# Patient Record
Sex: FEMALE | Race: BLACK OR AFRICAN AMERICAN | NOT HISPANIC OR LATINO | Employment: FULL TIME | URBAN - METROPOLITAN AREA
[De-identification: names, ages, dates, MRNs, and addresses within clinical notes are randomized per-mention and may not be internally consistent; named-entity substitution may affect disease eponyms.]

---

## 2017-02-14 ENCOUNTER — NON-PROVIDER VISIT (OUTPATIENT)
Dept: URGENT CARE | Facility: CLINIC | Age: 37
End: 2017-02-14

## 2017-02-14 DIAGNOSIS — Z11.1 PPD SCREENING TEST: ICD-10-CM

## 2017-02-14 PROCEDURE — 86580 TB INTRADERMAL TEST: CPT | Performed by: NURSE PRACTITIONER

## 2017-02-16 ENCOUNTER — NON-PROVIDER VISIT (OUTPATIENT)
Dept: URGENT CARE | Facility: CLINIC | Age: 37
End: 2017-02-16

## 2017-02-16 LAB — TB WHEAL 3D P 5 TU DIAM: NORMAL MM

## 2017-02-16 NOTE — PROGRESS NOTES
Jennie Morales is a 36 y.o. female here for a non-provider visit for PPD reading -- Step 1 of 1.      Resulted in Epic under original non-provider visit? Yes   TB evaluation questionnaire scanned into chart and original given to pt?Yes      Routed to PCP? No

## 2017-03-20 ENCOUNTER — HOSPITAL ENCOUNTER (OUTPATIENT)
Dept: LAB | Facility: MEDICAL CENTER | Age: 37
End: 2017-03-20
Attending: INTERNAL MEDICINE
Payer: COMMERCIAL

## 2017-03-20 ENCOUNTER — OFFICE VISIT (OUTPATIENT)
Dept: MEDICAL GROUP | Facility: MEDICAL CENTER | Age: 37
End: 2017-03-20
Payer: COMMERCIAL

## 2017-03-20 VITALS
WEIGHT: 161.82 LBS | TEMPERATURE: 97.3 F | BODY MASS INDEX: 23.97 KG/M2 | SYSTOLIC BLOOD PRESSURE: 112 MMHG | HEIGHT: 69 IN | HEART RATE: 81 BPM | DIASTOLIC BLOOD PRESSURE: 62 MMHG | OXYGEN SATURATION: 100 %

## 2017-03-20 DIAGNOSIS — Z00.00 HEALTH CARE MAINTENANCE: ICD-10-CM

## 2017-03-20 DIAGNOSIS — R32 URINARY INCONTINENCE, UNSPECIFIED TYPE: ICD-10-CM

## 2017-03-20 DIAGNOSIS — N92.0 MENORRHAGIA WITH REGULAR CYCLE: ICD-10-CM

## 2017-03-20 DIAGNOSIS — Z76.89 ENCOUNTER TO ESTABLISH CARE: ICD-10-CM

## 2017-03-20 LAB
25(OH)D3 SERPL-MCNC: 38 NG/ML (ref 30–100)
ALBUMIN SERPL BCP-MCNC: 3.2 G/DL (ref 3.2–4.9)
ALBUMIN/GLOB SERPL: 0.7 G/DL
ALP SERPL-CCNC: 42 U/L (ref 30–99)
ALT SERPL-CCNC: 11 U/L (ref 2–50)
ANION GAP SERPL CALC-SCNC: 7 MMOL/L (ref 0–11.9)
ANISOCYTOSIS BLD QL SMEAR: ABNORMAL
APPEARANCE UR: CLEAR
AST SERPL-CCNC: 26 U/L (ref 12–45)
BACTERIA #/AREA URNS HPF: ABNORMAL /HPF
BASOPHILS # BLD AUTO: 0.3 % (ref 0–1.8)
BASOPHILS # BLD: 0.01 K/UL (ref 0–0.12)
BILIRUB SERPL-MCNC: 0.6 MG/DL (ref 0.1–1.5)
BILIRUB UR QL STRIP.AUTO: NEGATIVE
BUN SERPL-MCNC: 10 MG/DL (ref 8–22)
CALCIUM SERPL-MCNC: 8.8 MG/DL (ref 8.4–10.2)
CHLORIDE SERPL-SCNC: 107 MMOL/L (ref 96–112)
CHOLEST SERPL-MCNC: 232 MG/DL (ref 100–199)
CO2 SERPL-SCNC: 23 MMOL/L (ref 20–33)
COLOR UR: YELLOW
COMMENT 1642: NORMAL
CREAT SERPL-MCNC: 0.82 MG/DL (ref 0.5–1.4)
CULTURE IF INDICATED INDCX: NO UA CULTURE
EOSINOPHIL # BLD AUTO: 0.02 K/UL (ref 0–0.51)
EOSINOPHIL NFR BLD: 0.6 % (ref 0–6.9)
EPI CELLS #/AREA URNS HPF: ABNORMAL /HPF
ERYTHROCYTE [DISTWIDTH] IN BLOOD BY AUTOMATED COUNT: 52 FL (ref 35.9–50)
GFR SERPL CREATININE-BSD FRML MDRD: >60 ML/MIN/1.73 M 2
GLOBULIN SER CALC-MCNC: 4.3 G/DL (ref 1.9–3.5)
GLUCOSE SERPL-MCNC: 93 MG/DL (ref 65–99)
GLUCOSE UR STRIP.AUTO-MCNC: NEGATIVE MG/DL
HCT VFR BLD AUTO: 24.3 % (ref 37–47)
HDLC SERPL-MCNC: 78 MG/DL
HGB BLD-MCNC: 6.7 G/DL (ref 12–16)
HYPOCHROMIA BLD QL SMEAR: ABNORMAL
IMM GRANULOCYTES # BLD AUTO: 0.01 K/UL (ref 0–0.11)
IMM GRANULOCYTES NFR BLD AUTO: 0.3 % (ref 0–0.9)
KETONES UR STRIP.AUTO-MCNC: NEGATIVE MG/DL
LDLC SERPL CALC-MCNC: 141 MG/DL
LEUKOCYTE ESTERASE UR QL STRIP.AUTO: NEGATIVE
LYMPHOCYTES # BLD AUTO: 1.4 K/UL (ref 1–4.8)
LYMPHOCYTES NFR BLD: 40.6 % (ref 22–41)
MACROCYTES BLD QL SMEAR: ABNORMAL
MCH RBC QN AUTO: 18.2 PG (ref 27–33)
MCHC RBC AUTO-ENTMCNC: 27.6 G/DL (ref 33.6–35)
MCV RBC AUTO: 66 FL (ref 81.4–97.8)
MICRO URNS: ABNORMAL
MICROCYTES BLD QL SMEAR: ABNORMAL
MONOCYTES # BLD AUTO: 0.18 K/UL (ref 0–0.85)
MONOCYTES NFR BLD AUTO: 5.2 % (ref 0–13.4)
MUCOUS THREADS #/AREA URNS HPF: ABNORMAL /HPF
NEUTROPHILS # BLD AUTO: 1.83 K/UL (ref 2–7.15)
NEUTROPHILS NFR BLD: 53 % (ref 44–72)
NITRITE UR QL STRIP.AUTO: NEGATIVE
NRBC # BLD AUTO: 0 K/UL
NRBC BLD AUTO-RTO: 0 /100 WBC
PH UR STRIP.AUTO: 5.5 [PH]
PLATELET # BLD AUTO: 373 K/UL (ref 164–446)
PLATELET BLD QL SMEAR: NORMAL
PMV BLD AUTO: 8.6 FL (ref 9–12.9)
POLYCHROMASIA BLD QL SMEAR: NORMAL
POTASSIUM SERPL-SCNC: 3.7 MMOL/L (ref 3.6–5.5)
PROT SERPL-MCNC: 7.5 G/DL (ref 6–8.2)
PROT UR QL STRIP: NEGATIVE MG/DL
RBC # BLD AUTO: 3.68 M/UL (ref 4.2–5.4)
RBC # URNS HPF: ABNORMAL /HPF
RBC BLD AUTO: PRESENT
RBC UR QL AUTO: ABNORMAL
SODIUM SERPL-SCNC: 137 MMOL/L (ref 135–145)
SP GR UR STRIP.AUTO: 1.02
TRIGL SERPL-MCNC: 66 MG/DL (ref 0–149)
TSH SERPL DL<=0.005 MIU/L-ACNC: 1.05 UIU/ML (ref 0.35–5.5)
WBC # BLD AUTO: 3.5 K/UL (ref 4.8–10.8)

## 2017-03-20 PROCEDURE — 81001 URINALYSIS AUTO W/SCOPE: CPT

## 2017-03-20 PROCEDURE — 36415 COLL VENOUS BLD VENIPUNCTURE: CPT

## 2017-03-20 PROCEDURE — 99204 OFFICE O/P NEW MOD 45 MIN: CPT | Performed by: INTERNAL MEDICINE

## 2017-03-20 PROCEDURE — 84443 ASSAY THYROID STIM HORMONE: CPT

## 2017-03-20 PROCEDURE — 82306 VITAMIN D 25 HYDROXY: CPT

## 2017-03-20 PROCEDURE — 80053 COMPREHEN METABOLIC PANEL: CPT

## 2017-03-20 PROCEDURE — 85025 COMPLETE CBC W/AUTO DIFF WBC: CPT

## 2017-03-20 PROCEDURE — 80061 LIPID PANEL: CPT

## 2017-03-20 ASSESSMENT — PATIENT HEALTH QUESTIONNAIRE - PHQ9: CLINICAL INTERPRETATION OF PHQ2 SCORE: 0

## 2017-03-20 NOTE — PROGRESS NOTES
CHIEF COMPLAINT  Chief Complaint   Patient presents with   • Establish Care   Urinary incontinence    HPI  Patient is a 36 y.o. female patient who presents today for the following     Urinary incontinence  Onset: 1 year ago  C/o intermittent urinary leakage, present occasionally overnight.   It is not worse with stress or urge incontinence.   Course: up/down  She had fibroid surgery 4 yrs ago.   Denies: fever, chills, change in urine color/odor, flank/abdominal pain.   She does not know about Kegel exercise.     Irregular periods  C/o intermittent periods lasting up to 10 days.   Frequency: every month.   Denies hot flushes, sweating, vaginal dryness, mood swings.   St post fibroids surgery.     Reviewed PMH, PSH, FH, SH, ALL, HCM/IMM  Reviewed MEDS    Patient Active Problem List    Diagnosis Date Noted   • Urinary incontinence 03/20/2017     CURRENT MEDICATIONS  No current outpatient prescriptions on file.     No current facility-administered medications for this visit.     ALLERGIES  Allergies: Review of patient's allergies indicates not on file.  PAST MEDICAL HISTORY  Past Medical History   Diagnosis Date   • Urinary incontinence      SURGICAL HISTORY  She  has no past surgical history on file.  SOCIAL HISTORY  Social History   Substance Use Topics   • Smoking status: Never Smoker    • Smokeless tobacco: Never Used   • Alcohol Use: 0.0 oz/week     0 Standard drinks or equivalent per week      Comment: occ     Social History     Social History Narrative   • No narrative on file     FAMILY HISTORY  Family History   Problem Relation Age of Onset   • Cancer Sister      cervical cance   • Cancer Maternal Grandmother    • Diabetes Father    • Heart Disease Neg Hx    • Hypertension Neg Hx    • Hyperlipidemia Mother    • Hyperlipidemia Father    • Hyperlipidemia Sister    • Psychiatry Mother    • Psychiatry Sister    • Thyroid Neg Hx      No family status information on file.       ROS   Constitutional: Negative for  "fever, chills.  HENT: Negative for congestion, sore throat.  Eyes: Negative for blurred vision.   Respiratory: Negative for cough, shortness of breath.  Cardiovascular: Negative for chest pain, palpitations.   Gastrointestinal: Negative for heartburn, nausea, abdominal pain.   Genitourinary: Negative for dysuria.  Musculoskeletal: Negative for significant myalgias, back pain and joint pain.   Skin: Negative for rash and itching.   Neuro: Negative for dizziness, weakness and headaches.   Endo/Heme/Allergies: Does not bruise/bleed easily.   Psychiatric/Behavioral: Negative for depression, anxiety    PHYSICAL EXAM   Blood pressure 112/62, pulse 81, temperature 36.3 °C (97.3 °F), height 1.753 m (5' 9\"), weight 73.4 kg (161 lb 13.1 oz), SpO2 100 %. Body mass index is 23.89 kg/(m^2).  General:  NAD, well appearing  HEENT:   NC/AT, PERRLA, EOMI, TMs are clear. Oropharyngeal mucosa is pink,  without lesions;  no cervical / supraclavicular  lymphadenopathy, no thyromegaly.    Cardiovascular: RRR.   No m/r/g. No carotid bruits .      Lungs:   CTAB, no w/r/r, no respiratory distress.  Abdomen: Soft, NT/ND + BS; no suprapubic tenderness; no masses or hepatosplenomegaly.  Extremities:  2+ DP and radial pulses bilaterally.  No c/c/e.   Skin:  Warm, dry.  No erythema. No rash.   Neurologic: Alert & oriented x 3.  No focal deficits.  Psychiatric:  Affect normal, mood normal, judgment normal.    LABS     Pending.    IMAGING     None    ASSESMENT AND PLAN        1. Urinary incontinence, unspecified type  Pending labs.  - REFERRAL TO PHYSICAL THERAPY Reason for Therapy: Eval/Treat/Report  - REFERRAL TO UROLOGY    2. Irregular periods  Pending labs.   Will be followed.     3. Health care maintenance  - CBC WITH DIFFERENTIAL; Future  - COMP METABOLIC PANEL; Future  - LIPID PROFILE; Future  - TSH; Future  - VITAMIN D,25 HYDROXY; Future    Declined flu vaccine.     4. Encounter to establish care  Reviewed PMH, PSH, FH, SH, ALL, MEDS, " HCM/IMM.   Advised healthy habits, diet, exercise.  Release form for old records: signed    Counseling:   - Smoking:  Nonsmoker     Followup: Return in about 2 months (around 5/20/2017).    All questions are answered.    Please note that this dictation was created using voice recognition software, and that there might be errors of rai and possibly content.

## 2017-03-20 NOTE — MR AVS SNAPSHOT
"        Jennie Morales   3/20/2017 7:20 AM   Office Visit   MRN: [id number]    Department:  [place] [address]   Dept Phone:  [phone]    Description:  Female : 1980   Provider:  Savannah Rothman M.D.           Reason for Visit     Establish Care           Allergies as of 3/20/2017     Not on File      You were diagnosed with     Urinary incontinence, unspecified type   [1901338]       Menorrhagia with regular cycle   [112909]       Health care maintenance   [977335]       Encounter to establish care   [194331]         Vital Signs     Blood Pressure Pulse Temperature Height Weight Body Mass Index    112/62 mmHg 81 36.3 °C (97.3 °F) 1.753 m (5' 9\") 73.4 kg (161 lb 13.1 oz) 23.89 kg/m2    Oxygen Saturation Smoking Status                100% Never Smoker           Basic Information     Date Of Birth Sex Race Ethnicity Preferred Language    1980 Female Black or  Unknown English      Your appointments     2017  7:00 AM   ANNUAL EXAM PREVENTATIVE with Savannah Rothman M.D.   [facility])    [address]  [address]  [place] [id number] [phone]              Problem List              ICD-10-CM Priority Class Noted - Resolved    Urinary incontinence R32   3/20/2017 - Present    Health care maintenance Z00.00   3/20/2017 - Present      Health Maintenance        Date Due Completion Dates    IMM DTaP/Tdap/Td Vaccine (1 - Tdap) 1998 ---    PAP SMEAR 2000 ---    IMM INFLUENZA (1) 2016 ---            Current Immunizations     Tuberculin Skin Test 2017 12:08 PM, 3/18/2015 12:40 PM      Below and/or attached are the medications your provider expects you to take. Review all of your home medications and newly ordered medications with your provider and/or pharmacist. Follow medication instructions as directed by your provider and/or pharmacist. Please keep your medication list with you and share with your " provider. Update the information when medications are discontinued, doses are changed, or new medications (including over-the-counter products) are added; and carry medication information at all times in the event of emergency situations     Allergies:  No Known Allergies          Medications  Valid as of: March 20, 2017 -  7:44 AM    Generic Name Brand Name Tablet Size Instructions for use    .                 Medicines prescribed today were sent to:     [facility] - [place], [place] - [address]    [address] [place] [address]    Phone: [phone] Fax: [phone]    Open 24 Hours?: No      Medication refill instructions:       If your prescription bottle indicates you have medication refills left, it is not necessary to call your provider’s office. Please contact your pharmacy and they will refill your medication.    If your prescription bottle indicates you do not have any refills left, you may request refills at any time through one of the following ways: The online [employer] system (except Urgent Care), by calling your provider’s office, or by asking your pharmacy to contact your provider’s office with a refill request. Medication refills are processed only during regular business hours and may not be available until the next business day. Your provider may request additional information or to have a follow-up visit with you prior to refilling your medication.   *Please Note: Medication refills are assigned a new Rx number when refilled electronically. Your pharmacy may indicate that no refills were authorized even though a new prescription for the same medication is available at the pharmacy. Please request the medicine by name with the pharmacy before contacting your provider for a refill.        Your To Do List     Future Labs/Procedures Complete By Expires    CBC WITH DIFFERENTIAL  As directed 3/21/2018    COMP METABOLIC PANEL  As directed 3/21/2018    LIPID PROFILE  As directed  3/21/2018    TSH  As directed 3/21/2018    VITAMIN D,25 HYDROXY  As directed 3/21/2018      Referral     A referral request has been sent to our patient care coordination department. Please allow 3-5 business days for us to process this request and contact you either by phone or mail. If you do not hear from us by the 5th business day, please call us at [phone].           Social Rewards Access Code: [email or web address]  Expires: 4/19/2017  7:10 AM    Social Rewards  A secure, online tool to manage your health information     [employer]’s Social Rewards® is a secure, online tool that connects you to your personalized health information from the privacy of your home -- day or night - making it very easy for you to manage your healthcare. Once the activation process is completed, you can even access your medical information using the Social Rewards nadja, which is available for free in the Apple Nadja store or Google Play store.     Social Rewards provides the following levels of access (as shown below):   My Chart Features   [facility] Doctor [facility]  Specialists [facility]  Urgent  Care [facility]  Primary Care  Doctor   Email your healthcare team securely and privately 24/7 X X X    Manage appointments: schedule your next appointment; view details of past/upcoming appointments X      Request prescription refills. X      View recent personal medical records, including lab and immunizations X X X X   View health record, including health history, allergies, medications X X X X   Read reports about your outpatient visits, procedures, consult and ER notes X X X X   See your discharge summary, which is a recap of your hospital and/or ER visit that includes your diagnosis, lab results, and care plan. X X       How to register for Social Rewards:  1. Go to  [email or web address].  2. Click on the Sign Up Now box, which takes you to the New Member Sign Up page. You will need to provide the following information:  a. Enter your Social Rewards Access Code  exactly as it appears at the top of this page. (You will not need to use this code after you’ve completed the sign-up process. If you do not sign up before the expiration date, you must request a new code.)   b. Enter your date of birth.   c. Enter your home email address.   d. Click Submit, and follow the next screen’s instructions.  3. Create a [employer] ID. This will be your [employer] login ID and cannot be changed, so think of one that is secure and easy to remember.  4. Create a [employer] password. You can change your password at any time.  5. Enter your Password Reset Question and Answer. This can be used at a later time if you forget your password.   6. Enter your e-mail address. This allows you to receive e-mail notifications when new information is available in [employer].  7. Click Sign Up. You can now view your health information.    For assistance activating your [employer] account, call [phone]

## 2017-03-21 ENCOUNTER — TELEPHONE (OUTPATIENT)
Dept: MEDICAL GROUP | Facility: MEDICAL CENTER | Age: 37
End: 2017-03-21

## 2017-03-21 ENCOUNTER — APPOINTMENT (OUTPATIENT)
Dept: RADIOLOGY | Facility: MEDICAL CENTER | Age: 37
End: 2017-03-21
Attending: EMERGENCY MEDICINE
Payer: COMMERCIAL

## 2017-03-21 ENCOUNTER — HOSPITAL ENCOUNTER (EMERGENCY)
Facility: MEDICAL CENTER | Age: 37
End: 2017-03-21
Attending: EMERGENCY MEDICINE
Payer: COMMERCIAL

## 2017-03-21 VITALS
OXYGEN SATURATION: 98 % | SYSTOLIC BLOOD PRESSURE: 106 MMHG | WEIGHT: 163.8 LBS | HEART RATE: 76 BPM | BODY MASS INDEX: 24.26 KG/M2 | RESPIRATION RATE: 16 BRPM | DIASTOLIC BLOOD PRESSURE: 66 MMHG | TEMPERATURE: 98.3 F | HEIGHT: 69 IN

## 2017-03-21 DIAGNOSIS — D50.9 MICROCYTIC ANEMIA: ICD-10-CM

## 2017-03-21 DIAGNOSIS — N93.9 VAGINAL SPOTTING: ICD-10-CM

## 2017-03-21 LAB
ABO GROUP BLD: NORMAL
ABO GROUP BLD: NORMAL
ANION GAP SERPL CALC-SCNC: 7 MMOL/L (ref 0–11.9)
APTT PPP: 26 SEC (ref 24.7–36)
BARCODED ABORH UBTYP: 9500
BARCODED PRD CODE UBPRD: NORMAL
BARCODED UNIT NUM UBUNT: NORMAL
BASOPHILS # BLD AUTO: 0.4 % (ref 0–1.8)
BASOPHILS # BLD: 0.02 K/UL (ref 0–0.12)
BLD GP AB SCN SERPL QL: NORMAL
BUN SERPL-MCNC: 11 MG/DL (ref 8–22)
CALCIUM SERPL-MCNC: 8.5 MG/DL (ref 8.4–10.2)
CHLORIDE SERPL-SCNC: 107 MMOL/L (ref 96–112)
CO2 SERPL-SCNC: 22 MMOL/L (ref 20–33)
COMPONENT R 8504R: NORMAL
CREAT SERPL-MCNC: 0.75 MG/DL (ref 0.5–1.4)
EOSINOPHIL # BLD AUTO: 0.01 K/UL (ref 0–0.51)
EOSINOPHIL NFR BLD: 0.2 % (ref 0–6.9)
ERYTHROCYTE [DISTWIDTH] IN BLOOD BY AUTOMATED COUNT: 50.6 FL (ref 35.9–50)
FERRITIN SERPL-MCNC: 4.5 NG/ML (ref 10–291)
GFR SERPL CREATININE-BSD FRML MDRD: >60 ML/MIN/1.73 M 2
GLUCOSE SERPL-MCNC: 98 MG/DL (ref 65–99)
HCG SERPL QL: NEGATIVE
HCT VFR BLD AUTO: 22.8 % (ref 37–47)
HGB BLD-MCNC: 6.1 G/DL (ref 12–16)
IMM GRANULOCYTES # BLD AUTO: 0.01 K/UL (ref 0–0.11)
IMM GRANULOCYTES NFR BLD AUTO: 0.2 % (ref 0–0.9)
INR PPP: 0.89 (ref 0.87–1.13)
IRON SATN MFR SERPL: ABNORMAL % (ref 15–55)
IRON SERPL-MCNC: <10 UG/DL (ref 40–170)
LYMPHOCYTES # BLD AUTO: 2.04 K/UL (ref 1–4.8)
LYMPHOCYTES NFR BLD: 42.8 % (ref 22–41)
MCH RBC QN AUTO: 17.8 PG (ref 27–33)
MCHC RBC AUTO-ENTMCNC: 26.8 G/DL (ref 33.6–35)
MCV RBC AUTO: 66.5 FL (ref 81.4–97.8)
MONOCYTES # BLD AUTO: 0.33 K/UL (ref 0–0.85)
MONOCYTES NFR BLD AUTO: 6.9 % (ref 0–13.4)
NEUTROPHILS # BLD AUTO: 2.36 K/UL (ref 2–7.15)
NEUTROPHILS NFR BLD: 49.5 % (ref 44–72)
NRBC # BLD AUTO: 0 K/UL
NRBC BLD AUTO-RTO: 0 /100 WBC
PLATELET # BLD AUTO: 356 K/UL (ref 164–446)
PMV BLD AUTO: 9 FL (ref 9–12.9)
POTASSIUM SERPL-SCNC: 3.7 MMOL/L (ref 3.6–5.5)
PRODUCT TYPE UPROD: NORMAL
PROTHROMBIN TIME: 11.9 SEC (ref 12–14.6)
RBC # BLD AUTO: 3.43 M/UL (ref 4.2–5.4)
RH BLD: NORMAL
SODIUM SERPL-SCNC: 136 MMOL/L (ref 135–145)
TIBC SERPL-MCNC: 568 UG/DL (ref 250–450)
UNIT STATUS USTAT: NORMAL
WBC # BLD AUTO: 4.8 K/UL (ref 4.8–10.8)

## 2017-03-21 PROCEDURE — 80048 BASIC METABOLIC PNL TOTAL CA: CPT

## 2017-03-21 PROCEDURE — 86850 RBC ANTIBODY SCREEN: CPT

## 2017-03-21 PROCEDURE — 84703 CHORIONIC GONADOTROPIN ASSAY: CPT

## 2017-03-21 PROCEDURE — 83550 IRON BINDING TEST: CPT

## 2017-03-21 PROCEDURE — 36430 TRANSFUSION BLD/BLD COMPNT: CPT

## 2017-03-21 PROCEDURE — 85025 COMPLETE CBC W/AUTO DIFF WBC: CPT

## 2017-03-21 PROCEDURE — 85730 THROMBOPLASTIN TIME PARTIAL: CPT

## 2017-03-21 PROCEDURE — 36415 COLL VENOUS BLD VENIPUNCTURE: CPT

## 2017-03-21 PROCEDURE — P9016 RBC LEUKOCYTES REDUCED: HCPCS

## 2017-03-21 PROCEDURE — 83540 ASSAY OF IRON: CPT

## 2017-03-21 PROCEDURE — 76830 TRANSVAGINAL US NON-OB: CPT

## 2017-03-21 PROCEDURE — 86900 BLOOD TYPING SEROLOGIC ABO: CPT

## 2017-03-21 PROCEDURE — 99285 EMERGENCY DEPT VISIT HI MDM: CPT

## 2017-03-21 PROCEDURE — 86901 BLOOD TYPING SEROLOGIC RH(D): CPT

## 2017-03-21 PROCEDURE — 86923 COMPATIBILITY TEST ELECTRIC: CPT

## 2017-03-21 PROCEDURE — 85610 PROTHROMBIN TIME: CPT

## 2017-03-21 PROCEDURE — 82728 ASSAY OF FERRITIN: CPT

## 2017-03-21 RX ORDER — LANOLIN ALCOHOL/MO/W.PET/CERES
325 CREAM (GRAM) TOPICAL EVERY 12 HOURS
Qty: 30 TAB | Refills: 0 | Status: SHIPPED | OUTPATIENT
Start: 2017-03-21 | End: 2017-03-27 | Stop reason: SDUPTHER

## 2017-03-21 RX ORDER — MEDROXYPROGESTERONE ACETATE 10 MG/1
10 TABLET ORAL DAILY
Qty: 14 TAB | Refills: 0 | Status: SHIPPED | OUTPATIENT
Start: 2017-03-21 | End: 2017-03-21

## 2017-03-21 ASSESSMENT — PAIN SCALES - GENERAL: PAINLEVEL_OUTOF10: 0

## 2017-03-21 NOTE — ED AVS SNAPSHOT
Home Care Instructions                                                                                                                Jennie Morales   MRN: [id number]    Department:  [facility], Emergency Dept   Date of Visit:  3/21/2017            [facility], Emergency Dept    [address]    [place] [address]    Phone:  [phone]      You were seen by     Neil Khan M.D.      Your Diagnosis Was     Microcytic anemia     D50.9       Follow-up Information     1. Follow up with Savannah Rothman M.D. In 2 days.    Specialty:  Family Medicine    Contact information    [address]  [address]  [facility] [address] [phone]          2. Follow up with [facility], Emergency Dept.    Specialty:  Emergency Medicine    Why:  As needed, If symptoms worsen    Contact information    [address]  [place] [address] [phone]        3. Follow up with Bev Johnson M.D.. Call in 1 day.    Specialty:  OB/Gyn    Contact information    [address]  [address]  [facility] [address] [phone]        Medication Information     Review all of your home medications and newly ordered medications with your primary doctor and/or pharmacist as soon as possible. Follow medication instructions as directed by your doctor and/or pharmacist.     Please keep your complete medication list with you and share with your physician. Update the information when medications are discontinued, doses are changed, or new medications (including over-the-counter products) are added; and carry medication information at all times in the event of emergency situations.               Medication List      START taking these medications        Instructions    Morning Afternoon Evening Bedtime    ferrous sulfate 325 (65 FE) MG EC tablet        Take 1 Tab by mouth every 12 hours.   Dose:  325 mg                             Where to Get Your Medications      These medications were sent to [facility] - [place], [place] - [address]  [address] [facility] [place] [address]     Phone:  [phone]    - ferrous sulfate 325 (65 FE) MG EC tablet            Procedures and tests performed during your visit     ABO CONFIRMATION    APTT    BASIC METABOLIC PANEL    CBC WITH DIFFERENTIAL    COD (ADULT)    ESTIMATED GFR    FEIBC    FERRITIN    HCG QUAL SERUM    IV Saline Lock    PROTHROMBIN TIME (INR)    RELEASE RED BLOOD CELLS    US-GYN-PELVIS TRANSVAGINAL        Discharge Instructions       Blood Transfusion   A blood transfusion is a procedure in which you receive donated blood through an IV tube. You may need a blood transfusion because of illness, surgery, or injury. The blood may come from a donor, or it may be your own blood that you donated previously.  The blood given in a transfusion is made up of different types of cells. You may receive:  · Red blood cells. These carry oxygen and replace lost blood.  · Platelets. These control bleeding.  · Plasma. This helps blood to clot.  If you have hemophilia or another clotting disorder, you may also receive other types of blood products.  LET YOUR HEALTH CARE PROVIDER KNOW ABOUT:  · Any allergies you have.  · All medicines you are taking, including vitamins, herbs, eye drops, creams, and over-the-counter medicines.  · Previous problems you or members of your family have had with the use of anesthetics.  · Any blood disorders you have.  · Previous surgeries you have had.  · Any medical conditions you may have.  · Any previous reactions you have had during a blood transfusion.    RISKS AND COMPLICATIONS  Generally, this is a safe procedure. However, problems may occur, including:  · Having an allergic reaction to something in the donated blood.  · Fever. This may be a reaction to the white blood cells in the transfused blood.  · Iron overload. This can happen from having many transfusions.  · Transfusion-related  acute lung injury (TRALI). This is a rare reaction that causes lung damage. The cause is not known. TRALI can occur within hours of a transfusion or several days later.  · Sudden (acute) or delayed hemolytic reactions. This happens if your blood does not match the cells in your transfusion. Your body's defense system (immune system) may try to attack the new cells. This complication is rare.  · Infection. This is rare.  BEFORE THE PROCEDURE  · You may have a blood test to determine your blood type. This is necessary to know what kind of blood your body will accept.  · If you are going to have a planned surgery, you may donate your own blood. This may be done in case you need to have a transfusion.  · If you have had an allergic reaction to a transfusion in the past, you may be given medicine to help prevent a reaction. Take this medicine only as directed by your health care provider.  · You will have your temperature, blood pressure, and pulse monitored before the transfusion.  PROCEDURE   · An IV will be started in your hand or arm.  · The bag of donated blood will be attached to your IV tube and given into your vein.  · Your temperature, blood pressure, and pulse will be monitored regularly during the transfusion. This monitoring is done to detect early signs of a transfusion reaction.  · If you have any signs or symptoms of a reaction, your transfusion will be stopped and you may be given medicine.  · When the transfusion is over, your IV will be removed.  · Pressure may be applied to the IV site for a few minutes.  · A bandage (dressing) will be applied.  The procedure may vary among health care providers and hospitals.  AFTER THE PROCEDURE  · Your blood pressure, temperature, and pulse will be monitored regularly.     This information is not intended to replace advice given to you by your health care provider. Make sure you discuss any questions you have with your health care provider.     Document Released:  12/15/2001 Document Revised: 01/08/2016 Document Reviewed: 10/28/2015  [employer]aver Interactive Patient Education ©2016 [employer]      Iron Deficiency Anemia, Adult  Anemia is a condition in which there are less red blood cells or hemoglobin in the blood than normal. Hemoglobin is the part of red blood cells that carries oxygen. Iron deficiency anemia is anemia caused by too little iron. It is the most common type of anemia. It may leave you tired and short of breath.  CAUSES   · Lack of iron in the diet.  · Poor absorption of iron, as seen with intestinal disorders.  · Intestinal bleeding.  · Heavy periods.  SIGNS AND SYMPTOMS   Mild anemia may not be noticeable. Symptoms may include:  · Fatigue.  · Headache.  · Pale skin.  · Weakness.  · Tiredness.  · Shortness of breath.  · Dizziness.  · Cold hands and feet.  · Fast or irregular heartbeat.  DIAGNOSIS   Diagnosis requires a thorough evaluation and physical exam by your health care provider. Blood tests are generally used to confirm iron deficiency anemia. Additional tests may be done to find the underlying cause of your anemia. These may include:  · Testing for blood in the stool (fecal occult blood test).  · A procedure to see inside the colon and rectum (colonoscopy).  · A procedure to see inside the esophagus and stomach (endoscopy).  TREATMENT   Iron deficiency anemia is treated by correcting the cause of the deficiency. Treatment may involve:  · Adding iron-rich foods to your diet.  · Taking iron supplements. Pregnant or breastfeeding women need to take extra iron because their normal diet usually does not provide the required amount.  · Taking vitamins. Vitamin C improves the absorption of iron. Your health care provider may recommend that you take your iron tablets with a glass of orange juice or vitamin C supplement.  · Medicines to make heavy menstrual flow lighter.  · Surgery.  HOME CARE INSTRUCTIONS   · Take iron as directed by your health care  provider.  ¨ If you cannot tolerate taking iron supplements by mouth, talk to your health care provider about taking them through a vein (intravenously) or an injection into a muscle.  ¨ For the best iron absorption, iron supplements should be taken on an empty stomach. If you cannot tolerate them on an empty stomach, you may need to take them with food.  ¨ Do not drink milk or take antacids at the same time as your iron supplements. Milk and antacids may interfere with the absorption of iron.  ¨ Iron supplements can cause constipation. Make sure to include fiber in your diet to prevent constipation. A stool softener may also be recommended.  · Take vitamins as directed by your health care provider.  · Eat a diet rich in iron. Foods high in iron include liver, lean beef, whole-grain bread, eggs, dried fruit, and dark green leafy vegetables.  SEEK IMMEDIATE MEDICAL CARE IF:   · You faint. If this happens, do not drive. Call your local emergency services (911 in [place] if no other help is available.  · You have chest pain.  · You feel nauseous or vomit.  · You have severe or increased shortness of breath with activity.  · You feel weak.  · You have a rapid heartbeat.  · You have unexplained sweating.  · You become light-headed when getting up from a chair or bed.  MAKE SURE YOU:   · Understand these instructions.  · Will watch your condition.  · Will get help right away if you are not doing well or get worse.     This information is not intended to replace advice given to you by your health care provider. Make sure you discuss any questions you have with your health care provider.     Document Released: 12/15/2001 Document Revised: 01/08/2016 Document Reviewed: 08/25/2014  [employer]) Interactive Patient Education ©2016 [employer] ([employer]      Anemia, Frequently Asked Questions  WHAT ARE THE SYMPTOMS OF ANEMIA?  · Headache.   · Difficulty thinking.   · Fatigue.   · Shortness of breath.   · Weakness.   · Rapid heartbeat.   AT  WHAT POINT ARE PEOPLE CONSIDERED ANEMIC?   This varies with gender and age.   · Both hemoglobin (Hgb) and hematocrit values are used to define anemia. These lab values are obtained from a complete blood count (CBC) test. This is performed at a caregiver's office.   · The normal range of hemoglobin values for adult men is 14.0 g/dL to 17.4 g/dL. For nonpregnant women, values are 12.3 g/dL to 15.3 g/dL.   · The World Health Organization defines anemia as less than 12 g/dL for nonpregnant women and less than 13 g/dL for men.   · For adult males, the average normal hematocrit is 46%, and the range is 40% to 52%.   · For adult females, the average normal hematocrit is 41%, and the range is 35% to 47%.   · Values that fall below the lower limits can be a sign of anemia and should have further checking (evaluation).   GROUPS OF PEOPLE WHO ARE AT RISK FOR DEVELOPING ANEMIA INCLUDE:   · Infants who are  or taking a formula that is not fortified with iron.   · Children going through a rapid growth spurt. The iron available can not keep up with the needs for a red cell mass which must grow with the child.   · Women in childbearing years. They need iron because of blood loss during menstruation.   · Pregnant women. The growing fetus creates a high demand for iron.   · People with ongoing gastrointestinal blood loss are at risk of developing iron deficiency.   · Individuals with leukemia or cancer who must receive chemotherapy or radiation to treat their disease. The drugs or radiation used to treat these diseases often decreases the bone marrow's ability to make cells of all classes. This includes red blood cells, white blood cells, and platelets.   · Individuals with chronic inflammatory conditions such as rheumatoid arthritis or chronic infections.   · The elderly.   ARE SOME TYPES OF ANEMIA INHERITED?   · Yes, some types of anemia are due to inherited or genetic defects.   · Sickle cell anemia. This occurs most  often in people of , , and Mediterranean descent.   · Thalassemia (or Muhammad's anemia). This type is found in people of Mediterranean and Southeast  descent. These types of anemia are common.   · Fanconi. This is rare.   CAN CERTAIN MEDICATIONS CAUSE A PERSON TO BECOME ANEMIC?   Yes. For example, drugs to fight cancer (chemotherapeutic agents) often cause anemia. These drugs can slow the bone marrow's ability to make red blood cells. If there are not enough red blood cells, the body does not get enough oxygen.  WHAT HEMATOCRIT LEVEL IS REQUIRED TO DONATE BLOOD?   The lower limit of an acceptable hematocrit for blood donors is 38%. If you have a low hematocrit value, you should schedule an appointment with your caregiver.  ARE BLOOD TRANSFUSIONS COMMONLY USED TO CORRECT ANEMIA, AND ARE THEY DANGEROUS?   They are used to treat anemia as a last resort. Your caregiver will find the cause of the anemia and correct it if possible. Most blood transfusions are given because of excessive bleeding at the time of surgery, with trauma, or because of bone marrow suppression in patients with cancer or leukemia on chemotherapy. Blood transfusions are safer than ever before. We also know that blood transfusions affect the immune system and may increase certain risks. There is also a concern for human error. In 1/16,000 transfusions, a patient receives a transfusion of blood that is not matched with his or her blood type.   WHAT IS IRON DEFICIENCY ANEMIA AND CAN I CORRECT IT BY CHANGING MY DIET?   Iron is an essential part of hemoglobin. Without enough hemoglobin, anemia develops and the body does not get the right amount of oxygen. Iron deficiency anemia develops after the body has had a low level of iron for a long time. This is either caused by blood loss, not taking in or absorbing enough iron, or increased demands for iron (like pregnancy or rapid growth).   Foods from animal origin such as beef,  chicken, and pork, are good sources of iron. Be sure to have one of these foods at each meal. Vitamin C helps your body absorb iron. Foods rich in Vitamin C include citrus, bell pepper, strawberries, spinach and cantaloupe. In some cases, iron supplements may be needed in order to correct the iron deficiency. In the case of poor absorption, extra iron may have to be given directly into the vein through a needle (intravenously).  I HAVE BEEN DIAGNOSED WITH IRON DEFICIENCY ANEMIA AND MY CAREGIVER PRESCRIBED IRON SUPPLEMENTS. HOW LONG WILL IT TAKE FOR MY BLOOD TO BECOME NORMAL?   It depends on the degree of anemia at the beginning of treatment. Most people with mild to moderate iron deficiency, anemia will correct the anemia over a period of 2 to 3 months. But after the anemia is corrected, the iron stored by the body is still low. Caregivers often suggest an additional 6 months of oral iron therapy once the anemia has been reversed. This will help prevent the iron deficiency anemia from quickly happening again. Non-anemic adult males should take iron supplements only under the direction of a doctor, too much iron can cause liver damage.   MY HEMOGLOBIN IS 9 G/DL AND I AM SCHEDULED FOR SURGERY. SHOULD I POSTPONE THE SURGERY?   If you have Hgb of 9, you should discuss this with your caregiver right away. Many patients with similar hemoglobin levels have had surgery without problems. If minimal blood loss is expected for a minor procedure, no treatment may be necessary.   If a greater blood loss is expected for more extensive procedures, you should ask your caregiver about being treated with erythropoietin and iron. This is to accelerate the recovery of your hemoglobin to a normal level before surgery. An anemic patient who undergoes high-blood-loss surgery has a greater risk of surgical complications and need for a blood transfusion, which also carries some risk.   I HAVE BEEN TOLD THAT HEAVY MENSTRUAL PERIODS CAUSE  ANEMIA. IS THERE ANYTHING I CAN DO TO PREVENT THE ANEMIA?   Anemia that results from heavy periods is usually due to iron deficiency. You can try to meet the increased demands for iron caused by the heavy monthly blood loss by increasing the intake of iron-rich foods. Iron supplements may be required. Discuss your concerns with your caregiver.  WHAT CAUSES ANEMIA DURING PREGNANCY?   Pregnancy places major demands on the body. The mother must meet the needs of both her body and her growing baby. The body needs enough iron and folate to make the right amount of red blood cells. To prevent anemia while pregnant, the mother should stay in close contact with her caregiver.   Be sure to eat a diet that has foods rich in iron and folate like liver and dark green leafy vegetables. Folate plays an important role in the normal development of a baby's spinal cord. Folate can help prevent serious disorders like spina bifida. If your diet does not provide adequate nutrients, you may want to talk with your caregiver about nutritional supplements.   WHAT IS THE RELATIONSHIP BETWEEN FIBROID TUMORS AND ANEMIA IN WOMEN?   The relationship is usually caused by the increased menstrual blood loss caused by fibroids. Good iron intake may be required to prevent iron deficiency anemia from developing.   Document Released: 07/26/2005 Document Revised: 03/11/2013 Document Reviewed: 01/10/2012  ExitCare® Patient Information ©2013 payever.          Patient Information     Patient Information    Following emergency treatment: all patient requiring follow-up care must return either to a private physician or a clinic if your condition worsens before you are able to obtain further medical attention, please return to the emergency room.     Billing Information    At [facility], we work to make the billing process streamlined for our patients.  Our Representatives are here to answer any questions you may have regarding your hospital bill.   If you have insurance coverage and have supplied your insurance information to us, we will submit a claim to your insurer on your behalf.  Should you have any questions regarding your bill, we can be reached online or by phone as follows:  Online: You are able pay your bills online or live chat with our representatives about any billing questions you may have. We are here to help Monday - Friday from 8:00am to 7:30pm and 9:00am - 12:00pm on Saturdays.  Please visit [email or web address]  for more information.   Phone:  [phone] or [phone]    Please note that your emergency physician, surgeon, pathologist, radiologist, anesthesiologist, and other specialists are not employed by [facility] and will therefore bill separately for their services.  Please contact them directly for any questions concerning their bills at the numbers below:     Emergency Physician Services:  [phone]  [facility]:  [phone]  [facility]:  [phone]  [facility]:  [phone]    1. Your final bill may vary from the amount quoted upon discharge if all procedures are not complete at that time, or if your doctor has additional procedures of which we are not aware. You will receive an additional bill if you return to the Emergency Department at [facility] for suture removal regardless of the facility of which the sutures were placed.     2. Please arrange for settlement of this account at the emergency registration.    3. All self-pay accounts are due in full at the time of treatment.  If you are unable to meet this obligation then payment is expected within 4-5 days.     4. If you have had radiology studies (CT, X-ray, Ultrasound, MRI), you have received a preliminary result during your emergency department visit. Please contact the radiology department [phone] to receive a copy of your final result. Please discuss the Final result with  your primary physician or with the follow up physician provided.     Crisis Hotline:  [place] Crisis Hotline:  4-251-BCSHBEQ or 1-533.259.2289  [place] Crisis Hotline:    1-376.856.2777 or 891-235-7544         ED Discharge Follow Up Questions    1. In order to provide you with very good care, we would like to follow up with a phone call in the next few days.  May we have your permission to contact you?     YES /  NO    2. What is the best phone number to call you? (       )_____-__________    3. What is the best time to call you?      Morning  /  Afternoon  /  Evening                   Patient Signature:  ____________________________________________________________    Date:  ____________________________________________________________      Your appointments     Jun 06, 2017  7:00 AM   ANNUAL EXAM PREVENTATIVE with Savannah Rothman M.D.   [facility])    [address]  [address]  [facility] [address]   [phone]

## 2017-03-21 NOTE — TELEPHONE ENCOUNTER
I called patient this morning due to very low Hgb, did nor leave voicemail.   My MA, HALEY Burk called pt twice and left voicemail to call back.  In PM, she was contacted by both of us, and she is heading towards the ER.   Dr Ricardo

## 2017-03-21 NOTE — ED AVS SNAPSHOT
Jelastic Access Code: [email or web address]  Expires: 4/19/2017  7:10 AM    Jelastic  A secure, online tool to manage your health information     [employer]’s Jelastic® is a secure, online tool that connects you to your personalized health information from the privacy of your home -- day or night - making it very easy for you to manage your healthcare. Once the activation process is completed, you can even access your medical information using the Jelastic nadja, which is available for free in the Apple Nadja store or Google Play store.     Jelastic provides the following levels of access (as shown below):   My Chart Features   [facility] Primary Care Doctor [facility]  Specialists [facility]  Urgent  Care Non-[facility]  Primary Care  Doctor   Email your healthcare team securely and privately 24/7 X X X X   Manage appointments: schedule your next appointment; view details of past/upcoming appointments X      Request prescription refills. X      View recent personal medical records, including lab and immunizations X X X X   View health record, including health history, allergies, medications X X X X   Read reports about your outpatient visits, procedures, consult and ER notes X X X X   See your discharge summary, which is a recap of your hospital and/or ER visit that includes your diagnosis, lab results, and care plan. X X       How to register for Jelastic:  1. Go to  [email or web address].  2. Click on the Sign Up Now box, which takes you to the New Member Sign Up page. You will need to provide the following information:  a. Enter your Jelastic Access Code exactly as it appears at the top of this page. (You will not need to use this code after you’ve completed the sign-up process. If you do not sign up before the expiration date, you must request a new code.)   b. Enter your date of birth.   c. Enter your home email address.   d. Click Submit, and follow the next screen’s instructions.  3. Create a Jelastic ID. This will be your Jelastic  login ID and cannot be changed, so think of one that is secure and easy to remember.  4. Create a Joinnus password. You can change your password at any time.  5. Enter your Password Reset Question and Answer. This can be used at a later time if you forget your password.   6. Enter your e-mail address. This allows you to receive e-mail notifications when new information is available in Joinnus.  7. Click Sign Up. You can now view your health information.    For assistance activating your Joinnus account, call [phone]

## 2017-03-21 NOTE — TELEPHONE ENCOUNTER
Please, notify patient that her hemoglobin is so low that she needs to go to the ER just morning.

## 2017-03-21 NOTE — ED AVS SNAPSHOT
3/21/2017          Jennie Morales  [address] [facility] [place] [address]    Dear Jennie:    [facility] wants to ensure your discharge home is safe and you or your loved ones have had all your questions answered regarding your care after you leave the hospital.    You may receive a telephone call within two days of your discharge.  This call is to make certain you understand your discharge instructions as well as ensure we provided you with the best care possible during your stay with us.     The call will only last approximately 3-5 minutes and will be done by a nurse.    Once again, we want to ensure your discharge home is safe and that you have a clear understanding of any next steps in your care.  If you have any questions or concerns, please do not hesitate to contact us, we are here for you.  Thank you for choosing [facility] for your healthcare needs.    Sincerely,    Mukund Cruz    [facility]

## 2017-03-22 DIAGNOSIS — D64.9 ANEMIA, UNSPECIFIED TYPE: ICD-10-CM

## 2017-03-22 NOTE — DISCHARGE INSTRUCTIONS
Blood Transfusion   A blood transfusion is a procedure in which you receive donated blood through an IV tube. You may need a blood transfusion because of illness, surgery, or injury. The blood may come from a donor, or it may be your own blood that you donated previously.  The blood given in a transfusion is made up of different types of cells. You may receive:  · Red blood cells. These carry oxygen and replace lost blood.  · Platelets. These control bleeding.  · Plasma. This helps blood to clot.  If you have hemophilia or another clotting disorder, you may also receive other types of blood products.  LET YOUR HEALTH CARE PROVIDER KNOW ABOUT:  · Any allergies you have.  · All medicines you are taking, including vitamins, herbs, eye drops, creams, and over-the-counter medicines.  · Previous problems you or members of your family have had with the use of anesthetics.  · Any blood disorders you have.  · Previous surgeries you have had.  · Any medical conditions you may have.  · Any previous reactions you have had during a blood transfusion.    RISKS AND COMPLICATIONS  Generally, this is a safe procedure. However, problems may occur, including:  · Having an allergic reaction to something in the donated blood.  · Fever. This may be a reaction to the white blood cells in the transfused blood.  · Iron overload. This can happen from having many transfusions.  · Transfusion-related acute lung injury (TRALI). This is a rare reaction that causes lung damage. The cause is not known. TRALI can occur within hours of a transfusion or several days later.  · Sudden (acute) or delayed hemolytic reactions. This happens if your blood does not match the cells in your transfusion. Your body's defense system (immune system) may try to attack the new cells. This complication is rare.  · Infection. This is rare.  BEFORE THE PROCEDURE  · You may have a blood test to determine your blood type. This is necessary to know what kind of blood your  body will accept.  · If you are going to have a planned surgery, you may donate your own blood. This may be done in case you need to have a transfusion.  · If you have had an allergic reaction to a transfusion in the past, you may be given medicine to help prevent a reaction. Take this medicine only as directed by your health care provider.  · You will have your temperature, blood pressure, and pulse monitored before the transfusion.  PROCEDURE   · An IV will be started in your hand or arm.  · The bag of donated blood will be attached to your IV tube and given into your vein.  · Your temperature, blood pressure, and pulse will be monitored regularly during the transfusion. This monitoring is done to detect early signs of a transfusion reaction.  · If you have any signs or symptoms of a reaction, your transfusion will be stopped and you may be given medicine.  · When the transfusion is over, your IV will be removed.  · Pressure may be applied to the IV site for a few minutes.  · A bandage (dressing) will be applied.  The procedure may vary among health care providers and hospitals.  AFTER THE PROCEDURE  · Your blood pressure, temperature, and pulse will be monitored regularly.     This information is not intended to replace advice given to you by your health care provider. Make sure you discuss any questions you have with your health care provider.     Document Released: 12/15/2001 Document Revised: 01/08/2016 Document Reviewed: 10/28/2015  [employer] Interactive Patient Education ©2016 [employer]      Iron Deficiency Anemia, Adult  Anemia is a condition in which there are less red blood cells or hemoglobin in the blood than normal. Hemoglobin is the part of red blood cells that carries oxygen. Iron deficiency anemia is anemia caused by too little iron. It is the most common type of anemia. It may leave you tired and short of breath.  CAUSES   · Lack of iron in the diet.  · Poor absorption of iron, as seen with  intestinal disorders.  · Intestinal bleeding.  · Heavy periods.  SIGNS AND SYMPTOMS   Mild anemia may not be noticeable. Symptoms may include:  · Fatigue.  · Headache.  · Pale skin.  · Weakness.  · Tiredness.  · Shortness of breath.  · Dizziness.  · Cold hands and feet.  · Fast or irregular heartbeat.  DIAGNOSIS   Diagnosis requires a thorough evaluation and physical exam by your health care provider. Blood tests are generally used to confirm iron deficiency anemia. Additional tests may be done to find the underlying cause of your anemia. These may include:  · Testing for blood in the stool (fecal occult blood test).  · A procedure to see inside the colon and rectum (colonoscopy).  · A procedure to see inside the esophagus and stomach (endoscopy).  TREATMENT   Iron deficiency anemia is treated by correcting the cause of the deficiency. Treatment may involve:  · Adding iron-rich foods to your diet.  · Taking iron supplements. Pregnant or breastfeeding women need to take extra iron because their normal diet usually does not provide the required amount.  · Taking vitamins. Vitamin C improves the absorption of iron. Your health care provider may recommend that you take your iron tablets with a glass of orange juice or vitamin C supplement.  · Medicines to make heavy menstrual flow lighter.  · Surgery.  HOME CARE INSTRUCTIONS   · Take iron as directed by your health care provider.  ¨ If you cannot tolerate taking iron supplements by mouth, talk to your health care provider about taking them through a vein (intravenously) or an injection into a muscle.  ¨ For the best iron absorption, iron supplements should be taken on an empty stomach. If you cannot tolerate them on an empty stomach, you may need to take them with food.  ¨ Do not drink milk or take antacids at the same time as your iron supplements. Milk and antacids may interfere with the absorption of iron.  ¨ Iron supplements can cause constipation. Make sure to  include fiber in your diet to prevent constipation. A stool softener may also be recommended.  · Take vitamins as directed by your health care provider.  · Eat a diet rich in iron. Foods high in iron include liver, lean beef, whole-grain bread, eggs, dried fruit, and dark green leafy vegetables.  SEEK IMMEDIATE MEDICAL CARE IF:   · You faint. If this happens, do not drive. Call your local emergency services (911 in [place] if no other help is available.  · You have chest pain.  · You feel nauseous or vomit.  · You have severe or increased shortness of breath with activity.  · You feel weak.  · You have a rapid heartbeat.  · You have unexplained sweating.  · You become light-headed when getting up from a chair or bed.  MAKE SURE YOU:   · Understand these instructions.  · Will watch your condition.  · Will get help right away if you are not doing well or get worse.     This information is not intended to replace advice given to you by your health care provider. Make sure you discuss any questions you have with your health care provider.     Document Released: 12/15/2001 Document Revised: 01/08/2016 Document Reviewed: 08/25/2014  [employer] Interactive Patient Education ©2016 [employer].      Anemia, Frequently Asked Questions  WHAT ARE THE SYMPTOMS OF ANEMIA?  · Headache.   · Difficulty thinking.   · Fatigue.   · Shortness of breath.   · Weakness.   · Rapid heartbeat.   AT WHAT POINT ARE PEOPLE CONSIDERED ANEMIC?   This varies with gender and age.   · Both hemoglobin (Hgb) and hematocrit values are used to define anemia. These lab values are obtained from a complete blood count (CBC) test. This is performed at a caregiver's office.   · The normal range of hemoglobin values for adult men is 14.0 g/dL to 17.4 g/dL. For nonpregnant women, values are 12.3 g/dL to 15.3 g/dL.   · The World Health Organization defines anemia as less than 12 g/dL for nonpregnant women and less than 13 g/dL for men.   · For adult males, the  average normal hematocrit is 46%, and the range is 40% to 52%.   · For adult females, the average normal hematocrit is 41%, and the range is 35% to 47%.   · Values that fall below the lower limits can be a sign of anemia and should have further checking (evaluation).   GROUPS OF PEOPLE WHO ARE AT RISK FOR DEVELOPING ANEMIA INCLUDE:   · Infants who are  or taking a formula that is not fortified with iron.   · Children going through a rapid growth spurt. The iron available can not keep up with the needs for a red cell mass which must grow with the child.   · Women in childbearing years. They need iron because of blood loss during menstruation.   · Pregnant women. The growing fetus creates a high demand for iron.   · People with ongoing gastrointestinal blood loss are at risk of developing iron deficiency.   · Individuals with leukemia or cancer who must receive chemotherapy or radiation to treat their disease. The drugs or radiation used to treat these diseases often decreases the bone marrow's ability to make cells of all classes. This includes red blood cells, white blood cells, and platelets.   · Individuals with chronic inflammatory conditions such as rheumatoid arthritis or chronic infections.   · The elderly.   ARE SOME TYPES OF ANEMIA INHERITED?   · Yes, some types of anemia are due to inherited or genetic defects.   · Sickle cell anemia. This occurs most often in people of , , and Mediterranean descent.   · Thalassemia (or Muhammad's anemia). This type is found in people of Mediterranean and Southeast  descent. These types of anemia are common.   · Fanconi. This is rare.   CAN CERTAIN MEDICATIONS CAUSE A PERSON TO BECOME ANEMIC?   Yes. For example, drugs to fight cancer (chemotherapeutic agents) often cause anemia. These drugs can slow the bone marrow's ability to make red blood cells. If there are not enough red blood cells, the body does not get enough oxygen.  WHAT  HEMATOCRIT LEVEL IS REQUIRED TO DONATE BLOOD?   The lower limit of an acceptable hematocrit for blood donors is 38%. If you have a low hematocrit value, you should schedule an appointment with your caregiver.  ARE BLOOD TRANSFUSIONS COMMONLY USED TO CORRECT ANEMIA, AND ARE THEY DANGEROUS?   They are used to treat anemia as a last resort. Your caregiver will find the cause of the anemia and correct it if possible. Most blood transfusions are given because of excessive bleeding at the time of surgery, with trauma, or because of bone marrow suppression in patients with cancer or leukemia on chemotherapy. Blood transfusions are safer than ever before. We also know that blood transfusions affect the immune system and may increase certain risks. There is also a concern for human error. In 1/16,000 transfusions, a patient receives a transfusion of blood that is not matched with his or her blood type.   WHAT IS IRON DEFICIENCY ANEMIA AND CAN I CORRECT IT BY CHANGING MY DIET?   Iron is an essential part of hemoglobin. Without enough hemoglobin, anemia develops and the body does not get the right amount of oxygen. Iron deficiency anemia develops after the body has had a low level of iron for a long time. This is either caused by blood loss, not taking in or absorbing enough iron, or increased demands for iron (like pregnancy or rapid growth).   Foods from animal origin such as beef, chicken, and pork, are good sources of iron. Be sure to have one of these foods at each meal. Vitamin C helps your body absorb iron. Foods rich in Vitamin C include citrus, bell pepper, strawberries, spinach and cantaloupe. In some cases, iron supplements may be needed in order to correct the iron deficiency. In the case of poor absorption, extra iron may have to be given directly into the vein through a needle (intravenously).  I HAVE BEEN DIAGNOSED WITH IRON DEFICIENCY ANEMIA AND MY CAREGIVER PRESCRIBED IRON SUPPLEMENTS. HOW LONG WILL IT TAKE  FOR MY BLOOD TO BECOME NORMAL?   It depends on the degree of anemia at the beginning of treatment. Most people with mild to moderate iron deficiency, anemia will correct the anemia over a period of 2 to 3 months. But after the anemia is corrected, the iron stored by the body is still low. Caregivers often suggest an additional 6 months of oral iron therapy once the anemia has been reversed. This will help prevent the iron deficiency anemia from quickly happening again. Non-anemic adult males should take iron supplements only under the direction of a doctor, too much iron can cause liver damage.   MY HEMOGLOBIN IS 9 G/DL AND I AM SCHEDULED FOR SURGERY. SHOULD I POSTPONE THE SURGERY?   If you have Hgb of 9, you should discuss this with your caregiver right away. Many patients with similar hemoglobin levels have had surgery without problems. If minimal blood loss is expected for a minor procedure, no treatment may be necessary.   If a greater blood loss is expected for more extensive procedures, you should ask your caregiver about being treated with erythropoietin and iron. This is to accelerate the recovery of your hemoglobin to a normal level before surgery. An anemic patient who undergoes high-blood-loss surgery has a greater risk of surgical complications and need for a blood transfusion, which also carries some risk.   I HAVE BEEN TOLD THAT HEAVY MENSTRUAL PERIODS CAUSE ANEMIA. IS THERE ANYTHING I CAN DO TO PREVENT THE ANEMIA?   Anemia that results from heavy periods is usually due to iron deficiency. You can try to meet the increased demands for iron caused by the heavy monthly blood loss by increasing the intake of iron-rich foods. Iron supplements may be required. Discuss your concerns with your caregiver.  WHAT CAUSES ANEMIA DURING PREGNANCY?   Pregnancy places major demands on the body. The mother must meet the needs of both her body and her growing baby. The body needs enough iron and folate to make the  right amount of red blood cells. To prevent anemia while pregnant, the mother should stay in close contact with her caregiver.   Be sure to eat a diet that has foods rich in iron and folate like liver and dark green leafy vegetables. Folate plays an important role in the normal development of a baby's spinal cord. Folate can help prevent serious disorders like spina bifida. If your diet does not provide adequate nutrients, you may want to talk with your caregiver about nutritional supplements.   WHAT IS THE RELATIONSHIP BETWEEN FIBROID TUMORS AND ANEMIA IN WOMEN?   The relationship is usually caused by the increased menstrual blood loss caused by fibroids. Good iron intake may be required to prevent iron deficiency anemia from developing.   Document Released: 07/26/2005 Document Revised: 03/11/2013 Document Reviewed: 01/10/2012  [employer]® Patient Information ©2013 [employer].

## 2017-03-22 NOTE — ED PROVIDER NOTES
"CHIEF COMPLAINT  Chief Complaint   Patient presents with   • Abnormal Labs       HPI  Jenniemandeep Morales is a 36 y.o. female who presents  At the request of her primary care physician due to anemia with a hemoglobin of 6.7. She reports that she saw her primary care physician for the first time in 2 years due to feelings of generalized weakness and dizziness. Denies any chest pain or shortness of breath or syncope. No abdominal pain, nausea, vomiting, diarrhea. Denies any bloody or black stool. Reports that she has had abnormal vaginal bleeding in her entire life with very prolonged though regular menses. She also has vaginal spotting now. Last menstrual period was approximately 2 weeks ago. Denies prior history of blood transfusion. Reports that she does not take iron supplements regularly. Denies any recent fevers or illness.    REVIEW OF SYSTEMS  See HPI for further details. All other systems are negative.     PAST MEDICAL HISTORY   has a past medical history of Urinary incontinence; Anemia; and Hyperlipidemia.    SOCIAL HISTORY  Social History     Social History Main Topics   • Smoking status: Never Smoker    • Smokeless tobacco: Never Used   • Alcohol Use: 0.0 oz/week     0 Standard drinks or equivalent per week      Comment: occ   • Drug Use: No   • Sexual Activity: Not on file       SURGICAL HISTORY   has past surgical history that includes gyn surgery.    CURRENT MEDICATIONS  Home Medications     Reviewed by Hermilo Nelson R.N. (Registered Nurse) on 03/21/17 at 1826  Med List Status: Complete    Medication Last Dose Status          Patient Ender Taking any Medications                        ALLERGIES  No Known Allergies    PHYSICAL EXAM  VITAL SIGNS: /71 mmHg  Pulse 92  Temp(Src) 37.4 °C (99.3 °F)  Resp 16  Ht 1.753 m (5' 9.02\")  Wt 74.3 kg (163 lb 12.8 oz)  BMI 24.18 kg/m2  SpO2 100%  LMP 03/07/2017  Pulse ox interpretation: I interpret this pulse ox as normal.  Constitutional: Alert in " no apparent distress.  HENT: No signs of trauma, Bilateral external ears normal, Nose normal.   Eyes: Pupils are equal and reactive, Conjunctiva normal, Non-icteric.   Cardiovascular: Regular rate and rhythm, no murmurs.   Thorax & Lungs: Normal breath sounds, No respiratory distress, No wheezing, No chest tenderness.   Abdomen: Bowel sounds normal, Soft, No tenderness, No masses, No pulsatile masses. No peritoneal signs.  Skin: Warm, Dry, No erythema, No rash.   Back: No bony tenderness, No CVA tenderness.   Extremities: Intact distal pulses, No edema, No tenderness, No cyanosis  Neurologic: Alert , Normal motor function and gait, Normal sensory function, No focal deficits noted.   Psychiatric: Affect normal, Judgment normal, Mood normal.       DIAGNOSTIC STUDIES / PROCEDURES      LABS  Labs Reviewed   PROTHROMBIN TIME - Abnormal; Notable for the following:     PT 11.9 (*)     All other components within normal limits    Narrative:     Indicate which anticoagulants the patient is on:->UNKNOWN   CBC WITH DIFFERENTIAL - Abnormal; Notable for the following:     RBC 3.43 (*)     Hemoglobin 6.1 (*)     Hematocrit 22.8 (*)     MCV 66.5 (*)     MCH 17.8 (*)     MCHC 26.8 (*)     RDW 50.6 (*)     Lymphocytes 42.80 (*)     All other components within normal limits    Narrative:     Indicate which anticoagulants the patient is on:->UNKNOWN   FERRITIN - Abnormal; Notable for the following:     Ferritin 4.5 (*)     All other components within normal limits    Narrative:     Indicate which anticoagulants the patient is on:->UNKNOWN   IRON/TOTAL IRON BIND - Abnormal; Notable for the following:     Iron <10 (*)     Total Iron Binding 568 (*)     All other components within normal limits    Narrative:     Indicate which anticoagulants the patient is on:->UNKNOWN   HCG QUAL SERUM   APTT    Narrative:     Indicate which anticoagulants the patient is on:->UNKNOWN   COD (ADULT)   BASIC METABOLIC PANEL    Narrative:     Indicate which  anticoagulants the patient is on:->UNKNOWN   ESTIMATED GFR    Narrative:     Indicate which anticoagulants the patient is on:->UNKNOWN   ABO CONFIRMATION   RELEASE RED BLOOD CELLS   TRANSFUSE RED BLOOD CELLS-NURSING COMMUNICATION       RADIOLOGY  US-GYN-PELVIS TRANSVAGINAL   Final Result      1.  Multiple uterine fibroids.   2.  Distortion of the endometrial stripe in the lower uterine segment fibroid dorsal 3 cm submucosal fibroid.   3.  RIGHT ovarian cyst versus dominant follicle.   4.  No evidence for ovarian torsion.   5.  Small amount of free fluid, nonspecific.          COURSE & MEDICAL DECISION MAKING  Pertinent Labs & Imaging studies reviewed. (See chart for details)  36 y.o.  Female presenting with symptomatic anemia with dizziness and generalized weakness. Vitals are unremarkable without hypotension or tachycardia. The only source of bleeding at this time as the patient's reported history of prolonged menstrual periods that has been an ongoing long-standing issue for the patient. The patient does have a history of fibroid uterus which may be contributing. She also may benefit from an endometrial biopsy from a gynecologist.    Prolonged discussion was had with the patient regarding risks and benefits of blood transfusion. Repeat hemoglobin showing further decline 6.1. Recommended transfusion given the extremely low hemoglobin and the patient's symptomatology. Patient was also found to have low iron and with microcytic anemia. She was strongly encouraged to take iron supplements. Given that the patient does have a source of bleeding and the patient had not had prior problems with significant anemia, this is less likely a blood disorder such as thalassemia. No reason to suggest acute hemolysis at this time.  No recent fevers or illness.    Contacted gynecology given the likely source of the patient's blood loss being from excessive vaginal bleeding.  Spoke with Dr. Johnson, who reports that the patient should  "follow up promptly for further evaluation as an outpatient. The patient is hemodynamically stable and in no distress and reports feeling improved after blood transfusion. Only faint vaginal spotting and is no active hemorrhage. The patient appears to be a good candidate for further outpatient management of her anemia. Also instructed to follow-up with her primary care physician for further anemia management.    The patient will return for worsening symptoms or failure of improvement and is stable at the time of discharge. The patient verbalizes understanding in their own words.    Savannah Rothman M.D.  [address]  [address]  [place] [address] [phone]    In 2 days      [facility], Emergency Dept  [address]  [place] [address] [phone]    As needed, If symptoms worsen    Bev Johnson M.D.  [address]   [address]  [place] [address] [phone]    Call in 1 day        /66 mmHg  Pulse 76  Temp(Src) 36.8 °C (98.3 °F)  Resp 16  Ht 1.753 m (5' 9.02\")  Wt 74.3 kg (163 lb 12.8 oz)  BMI 24.18 kg/m2  SpO2 98%  LMP 03/07/2017    FINAL IMPRESSION  1. Microcytic anemia    2. Vaginal spotting            Electronically signed by: Neil Khan, 3/21/2017 6:29 PM      "

## 2017-03-22 NOTE — ED NOTES
1845 Iv placed , labs drawn and taken to lab. poc update given to pt, results pending at this time  1930 cod drawn , update given to pt  1940 labs resulted , md aware . poc update given to pt. U/s pending

## 2017-03-22 NOTE — ED NOTES
2045 pt back from u/s  2100 blood consent signed. Blood transfusion started . Update given to pt and family

## 2017-03-27 ENCOUNTER — TELEPHONE (OUTPATIENT)
Dept: MEDICAL GROUP | Facility: MEDICAL CENTER | Age: 37
End: 2017-03-27

## 2017-03-27 ENCOUNTER — OFFICE VISIT (OUTPATIENT)
Dept: MEDICAL GROUP | Facility: MEDICAL CENTER | Age: 37
End: 2017-03-27
Attending: INTERNAL MEDICINE
Payer: COMMERCIAL

## 2017-03-27 ENCOUNTER — HOSPITAL ENCOUNTER (OUTPATIENT)
Dept: LAB | Facility: MEDICAL CENTER | Age: 37
End: 2017-03-27
Attending: INTERNAL MEDICINE
Payer: COMMERCIAL

## 2017-03-27 VITALS
HEIGHT: 69 IN | TEMPERATURE: 98.3 F | HEART RATE: 71 BPM | WEIGHT: 168.21 LBS | BODY MASS INDEX: 24.91 KG/M2 | OXYGEN SATURATION: 99 % | DIASTOLIC BLOOD PRESSURE: 64 MMHG | SYSTOLIC BLOOD PRESSURE: 116 MMHG

## 2017-03-27 DIAGNOSIS — D25.9 UTERINE LEIOMYOMA, UNSPECIFIED LOCATION: ICD-10-CM

## 2017-03-27 DIAGNOSIS — N92.1 MENOMETRORRHAGIA: ICD-10-CM

## 2017-03-27 DIAGNOSIS — D64.9 ANEMIA, UNSPECIFIED TYPE: ICD-10-CM

## 2017-03-27 DIAGNOSIS — D50.8 OTHER IRON DEFICIENCY ANEMIAS: ICD-10-CM

## 2017-03-27 DIAGNOSIS — Z00.00 HEALTH CARE MAINTENANCE: ICD-10-CM

## 2017-03-27 LAB
BASOPHILS # BLD AUTO: 0.4 % (ref 0–1.8)
BASOPHILS # BLD: 0.02 K/UL (ref 0–0.12)
COMMENT 1642: NORMAL
EOSINOPHIL # BLD AUTO: 0.04 K/UL (ref 0–0.51)
EOSINOPHIL NFR BLD: 0.8 % (ref 0–6.9)
ERYTHROCYTE [DISTWIDTH] IN BLOOD BY AUTOMATED COUNT: 60 FL (ref 35.9–50)
HCT VFR BLD AUTO: 25.8 % (ref 37–47)
HGB BLD-MCNC: 7.3 G/DL (ref 12–16)
HYPOCHROMIA BLD QL SMEAR: ABNORMAL
IMM GRANULOCYTES # BLD AUTO: 0.01 K/UL (ref 0–0.11)
IMM GRANULOCYTES NFR BLD AUTO: 0.2 % (ref 0–0.9)
IRON SATN MFR SERPL: 3 % (ref 15–55)
IRON SERPL-MCNC: 15 UG/DL (ref 40–170)
LYMPHOCYTES # BLD AUTO: 1.88 K/UL (ref 1–4.8)
LYMPHOCYTES NFR BLD: 35.8 % (ref 22–41)
MACROCYTES BLD QL SMEAR: ABNORMAL
MCH RBC QN AUTO: 19.7 PG (ref 27–33)
MCHC RBC AUTO-ENTMCNC: 28.3 G/DL (ref 33.6–35)
MCV RBC AUTO: 69.5 FL (ref 81.4–97.8)
MICROCYTES BLD QL SMEAR: ABNORMAL
MONOCYTES # BLD AUTO: 0.35 K/UL (ref 0–0.85)
MONOCYTES NFR BLD AUTO: 6.7 % (ref 0–13.4)
NEUTROPHILS # BLD AUTO: 2.95 K/UL (ref 2–7.15)
NEUTROPHILS NFR BLD: 56.1 % (ref 44–72)
NRBC # BLD AUTO: 0 K/UL
NRBC BLD AUTO-RTO: 0 /100 WBC
PLATELET # BLD AUTO: 201 K/UL (ref 164–446)
PLATELET BLD QL SMEAR: NORMAL
PMV BLD AUTO: 8.8 FL (ref 9–12.9)
POLYCHROMASIA BLD QL SMEAR: NORMAL
RBC # BLD AUTO: 3.71 M/UL (ref 4.2–5.4)
RBC BLD AUTO: PRESENT
TIBC SERPL-MCNC: 528 UG/DL (ref 250–450)
WBC # BLD AUTO: 5.3 K/UL (ref 4.8–10.8)

## 2017-03-27 PROCEDURE — 99214 OFFICE O/P EST MOD 30 MIN: CPT | Performed by: INTERNAL MEDICINE

## 2017-03-27 PROCEDURE — 85025 COMPLETE CBC W/AUTO DIFF WBC: CPT

## 2017-03-27 PROCEDURE — 83550 IRON BINDING TEST: CPT

## 2017-03-27 PROCEDURE — 36415 COLL VENOUS BLD VENIPUNCTURE: CPT

## 2017-03-27 PROCEDURE — 83540 ASSAY OF IRON: CPT

## 2017-03-27 RX ORDER — LANOLIN ALCOHOL/MO/W.PET/CERES
325 CREAM (GRAM) TOPICAL EVERY 12 HOURS
Qty: 90 TAB | Refills: 0 | Status: SHIPPED | OUTPATIENT
Start: 2017-03-27 | End: 2017-05-16 | Stop reason: SDUPTHER

## 2017-03-27 NOTE — MR AVS SNAPSHOT
"        Jennie Morales   3/27/2017 7:20 AM   Office Visit   MRN: [id number]    Department:  [place] [address]   Dept Phone:  [phone]    Description:  Female : 1980   Provider:  Savannah Rothman M.D.           Reason for Visit     Follow-Up on labs       Allergies as of 3/27/2017     No Known Allergies      Vital Signs     Blood Pressure Pulse Temperature Height Weight Body Mass Index    116/64 mmHg 71 36.8 °C (98.3 °F) 1.753 m (5' 9.02\") 76.3 kg (168 lb 3.4 oz) 24.83 kg/m2    Oxygen Saturation Last Menstrual Period Smoking Status             99% 2017 Never Smoker          Basic Information     Date Of Birth Sex Race Ethnicity Preferred Language    1980 Female Black or  Non- English      Your appointments     2017  7:00 AM   ANNUAL EXAM PREVENTATIVE with Savannah Rothman M.D.   [facility]    [address]  [address]  [place] [id number] [phone]            2017  7:00 AM   Established Patient with Savannah Rothman M.D.   [facility]    [address]  [address]  [place] [address] [phone]           You will be receiving a confirmation call a few days before your appointment from our automated call confirmation system.              Problem List              ICD-10-CM Priority Class Noted - Resolved    Urinary incontinence R32   3/20/2017 - Present    Health care maintenance Z00.00   3/20/2017 - Present      Health Maintenance        Date Due Completion Dates    IMM DTaP/Tdap/Td Vaccine (1 - Tdap) 1998 ---    PAP SMEAR 2000 ---    IMM INFLUENZA (1) 2016 ---            Current Immunizations     Tuberculin Skin Test 2017 12:08 PM, 3/18/2015 12:40 PM      Below and/or attached are the medications your provider expects you to take. Review all of your home medications and newly ordered medications with your " provider and/or pharmacist. Follow medication instructions as directed by your provider and/or pharmacist. Please keep your medication list with you and share with your provider. Update the information when medications are discontinued, doses are changed, or new medications (including over-the-counter products) are added; and carry medication information at all times in the event of emergency situations     Allergies:  No Known Allergies          Medications  Valid as of: March 27, 2017 -  7:39 AM    Generic Name Brand Name Tablet Size Instructions for use    Ferrous Sulfate (Tablet Delayed Response) ferrous sulfate 325 (65 FE) MG Take 1 Tab by mouth every 12 hours.        .                 Medicines prescribed today were sent to:     [facility] - [place], [place] - [address]    [address] [place] [address]    Phone: [phone] Fax: [phone]    Open 24 Hours?: No      Medication refill instructions:       If your prescription bottle indicates you have medication refills left, it is not necessary to call your provider’s office. Please contact your pharmacy and they will refill your medication.    If your prescription bottle indicates you do not have any refills left, you may request refills at any time through one of the following ways: The online SeekPanda system (except Urgent Care), by calling your provider’s office, or by asking your pharmacy to contact your provider’s office with a refill request. Medication refills are processed only during regular business hours and may not be available until the next business day. Your provider may request additional information or to have a follow-up visit with you prior to refilling your medication.   *Please Note: Medication refills are assigned a new Rx number when refilled electronically. Your pharmacy may indicate that no refills were authorized even though a new prescription for the same medication is available at the pharmacy. Please  request the medicine by name with the pharmacy before contacting your provider for a refill.           Amanda Huff DBA SecuRecovery Access Code: [email or web address]  Expires: 4/19/2017  7:10 AM    Amanda Huff DBA SecuRecovery  A secure, online tool to manage your health information     [employer]’s Amanda Huff DBA SecuRecovery® is a secure, online tool that connects you to your personalized health information from the privacy of your home -- day or night - making it very easy for you to manage your healthcare. Once the activation process is completed, you can even access your medical information using the Amanda Huff DBA SecuRecovery nadja, which is available for free in the Apple Nadja store or Google Play store.     Amanda Huff DBA SecuRecovery provides the following levels of access (as shown below):   My Chart Features   [facility] Doctor [facility]  Specialists [facility]  Urgent  Care Non-[facility]  Primary Care  Doctor   Email your healthcare team securely and privately 24/7 X X X    Manage appointments: schedule your next appointment; view details of past/upcoming appointments X      Request prescription refills. X      View recent personal medical records, including lab and immunizations X X X X   View health record, including health history, allergies, medications X X X X   Read reports about your outpatient visits, procedures, consult and ER notes X X X X   See your discharge summary, which is a recap of your hospital and/or ER visit that includes your diagnosis, lab results, and care plan. X X       How to register for Amanda Huff DBA SecuRecovery:  1. Go to  [email or web address].  2. Click on the Sign Up Now box, which takes you to the New Member Sign Up page. You will need to provide the following information:  a. Enter your Amanda Huff DBA SecuRecovery Access Code exactly as it appears at the top of this page. (You will not need to use this code after you’ve completed the sign-up process. If you do not sign up before the expiration date, you must request a new code.)   b. Enter your date of birth.   c. Enter your home email address.   d. Click  Submit, and follow the next screen’s instructions.  3. Create a TwinStratat ID. This will be your [employer] login ID and cannot be changed, so think of one that is secure and easy to remember.  4. Create a TwinStratat password. You can change your password at any time.  5. Enter your Password Reset Question and Answer. This can be used at a later time if you forget your password.   6. Enter your e-mail address. This allows you to receive e-mail notifications when new information is available in [employer].  7. Click Sign Up. You can now view your health information.    For assistance activating your [employer] account, call [phone]

## 2017-03-27 NOTE — PROGRESS NOTES
CHIEF COMPLAINT  Chief Complaint   Patient presents with   • Follow-Up     on labs    Blood loss anemia    HPI  Patient is a 36 y.o. female patient who presents today for the following     Iron deficiency anemia due to menometrorrhagia/Uterinefibroids  The patient has history of fibroids and menometrorrhagia, was seen in the office one week ago complaining of fatigue.   She was found to have hemoglobin of 6.7, advised to go to the ER.   At our visit was on 3/21/17, the note was reviewed  · Hemoglobin was 6.1  · She was given one transfusion  · Pelvic ultrasound showed fibroids  · Gynecology was consulted, advised to schedule office visit as soon as possible  · She was given iron tablets, that she did not start    After the transfusion, fatigue significantly improved.     Reviewed PMH, PSH, FH, SH, ALL, HCM/IMM, no changes  Reviewed MEDS, no changes    Patient Active Problem List    Diagnosis Date Noted   • Uterine leiomyoma 03/27/2017   • Urinary incontinence 03/20/2017   • Health care maintenance 03/20/2017     CURRENT MEDICATIONS  Current Outpatient Prescriptions   Medication Sig Dispense Refill   • ferrous sulfate 325 (65 FE) MG EC tablet Take 1 Tab by mouth every 12 hours. 30 Tab 0     No current facility-administered medications for this visit.     ALLERGIES  Allergies: Review of patient's allergies indicates no known allergies.  PAST MEDICAL HISTORY  Past Medical History   Diagnosis Date   • Urinary incontinence    • Anemia    • Hyperlipidemia      SURGICAL HISTORY  She  has past surgical history that includes gyn surgery.  SOCIAL HISTORY  Social History   Substance Use Topics   • Smoking status: Never Smoker    • Smokeless tobacco: Never Used   • Alcohol Use: 0.0 oz/week     0 Standard drinks or equivalent per week      Comment: occ     Social History     Social History Narrative     FAMILY HISTORY  Family History   Problem Relation Age of Onset   • Cancer Sister      cervical cance   • Cancer Maternal  "Grandmother    • Diabetes Father    • Heart Disease Neg Hx    • Hypertension Neg Hx    • Hyperlipidemia Mother    • Hyperlipidemia Father    • Hyperlipidemia Sister    • Psychiatry Mother    • Psychiatry Sister    • Thyroid Neg Hx      No family status information on file.       ROS   Constitutional: Negative for fever, chills.  HENT: Negative for congestion, sore throat.  Eyes: Negative for blurred vision.   Respiratory: Negative for cough, shortness of breath.  Cardiovascular: Negative for chest pain, palpitations.   Gastrointestinal: Negative for heartburn, nausea, abdominal pain.   Genitourinary: Negative for dysuria.  Musculoskeletal: Negative for significant myalgias, back pain and joint pain.    Skin: Negative for rash and itching.   Neuro: Negative for dizziness, weakness and headaches.   Endo/Heme/Allergies: Does not bruise/bleed easily.   Psychiatric/Behavioral: Negative for depression, anxiety    PHYSICAL EXAM   Blood pressure 116/64, pulse 71, temperature 36.8 °C (98.3 °F), height 1.753 m (5' 9.02\"), weight 76.3 kg (168 lb 3.4 oz), last menstrual period 03/07/2017, SpO2 99 %. Body mass index is 24.83 kg/(m^2).  General:  NAD, well appearing  HEENT:   NC/AT, PERRLA, EOMI, TMs are clear. Oropharyngeal mucosa is pink,  without lesions;  no cervical / supraclavicular  lymphadenopathy, no thyromegaly.    Cardiovascular: RRR.   No m/r/g. No carotid bruits .      Lungs:   CTAB, no w/r/r, no respiratory distress.  Abdomen: Soft, NT/ND + BS; no suprapubic tenderness; no masses or hepatosplenomegaly.  Extremities:  2+ DP and radial pulses bilaterally.  No c/c/e.   Skin:  Warm, dry.  No erythema. No rash.   Neurologic: Alert & oriented x 3.  No focal deficits.  Psychiatric:  Affect normal, mood normal, judgment normal.    LABS     Labs are reviewed and discussed with a patient    Lab Results   Component Value Date/Time    CHOLESTEROL,* 03/20/2017 08:26 AM    * 03/20/2017 08:26 AM    HDL 78 03/20/2017 " 08:26 AM    TRIGLYCERIDES 66 03/20/2017 08:26 AM       Lab Results   Component Value Date/Time    SODIUM 136 03/21/2017 06:45 PM    POTASSIUM 3.7 03/21/2017 06:45 PM    CHLORIDE 107 03/21/2017 06:45 PM    CO2 22 03/21/2017 06:45 PM    GLUCOSE 98 03/21/2017 06:45 PM    BUN 11 03/21/2017 06:45 PM    CREATININE 0.75 03/21/2017 06:45 PM     Lab Results   Component Value Date/Time    ALKALINE PHOSPHATASE 42 03/20/2017 08:26 AM    AST(SGOT) 26 03/20/2017 08:26 AM    ALT(SGPT) 11 03/20/2017 08:26 AM    TOTAL BILIRUBIN 0.6 03/20/2017 08:26 AM          Ref. Range 3/27/2017 08:05   Iron Latest Ref Range:  ug/dL 15 (L)   Total Iron Binding Latest Ref Range: 250-450 ug/dL 528 (H)   % Saturation Latest Ref Range: 15-55 % 3 (L)      5/29/2015  3/20/2017  3/21/2017  3/27/2017   WBC 5.2 3.5 (L) 4.8 5.3   RBC 4.39 3.68 (L) 3.43 (L) 3.71 (L)   Hemoglobin 10.7 (L) 6.7 (L) 6.1 (L) 7.3 (L)   Hematocrit 35.7 (L) 24.3 (L) 22.8 (L) 25.8 (L)   MCV 81.3 (L) 66.0 (L) 66.5 (L) 69.5 (L)   MCH 24.4 (L) 18.2 (L) 17.8 (L) 19.7 (L)   MCHC 30.0 (L) 27.6 (L) 26.8 (L) 28.3 (L)   RDW  52.0 (H) 50.6 (H) 60.0 (H)   Platelet Count 206 373 356 201   MPV 9.9 8.6 (L) 9.0 8.8 (L)   Neutrophils-Polys 55.00 53.00 49.50 56.10   Neutrophils (Absolute) 2.88 1.83 (L) 2.36 2.95   Lymphocytes 37.70 40.60 42.80 (H) 35.80     IMAGING     None    ASSESMENT AND PLAN        1. Other iron deficiency anemias  Improved fatigue.   Advised to pursue with GYN evaluation in 2 weeks.  She is given iron tablets, to take twice daily.     - IRON/TOTAL IRON BIND; Future  - CBC WITH DIFFERENTIAL; Future    2. Uterine leiomyoma, unspecified location  As above    3. Menometrorrhagia  As above    4. Health care maintenance  Flu: declined  PAP: Pending GYN visit in 2 weeks    Counseling:   - Smoking:  Nonsmoker    Followup: Return in about 3 months (around 6/27/2017) for Short.    All questions are answered.    Please note that this dictation was created using voice recognition  software, and that there might be errors of rai and possibly content.

## 2017-05-10 ENCOUNTER — OFFICE VISIT (OUTPATIENT)
Dept: MEDICAL GROUP | Facility: MEDICAL CENTER | Age: 37
End: 2017-05-10
Payer: COMMERCIAL

## 2017-05-10 VITALS
BODY MASS INDEX: 24.88 KG/M2 | OXYGEN SATURATION: 98 % | TEMPERATURE: 97.2 F | DIASTOLIC BLOOD PRESSURE: 68 MMHG | HEART RATE: 93 BPM | SYSTOLIC BLOOD PRESSURE: 120 MMHG | HEIGHT: 69 IN | WEIGHT: 168 LBS

## 2017-05-10 DIAGNOSIS — N94.6 DYSMENORRHEA: ICD-10-CM

## 2017-05-10 DIAGNOSIS — Z00.00 HEALTH CARE MAINTENANCE: ICD-10-CM

## 2017-05-10 DIAGNOSIS — N92.1 MENOMETRORRHAGIA: ICD-10-CM

## 2017-05-10 DIAGNOSIS — D50.0 BLOOD LOSS ANEMIA: ICD-10-CM

## 2017-05-10 DIAGNOSIS — D25.9 UTERINE LEIOMYOMA, UNSPECIFIED LOCATION: ICD-10-CM

## 2017-05-10 PROCEDURE — 99214 OFFICE O/P EST MOD 30 MIN: CPT | Performed by: INTERNAL MEDICINE

## 2017-05-10 RX ORDER — MEFENAMIC ACID 250 MG/1
CAPSULE ORAL
Qty: 30 CAP | Refills: 1 | Status: SHIPPED | OUTPATIENT
Start: 2017-05-10 | End: 2022-03-16

## 2017-05-10 RX ORDER — NAPROXEN 500 MG/1
TABLET ORAL
Qty: 60 TAB | Refills: 1 | Status: SHIPPED | OUTPATIENT
Start: 2017-05-10 | End: 2018-11-11 | Stop reason: SDUPTHER

## 2017-05-10 NOTE — MR AVS SNAPSHOT
"        Jennie Morales   5/10/2017 7:20 AM   Office Visit   MRN: [id number]    Department:  [place] [address]   Dept Phone:  [phone]    Description:  Female : 1980   Provider:  Savannah Rothman M.D.           Reason for Visit     Abdominal Pain bad cramps      Allergies as of 5/10/2017     No Known Allergies      You were diagnosed with     Menometrorrhagia   [683776]       Dysmenorrhea   [625.3.ICD-9-CM]       Uterine leiomyoma, unspecified location   [9239752]       Blood loss anemia   [777871]       Health care maintenance   [689682]         Vital Signs     Blood Pressure Pulse Temperature Height Weight Body Mass Index    120/68 mmHg 93 36.2 °C (97.2 °F) 1.753 m (5' 9.02\") 76.204 kg (168 lb) 24.80 kg/m2    Oxygen Saturation Smoking Status                98% Never Smoker           Basic Information     Date Of Birth Sex Race Ethnicity Preferred Language    1980 Female Black or  Non- English      Your appointments     2017  3:00 PM   New Patient with Bev Johnson M.D.   [facility] ([address], [address]  [facility] [address] [phone]            Aug 15, 2017  7:20 AM   Established Patient with Savannah Rothman M.D.   [facility]    [address]  [address]  [facility] [address] [phone]           You will be receiving a confirmation call a few days before your appointment from our automated call confirmation system.              Problem List              ICD-10-CM Priority Class Noted - Resolved    Urinary incontinence R32   3/20/2017 - Present    Health care maintenance Z00.00   3/20/2017 - Present    Uterine leiomyoma D25.9   3/27/2017 - Present      Health Maintenance        Date Due Completion Dates    IMM DTaP/Tdap/Td Vaccine (1 - Tdap) 1998 ---    PAP SMEAR 2000 ---            Current Immunizations     Tuberculin Skin Test " 2/14/2017 12:08 PM, 3/18/2015 12:40 PM      Below and/or attached are the medications your provider expects you to take. Review all of your home medications and newly ordered medications with your provider and/or pharmacist. Follow medication instructions as directed by your provider and/or pharmacist. Please keep your medication list with you and share with your provider. Update the information when medications are discontinued, doses are changed, or new medications (including over-the-counter products) are added; and carry medication information at all times in the event of emergency situations     Allergies:  No Known Allergies          Medications  Valid as of: May 10, 2017 -  7:46 AM    Generic Name Brand Name Tablet Size Instructions for use    Ferrous Sulfate (Tablet Delayed Response) ferrous sulfate 325 (65 FE) MG Take 1 Tab by mouth every 12 hours.        Mefenamic Acid (Cap) Mefenamic Acid 250 MG Initial: 500 mg, then 250 mg every 6 hours as needed; usually not to exceed 1 week        Naproxen (Tab) NAPROSYN 500 MG Start 1/2 tab 2-4 times aily, max dose 1000 mg per day        .                 Medicines prescribed today were sent to:     [facility] [address] [place] [address], [place] - [address]    [address] [place] [address]    Phone: [phone] Fax: [phone]    Open 24 Hours?: No      Medication refill instructions:       If your prescription bottle indicates you have medication refills left, it is not necessary to call your provider’s office. Please contact your pharmacy and they will refill your medication.    If your prescription bottle indicates you do not have any refills left, you may request refills at any time through one of the following ways: The online Bloodhound system (except Urgent Care), by calling your provider’s office, or by asking your pharmacy to contact your provider’s office with a refill request. Medication refills are processed only during regular business hours and  may not be available until the next business day. Your provider may request additional information or to have a follow-up visit with you prior to refilling your medication.   *Please Note: Medication refills are assigned a new Rx number when refilled electronically. Your pharmacy may indicate that no refills were authorized even though a new prescription for the same medication is available at the pharmacy. Please request the medicine by name with the pharmacy before contacting your provider for a refill.        Your To Do List     Future Labs/Procedures Complete By Expires    CBC WITH DIFFERENTIAL  As directed 5/11/2018      Referral     A referral request has been sent to our patient care coordination department. Please allow 3-5 business days for us to process this request and contact you either by phone or mail. If you do not hear from us by the 5th business day, please call us at [phone].           Hydrobolt Access Code: [email or web address]  Expires: 5/30/2017 10:40 AM    Hydrobolt  A secure, online tool to manage your health information     [employer]’s Hydrobolt® is a secure, online tool that connects you to your personalized health information from the privacy of your home -- day or night - making it very easy for you to manage your healthcare. Once the activation process is completed, you can even access your medical information using the Hydrobolt nadja, which is available for free in the Apple Nadja store or Google Play store.     Hydrobolt provides the following levels of access (as shown below):   My Chart Features   [facility] Primary Care Doctor [facility]  Specialists [facility]  Urgent  Care Non-[facility]  Primary Care  Doctor   Email your healthcare team securely and privately 24/7 X X X    Manage appointments: schedule your next appointment; view details of past/upcoming appointments X      Request prescription refills. X      View recent personal medical records, including lab and immunizations X X X X   View health  record, including health history, allergies, medications X X X X   Read reports about your outpatient visits, procedures, consult and ER notes X X X X   See your discharge summary, which is a recap of your hospital and/or ER visit that includes your diagnosis, lab results, and care plan. X X       How to register for Lanyon:  1. Go to  [email or web address].  2. Click on the Sign Up Now box, which takes you to the New Member Sign Up page. You will need to provide the following information:  a. Enter your Lanyon Access Code exactly as it appears at the top of this page. (You will not need to use this code after you’ve completed the sign-up process. If you do not sign up before the expiration date, you must request a new code.)   b. Enter your date of birth.   c. Enter your home email address.   d. Click Submit, and follow the next screen’s instructions.  3. Create a Lanyon ID. This will be your Lanyon login ID and cannot be changed, so think of one that is secure and easy to remember.  4. Create a Entradat password. You can change your password at any time.  5. Enter your Password Reset Question and Answer. This can be used at a later time if you forget your password.   6. Enter your e-mail address. This allows you to receive e-mail notifications when new information is available in Lanyon.  7. Click Sign Up. You can now view your health information.    For assistance activating your Lanyon account, call [phone]

## 2017-05-10 NOTE — PROGRESS NOTES
CHIEF COMPLAINT  Chief Complaint   Patient presents with   • Abdominal Pain     bad cramps     HPI  Patient is a 36 y.o. female patient who presents today for the following     Menometrorrhagia/dysmenorrhea, Uterine fibroids, blood loss anemia  The patient has history of fibroids and menometrorrhagia: heavy, irregular periods.   It was complicated with blood loss anemia in 3/2017,     - lowest Hgb of 6.1, requiring PRBC x 1;    - The last Hgb was 7.3 on 3/27/17    - She continued prescription iron supplement  · Also, she was advised to schedule a gynecology office visit that was not done        - She schedule GYN appointment within last 7 days for 6/13/70    LMP: 5/4/17, accompanied with more severe menstrual cramps.   The pain is severe, crampy:   - menstrual pain grade score is 3   - flow is high/severe, no blood clots    - TSH was normal in 3/2017.    Meds used: Ibuprofen 200 mg one dose and Midol, 1 tablet   - Did not help at all    Vaginal d/c was normal.   No h/o or risk for STDs.     Reviewed PMH, PSH, FH, SH, ALL, HCM/IMM, no changes  Reviewed MEDS, no changes    Patient Active Problem List    Diagnosis Date Noted   • Uterine leiomyoma 03/27/2017   • Urinary incontinence 03/20/2017   • Health care maintenance 03/20/2017     CURRENT MEDICATIONS  Current Outpatient Prescriptions   Medication Sig Dispense Refill   • ferrous sulfate 325 (65 FE) MG EC tablet Take 1 Tab by mouth every 12 hours. 90 Tab 0     No current facility-administered medications for this visit.     ALLERGIES  Allergies: Review of patient's allergies indicates no known allergies.  PAST MEDICAL HISTORY  Past Medical History   Diagnosis Date   • Urinary incontinence    • Anemia    • Hyperlipidemia      SURGICAL HISTORY  She  has past surgical history that includes gyn surgery.  SOCIAL HISTORY  Social History   Substance Use Topics   • Smoking status: Never Smoker    • Smokeless tobacco: Never Used   • Alcohol Use: 0.0 oz/week     0 Standard  "drinks or equivalent per week      Comment: occ     Social History     Social History Narrative     FAMILY HISTORY  Family History   Problem Relation Age of Onset   • Cancer Sister      cervical cance   • Cancer Maternal Grandmother    • Diabetes Father    • Heart Disease Neg Hx    • Hypertension Neg Hx    • Hyperlipidemia Mother    • Hyperlipidemia Father    • Hyperlipidemia Sister    • Psychiatry Mother    • Psychiatry Sister    • Thyroid Neg Hx      No family status information on file.     ROS   Constitutional: Negative for fever, chills.  HENT: Negative for congestion, sore throat.  Eyes: Negative for blurred vision.   Respiratory: Negative for cough, shortness of breath.  Cardiovascular: Negative for chest pain, palpitations.   Gastrointestinal: Negative for heartburn, nausea, abdominal pain.   Genitourinary: Negative for dysuria.  And per HPI.  Musculoskeletal: Negative for significant myalgias, back pain and joint pain.   Skin: Negative for rash and itching.   Neuro: Negative for dizziness, weakness and headaches.   Endo/Heme/Allergies: Does not bruise easily.   Psychiatric/Behavioral: Negative for depression    PHYSICAL EXAM   Blood pressure 120/68, pulse 93, temperature 36.2 °C (97.2 °F), height 1.753 m (5' 9.02\"), weight 76.204 kg (168 lb), SpO2 98 %. Body mass index is 24.8 kg/(m^2).  General:  NAD, well appearing  HEENT:   NC/AT, PERRLA, EOMI, TMs are clear. Oropharyngeal mucosa is pink,  without lesions;  no cervical / supraclavicular  lymphadenopathy, no thyromegaly.    Cardiovascular: RRR.   No m/r/g. No carotid bruits .      Lungs:   CTAB, no w/r/r, no respiratory distress.  Abdomen: Soft, NT/ND + BS; no suprapubic tenderness; no masses or hepatosplenomegaly.  Extremities:  2+ DP and radial pulses bilaterally.  No c/c/e.   Skin:  Warm, dry.  No erythema. No rash.   Neurologic: Alert & oriented x 3.  No focal deficits.  Psychiatric:  Affect normal, mood normal, judgment normal.    LABS     Labs are " reviewed and discussed with a patient    Lab Results   Component Value Date/Time    CHOLESTEROL,* 03/20/2017 08:26 AM    * 03/20/2017 08:26 AM    HDL 78 03/20/2017 08:26 AM    TRIGLYCERIDES 66 03/20/2017 08:26 AM       Lab Results   Component Value Date/Time    SODIUM 136 03/21/2017 06:45 PM    POTASSIUM 3.7 03/21/2017 06:45 PM    CHLORIDE 107 03/21/2017 06:45 PM    CO2 22 03/21/2017 06:45 PM    GLUCOSE 98 03/21/2017 06:45 PM    BUN 11 03/21/2017 06:45 PM    CREATININE 0.75 03/21/2017 06:45 PM     Lab Results   Component Value Date/Time    ALKALINE PHOSPHATASE 42 03/20/2017 08:26 AM    AST(SGOT) 26 03/20/2017 08:26 AM    ALT(SGPT) 11 03/20/2017 08:26 AM    TOTAL BILIRUBIN 0.6 03/20/2017 08:26 AM      Ref. Range 5/29/2015 11:39 3/20/2017 08:26   TSH 0.350-5.500 uIU/mL 1.010 1.050     Lab Results   Component Value Date/Time    WBC 5.3 03/27/2017 08:05 AM    RBC 3.71* 03/27/2017 08:05 AM    HEMOGLOBIN 7.3* 03/27/2017 08:05 AM    HEMATOCRIT 25.8* 03/27/2017 08:05 AM    MCV 69.5* 03/27/2017 08:05 AM    MCH 19.7* 03/27/2017 08:05 AM    MCHC 28.3* 03/27/2017 08:05 AM    MPV 8.8* 03/27/2017 08:05 AM    NEUTROPHILS-POLYS 56.10 03/27/2017 08:05 AM    LYMPHOCYTES 35.80 03/27/2017 08:05 AM    MONOCYTES 6.70 03/27/2017 08:05 AM    EOSINOPHILS 0.80 03/27/2017 08:05 AM    BASOPHILS 0.40 03/27/2017 08:05 AM    HYPOCHROMIA 1+ 03/27/2017 08:05 AM    ANISOCYTOSIS 1+ 03/20/2017 08:26 AM      IMAGING     None    ASSESMENT AND PLAN        1. Menometrorrhagia  - CBC WITH DIFFERENTIAL; Future  - naproxen (NAPROSYN) 500 MG Tab; Start 1/2 tab 2-4 times aily, max dose 1000 mg per day  Dispense: 60 Tab; Refill: 1  - Mefenamic Acid 250 MG Cap; Initial: 500 mg, then 250 mg every 6 hours as needed; usually not to exceed 1 week  Dispense: 30 Cap; Refill: 1    2. Dysmenorrhea  - REFERRAL TO GYNECOLOGY  - naproxen (NAPROSYN) 500 MG Tab; Start 1/2 tab 2-4 times aily, max dose 1000 mg per day  Dispense: 60 Tab; Refill: 1  - Mefenamic  Acid 250 MG Cap; Initial: 500 mg, then 250 mg every 6 hours as needed; usually not to exceed 1 week  Dispense: 30 Cap; Refill: 1    Steps for rx:   1. Naproxen  2. Mefenamic acid  3. Pain meds, if previous meds (as above)  Fail  4. Advised to schedule GYN OV ASAP, or go to the ER    3. Uterine leiomyoma, unspecified location  - REFERRAL TO GYNECOLOGY    4. Blood loss anemia  Need CBC follow-up.    Advised to continue iron supplement    - CBC WITH DIFFERENTIAL; Future    5. Health care maintenance  Pending PAP smear    Counseling:   - Smoking:  Nonsmoker    Followup: in 2-3 months    All questions are answered.    Please note that this dictation was created using voice recognition software, and that there might be errors of rai and possibly content.

## 2017-05-12 ENCOUNTER — TELEPHONE (OUTPATIENT)
Dept: MEDICAL GROUP | Facility: MEDICAL CENTER | Age: 37
End: 2017-05-12

## 2017-05-12 DIAGNOSIS — N92.1 METRORRHAGIA: ICD-10-CM

## 2017-05-12 NOTE — PROGRESS NOTES
Given other medicine.  Advised to go to the ER if pain not resolved with this medication or worsening pain, thank you, Dr. Ricardo

## 2017-05-12 NOTE — TELEPHONE ENCOUNTER
Patient called to inform us that the pain medication she was prescribed is not working, please advise. Thank you

## 2017-05-16 RX ORDER — LANOLIN ALCOHOL/MO/W.PET/CERES
CREAM (GRAM) TOPICAL
Qty: 90 TAB | Refills: 0 | Status: SHIPPED | OUTPATIENT
Start: 2017-05-16 | End: 2017-08-18 | Stop reason: SDUPTHER

## 2017-05-16 NOTE — TELEPHONE ENCOUNTER
Was the patient seen in the last year in this department? Yes     Does patient have an active prescription for medications requested? Yes     Received Request Via: Pharmacy    PT last visit 05/10/2017 Dr Nuñez   PT next visit 06/13/2017 Dr Nuñez

## 2017-07-14 ENCOUNTER — GYNECOLOGY VISIT (OUTPATIENT)
Dept: OBGYN | Facility: CLINIC | Age: 37
End: 2017-07-14
Payer: COMMERCIAL

## 2017-07-14 ENCOUNTER — HOSPITAL ENCOUNTER (OUTPATIENT)
Facility: MEDICAL CENTER | Age: 37
End: 2017-07-14
Attending: OBSTETRICS & GYNECOLOGY
Payer: COMMERCIAL

## 2017-07-14 VITALS
BODY MASS INDEX: 22.81 KG/M2 | HEIGHT: 69 IN | SYSTOLIC BLOOD PRESSURE: 118 MMHG | WEIGHT: 154 LBS | DIASTOLIC BLOOD PRESSURE: 78 MMHG

## 2017-07-14 DIAGNOSIS — Z01.419 WELL WOMAN EXAM: ICD-10-CM

## 2017-07-14 DIAGNOSIS — Z11.51 SPECIAL SCREENING EXAMINATION FOR HUMAN PAPILLOMAVIRUS (HPV): ICD-10-CM

## 2017-07-14 DIAGNOSIS — Z12.4 SCREENING FOR CERVICAL CANCER: ICD-10-CM

## 2017-07-14 DIAGNOSIS — N92.6 IRREGULAR MENSTRUAL BLEEDING: ICD-10-CM

## 2017-07-14 DIAGNOSIS — D25.0 SUBMUCOUS LEIOMYOMA OF UTERUS: ICD-10-CM

## 2017-07-14 DIAGNOSIS — N92.1 MENOMETRORRHAGIA: ICD-10-CM

## 2017-07-14 PROCEDURE — 99385 PREV VISIT NEW AGE 18-39: CPT | Performed by: OBSTETRICS & GYNECOLOGY

## 2017-07-14 PROCEDURE — 99214 OFFICE O/P EST MOD 30 MIN: CPT | Mod: 25 | Performed by: OBSTETRICS & GYNECOLOGY

## 2017-07-14 PROCEDURE — 87624 HPV HI-RISK TYP POOLED RSLT: CPT

## 2017-07-14 PROCEDURE — 88175 CYTOPATH C/V AUTO FLUID REDO: CPT

## 2017-07-14 NOTE — MR AVS SNAPSHOT
"        Jennie Morales   2017 11:15 AM   Gynecology Visit   MRN: [id number]    Department:  [facility]   Dept Phone:  [phone]    Description:  Female : 1980   Provider:  Ponce Guerin M.D.           Reason for Visit     New Patient           Allergies as of 2017     No Known Allergies      You were diagnosed with     Well woman exam   [869230]       Screening for cervical cancer   [164730]       Special screening examination for human papillomavirus (HPV)   [V73.81.ICD-9-CM]       Irregular menstrual bleeding   [475913]         Vital Signs     Blood Pressure Height Weight Body Mass Index Last Menstrual Period Breastfeeding?    118/78 mmHg 1.753 m (5' 9.02\") 69.854 kg (154 lb) 22.73 kg/m2 2017 No    Smoking Status                   Never Smoker            Basic Information     Date Of Birth Sex Race Ethnicity Preferred Language    1980 Female Black or  Non- English      Your appointments     Aug 10, 2017  8:15 AM   Adult Draw/Collection with MAIN [facility]   MAIN LAB [facility] (--)    [address]  [place] [address]   [phone]            Aug 18, 2017  7:00 AM   Established Patient with Savannah Rothman M.D.   [facility])    [address]  [address]  [place] [id number] [phone]           You will be receiving a confirmation call a few days before your appointment from our automated call confirmation system.            Aug 25, 2017  1:45 PM   GYN Visit with Ponce Guerin M.D.   [facility] ([address])    [address] [place] [address], [address]  [place] [address] [phone]              Problem List              ICD-10-CM Priority Class Noted - Resolved    Urinary incontinence R32   3/20/2017 - Present    Health care maintenance Z00.00   3/20/2017 - Present    Uterine leiomyoma D25.9   3/27/2017 - Present      Health Maintenance        Date Due Completion Dates   "    IMM DTaP/Tdap/Td Vaccine (1 - Tdap) 11/7/1998 ---    PAP SMEAR 11/7/2000 ---    IMM INFLUENZA (1) 9/1/2017 ---            Current Immunizations     Tuberculin Skin Test 2/14/2017 12:08 PM, 3/18/2015 12:40 PM      Below and/or attached are the medications your provider expects you to take. Review all of your home medications and newly ordered medications with your provider and/or pharmacist. Follow medication instructions as directed by your provider and/or pharmacist. Please keep your medication list with you and share with your provider. Update the information when medications are discontinued, doses are changed, or new medications (including over-the-counter products) are added; and carry medication information at all times in the event of emergency situations     Allergies:  No Known Allergies          Medications  Valid as of: July 14, 2017 - 12:01 PM    Generic Name Brand Name Tablet Size Instructions for use    Acetaminophen-Codeine (Tab) TYLENOL #3 300-30 MG Take 1-2 Tabs by mouth every four hours as needed.        Ferrous Sulfate (Tablet Delayed Response) ferrous sulfate 325 (65 FE) MG TAKE ONE TABLET BY MOUTH EVERY 12 HOURS        Mefenamic Acid (Cap) Mefenamic Acid 250 MG Initial: 500 mg, then 250 mg every 6 hours as needed; usually not to exceed 1 week        Naproxen (Tab) NAPROSYN 500 MG Start 1/2 tab 2-4 times aily, max dose 1000 mg per day        .                 Medicines prescribed today were sent to:     [facility] [address] [place] [address], [place] [address]    [address] [place] [address]    Phone: [phone] Fax: [phone]    Open 24 Hours?: No      Medication refill instructions:       If your prescription bottle indicates you have medication refills left, it is not necessary to call your provider’s office. Please contact your pharmacy and they will refill your medication.    If your prescription bottle indicates you do not have any refills left, you may request refills at  any time through one of the following ways: The online Shahiya system (except Urgent Care), by calling your provider’s office, or by asking your pharmacy to contact your provider’s office with a refill request. Medication refills are processed only during regular business hours and may not be available until the next business day. Your provider may request additional information or to have a follow-up visit with you prior to refilling your medication.   *Please Note: Medication refills are assigned a new Rx number when refilled electronically. Your pharmacy may indicate that no refills were authorized even though a new prescription for the same medication is available at the pharmacy. Please request the medicine by name with the pharmacy before contacting your provider for a refill.        Your To Do List     Future Labs/Procedures Complete By Expires    ESTRADIOL  As directed 7/14/2018    THINPREP PAP WITH HPV  As directed 7/14/2018         Shahiya Access Code: [email or web address]  Expires: 7/27/2017  4:08 AM    Shahiya  A secure, online tool to manage your health information     [employer]’s Shahiya® is a secure, online tool that connects you to your personalized health information from the privacy of your home -- day or night - making it very easy for you to manage your healthcare. Once the activation process is completed, you can even access your medical information using the Shahiya nadja, which is available for free in the Apple Nadja store or Google Play store.     Shahiya provides the following levels of access (as shown below):   My Chart Features   [facility] Primary [facility] Doctor [facility]  Specialists [facility]  Urgent  Care Non-[facility]  Primary Care  Doctor   Email your healthcare team securely and privately 24/7 X X X    Manage appointments: schedule your next appointment; view details of past/upcoming appointments X      Request prescription refills. X      View recent personal medical records, including lab and  immunizations X X X X   View health record, including health history, allergies, medications X X X X   Read reports about your outpatient visits, procedures, consult and ER notes X X X X   See your discharge summary, which is a recap of your hospital and/or ER visit that includes your diagnosis, lab results, and care plan. X X       How to register for Literably:  1. Go to  [email or web address].  2. Click on the Sign Up Now box, which takes you to the New Member Sign Up page. You will need to provide the following information:  a. Enter your Literably Access Code exactly as it appears at the top of this page. (You will not need to use this code after you’ve completed the sign-up process. If you do not sign up before the expiration date, you must request a new code.)   b. Enter your date of birth.   c. Enter your home email address.   d. Click Submit, and follow the next screen’s instructions.  3. Create a Literably ID. This will be your Literably login ID and cannot be changed, so think of one that is secure and easy to remember.  4. Create a Literably password. You can change your password at any time.  5. Enter your Password Reset Question and Answer. This can be used at a later time if you forget your password.   6. Enter your e-mail address. This allows you to receive e-mail notifications when new information is available in Literably.  7. Click Sign Up. You can now view your health information.    For assistance activating your Literably account, call [phone]

## 2017-07-14 NOTE — PROGRESS NOTES
"Jennie Morales 36 y.o.  female presents for Annual Well Woman Exam.    ROS: Patient is feeling well. No dyspnea or chest pain on exertion. No Abdominal pain, change in bowel habits, black or bloody stools. No urinary sx. GYN ROS:no breast pain or new or enlarging lumps on self exam, she complains of developing worsening cramps and heavy bleeding , over last 2 yrs . . Denies breast tenderness, mass, discharge, changes in size or contour, or abnormal cyclic discomfort. No neurological complaints.  Past Medical History   Diagnosis Date   • Urinary incontinence    • Anemia    • Hyperlipidemia       , 12 yrs ago   Allergy:   Review of patient's allergies indicates no known allergies.    LMP:       Patient's last menstrual period was 2017. . , bleeding for 10-12 days .   Past Surgical History   Procedure Laterality Date   • Gyn surgery       fibroids                 hysteroscopic resection ,  .     Menstrual History: menses irregular: spots in between menses  Contraceptive Method:none      Objective : The patient appears well, alert and oriented x 3, in no acute distress.  Blood pressure 118/78, height 1.753 m (5' 9.02\"), weight 69.854 kg (154 lb), last menstrual period 2017, not currently breastfeeding.  HEENT Exam: EOMI, CHARISSE, no adenopathy or thyromegaly.  Lungs: Clear to Auscultation and Percussion.  Cor: S1 and S2 normal, no murmurs, or rubs   Abdomen: Soft without tenderness, guarding mass or organomegaly.  Extremities: No edema, pulses equal  Neurological: Normal No focal signs  Breast Exam:Inspection negative. No nipple discharge or bleeding. No masses or nodularity palpable  Pelvic: External genitalia,urethral meatus, urethra, bladder and vagina normal. Cervix, uterus and adnexa intact, abnormal-uterus , 13-14 week size ,palpable fibroids  irregular uterus  mobile , + 1-2 tender , no adnexal masses .  Anus and perineum normal. Bimanual and rectovaginal without masses or " tenderness.    Lab:No results found for this or any previous visit (from the past 336 hour(s)).  U/S     3/21/2017 7:26 PM    HISTORY/REASON FOR EXAM:  Vaginal Bleeding  Heavy menses, history of fibroids    TECHNIQUE/EXAM DESCRIPTION:  Transvaginal pelvic ultrasound.    COMPARISON:   None    FINDINGS:  Both transabdominal and transvaginal scanning was performed to optimally visualize the pelvis.    The uterus measures 7.63 cm x 13.01 cm x 10.1 cm.  The uterine myometrium is inhomogeneous.  Multiple heterogeneous solid lesions present in the myometrium, measuring up to 4.8 cm.  The endometrial echo complex measures 1.23 cm. Distortion of the endometrial stripe in the lower uterine segment by dorsal fibroid measuring 3 cm.  Nabothian cysts present in the cervix.  Low resistive waveforms are confirmed within the ovaries.  The right ovary measures 2.43 cm x 1.73 cm x 3.31 cm. Hypoechoic structure measuring 2 x 1.7 x 1.7 cm.    The left ovary measures 2.50 cm x 1.23 cm x 2.78 cm.    Small amount of free fluid in the cul-de-sac.         Impression        1.  Multiple uterine fibroids.  2.  Distortion of the endometrial stripe in the lower uterine segment fibroid dorsal 3 cm submucosal fibroid.  3.  RIGHT ovarian cyst versus dominant follicle.  4.  No evidence for ovarian torsion.  5.  Small amount of free fluid, nonspecific.         Reading Provider Reading Date     Jefry Zuñiga M.D. Mar 21, 2017         Signing Provider Signing Date Signing Time     Jefry Zuñiga M.D. Mar 21, 2017  8:38 PM       Lab Information      Lab     RADIOLOGY                  Last Resulted Time     Tue Mar 21, 2017  8:41 PM       Detailed Information      Priority and Order Details        Collection Information      Resulting Agency: [facility]          Order-Level Documents - 03/21/2017:          Scan on 3/21/2017 8:34 PM by Sariah Whitecan on 3/21/2017 8:34 PM by Sariah Guzman       Order Detail Report      US-GYN-[facility] TRANSVAGINAL  (Order #[id number]) on 3/21/17       Assessment:  well woman  Menometrorrhagia  ( likely secondary to fibroids)   Recurrent Uterine fibroids : submucosal and intramural : multiple   Plan:pap smear  return annually or prn  Literature on fibroids   Extensive discussion regarding Myomectomy : trans-abdominal , and Subsequent pregnancy , if desired , vs TL  PAtient made aware of extensive surgery ad recovery needed to preserve uterus and dangers of subsequent pregnancy   Patient to decide . Aware that MRI, to map normal tissue needed before surgery   Need Progesterone , estradiol, ( day 24 today )   Contraception:none

## 2017-07-15 LAB
CYTOLOGY REG CYTOL: NORMAL
HPV HR 12 DNA CVX QL NAA+PROBE: NEGATIVE
HPV16 DNA SPEC QL NAA+PROBE: NEGATIVE
HPV18 DNA SPEC QL NAA+PROBE: NEGATIVE
SPECIMEN SOURCE: NORMAL

## 2017-08-10 ENCOUNTER — HOSPITAL ENCOUNTER (OUTPATIENT)
Dept: LAB | Facility: MEDICAL CENTER | Age: 37
End: 2017-08-10
Attending: INTERNAL MEDICINE
Payer: COMMERCIAL

## 2017-08-10 DIAGNOSIS — D50.0 BLOOD LOSS ANEMIA: ICD-10-CM

## 2017-08-10 DIAGNOSIS — N92.1 MENOMETRORRHAGIA: ICD-10-CM

## 2017-08-10 LAB
ANISOCYTOSIS BLD QL SMEAR: ABNORMAL
BASOPHILS # BLD AUTO: 0.9 % (ref 0–1.8)
BASOPHILS # BLD: 0.05 K/UL (ref 0–0.12)
EOSINOPHIL # BLD AUTO: 0 K/UL (ref 0–0.51)
EOSINOPHIL NFR BLD: 0 % (ref 0–6.9)
ERYTHROCYTE [DISTWIDTH] IN BLOOD BY AUTOMATED COUNT: 54.6 FL (ref 35.9–50)
HCT VFR BLD AUTO: 31.8 % (ref 37–47)
HGB BLD-MCNC: 9 G/DL (ref 12–16)
HYPOCHROMIA BLD QL SMEAR: ABNORMAL
LYMPHOCYTES # BLD AUTO: 1.97 K/UL (ref 1–4.8)
LYMPHOCYTES NFR BLD: 38.7 % (ref 22–41)
MACROCYTES BLD QL SMEAR: ABNORMAL
MANUAL DIFF BLD: NORMAL
MCH RBC QN AUTO: 20.5 PG (ref 27–33)
MCHC RBC AUTO-ENTMCNC: 28.3 G/DL (ref 33.6–35)
MCV RBC AUTO: 72.6 FL (ref 81.4–97.8)
MICROCYTES BLD QL SMEAR: ABNORMAL
MONOCYTES # BLD AUTO: 0.18 K/UL (ref 0–0.85)
MONOCYTES NFR BLD AUTO: 3.6 % (ref 0–13.4)
MORPHOLOGY BLD-IMP: NORMAL
NEUTROPHILS # BLD AUTO: 2.9 K/UL (ref 2–7.15)
NEUTROPHILS NFR BLD: 56.8 % (ref 44–72)
NRBC # BLD AUTO: 0 K/UL
NRBC BLD AUTO-RTO: 0 /100 WBC
OVALOCYTES BLD QL SMEAR: NORMAL
PLATELET # BLD AUTO: 407 K/UL (ref 164–446)
PLATELET BLD QL SMEAR: NORMAL
PMV BLD AUTO: 10 FL (ref 9–12.9)
POIKILOCYTOSIS BLD QL SMEAR: NORMAL
RBC # BLD AUTO: 4.38 M/UL (ref 4.2–5.4)
RBC BLD AUTO: PRESENT
SMUDGE CELLS BLD QL SMEAR: NORMAL
WBC # BLD AUTO: 5.1 K/UL (ref 4.8–10.8)

## 2017-08-10 PROCEDURE — 85007 BL SMEAR W/DIFF WBC COUNT: CPT

## 2017-08-10 PROCEDURE — 85027 COMPLETE CBC AUTOMATED: CPT

## 2017-08-10 PROCEDURE — 36415 COLL VENOUS BLD VENIPUNCTURE: CPT

## 2017-08-11 ENCOUNTER — TELEPHONE (OUTPATIENT)
Dept: MEDICAL GROUP | Facility: MEDICAL CENTER | Age: 37
End: 2017-08-11

## 2017-08-11 NOTE — TELEPHONE ENCOUNTER
----- Message from Savannah Rothman M.D. sent at 8/11/2017  6:35 AM PDT -----  Please, notify the patient that labs are reviewed, and will be discussed at OV. Continue iron supplement, thanks, Dr Ricardo

## 2017-08-11 NOTE — TELEPHONE ENCOUNTER
Phone Number Called: [phone] (home)     Message: Patient informed of results.     Left Message for patient to call back: yes

## 2017-08-18 ENCOUNTER — HOSPITAL ENCOUNTER (OUTPATIENT)
Dept: LAB | Facility: MEDICAL CENTER | Age: 37
End: 2017-08-18
Attending: INTERNAL MEDICINE
Payer: COMMERCIAL

## 2017-08-18 ENCOUNTER — OFFICE VISIT (OUTPATIENT)
Dept: MEDICAL GROUP | Facility: MEDICAL CENTER | Age: 37
End: 2017-08-18
Payer: COMMERCIAL

## 2017-08-18 VITALS
DIASTOLIC BLOOD PRESSURE: 76 MMHG | HEART RATE: 70 BPM | HEIGHT: 69 IN | OXYGEN SATURATION: 98 % | SYSTOLIC BLOOD PRESSURE: 122 MMHG | WEIGHT: 158 LBS | TEMPERATURE: 98.1 F | BODY MASS INDEX: 23.4 KG/M2

## 2017-08-18 DIAGNOSIS — D50.0 IRON DEFICIENCY ANEMIA DUE TO CHRONIC BLOOD LOSS: ICD-10-CM

## 2017-08-18 DIAGNOSIS — Z01.89 PATIENT REQUESTED DIAGNOSTIC TESTING: ICD-10-CM

## 2017-08-18 DIAGNOSIS — N92.1 MENOMETRORRHAGIA: ICD-10-CM

## 2017-08-18 DIAGNOSIS — Z00.00 HEALTH CARE MAINTENANCE: ICD-10-CM

## 2017-08-18 DIAGNOSIS — D25.9 UTERINE LEIOMYOMA, UNSPECIFIED LOCATION: ICD-10-CM

## 2017-08-18 LAB
ANISOCYTOSIS BLD QL SMEAR: ABNORMAL
BASOPHILS # BLD AUTO: 0.5 % (ref 0–1.8)
BASOPHILS # BLD: 0.03 K/UL (ref 0–0.12)
COMMENT 1642: NORMAL
EOSINOPHIL # BLD AUTO: 0.03 K/UL (ref 0–0.51)
EOSINOPHIL NFR BLD: 0.5 % (ref 0–6.9)
ERYTHROCYTE [DISTWIDTH] IN BLOOD BY AUTOMATED COUNT: 55 FL (ref 35.9–50)
HCT VFR BLD AUTO: 33 % (ref 37–47)
HGB BLD-MCNC: 9.3 G/DL (ref 12–16)
IMM GRANULOCYTES # BLD AUTO: 0.01 K/UL (ref 0–0.11)
IMM GRANULOCYTES NFR BLD AUTO: 0.2 % (ref 0–0.9)
LYMPHOCYTES # BLD AUTO: 2.01 K/UL (ref 1–4.8)
LYMPHOCYTES NFR BLD: 33.2 % (ref 22–41)
MACROCYTES BLD QL SMEAR: ABNORMAL
MCH RBC QN AUTO: 20.9 PG (ref 27–33)
MCHC RBC AUTO-ENTMCNC: 28.2 G/DL (ref 33.6–35)
MCV RBC AUTO: 74 FL (ref 81.4–97.8)
MEV IGG SER IA-ACNC: NEGATIVE
MONOCYTES # BLD AUTO: 0.31 K/UL (ref 0–0.85)
MONOCYTES NFR BLD AUTO: 5.1 % (ref 0–13.4)
MORPHOLOGY BLD-IMP: NORMAL
MUV IGG SER IA-ACNC: POSITIVE
NEUTROPHILS # BLD AUTO: 3.66 K/UL (ref 2–7.15)
NEUTROPHILS NFR BLD: 60.5 % (ref 44–72)
NRBC # BLD AUTO: 0 K/UL
NRBC BLD AUTO-RTO: 0 /100 WBC
OVALOCYTES BLD QL SMEAR: NORMAL
PLATELET # BLD AUTO: 371 K/UL (ref 164–446)
PLATELET BLD QL SMEAR: NORMAL
PMV BLD AUTO: 10.4 FL (ref 9–12.9)
POIKILOCYTOSIS BLD QL SMEAR: NORMAL
RBC # BLD AUTO: 4.46 M/UL (ref 4.2–5.4)
RBC BLD AUTO: PRESENT
RUBV AB SER QL: 101.6 IU/ML
VZV IGG SER IA-ACNC: POSITIVE
WBC # BLD AUTO: 6.1 K/UL (ref 4.8–10.8)

## 2017-08-18 PROCEDURE — 86787 VARICELLA-ZOSTER ANTIBODY: CPT

## 2017-08-18 PROCEDURE — 86735 MUMPS ANTIBODY: CPT

## 2017-08-18 PROCEDURE — 85025 COMPLETE CBC W/AUTO DIFF WBC: CPT

## 2017-08-18 PROCEDURE — 86765 RUBEOLA ANTIBODY: CPT

## 2017-08-18 PROCEDURE — 86762 RUBELLA ANTIBODY: CPT

## 2017-08-18 PROCEDURE — 86480 TB TEST CELL IMMUN MEASURE: CPT

## 2017-08-18 PROCEDURE — 99214 OFFICE O/P EST MOD 30 MIN: CPT | Performed by: INTERNAL MEDICINE

## 2017-08-18 PROCEDURE — 36415 COLL VENOUS BLD VENIPUNCTURE: CPT

## 2017-08-18 RX ORDER — LANOLIN ALCOHOL/MO/W.PET/CERES
325 CREAM (GRAM) TOPICAL EVERY 12 HOURS
Qty: 90 TAB | Refills: 1 | Status: SHIPPED | OUTPATIENT
Start: 2017-08-18 | End: 2018-12-01 | Stop reason: SDUPTHER

## 2017-08-18 NOTE — PROGRESS NOTES
CHIEF COMPLAINT  Chief Complaint   Patient presents with   • Follow-Up     on labs    Anemia    HPI  Patient is a 36 y.o. female patient who presents today for the following     Uterine fibroids, Menometrorrhagia, blood loss anemia  Interval course:   - periods: remained heavy, but slightly improved  - GYN visit, 7/14/17: obtained PAP; will need hysterectomy at some point  - treatment: fe-sulfate, 325 mg BID   - improved CBC, no dizziness    Background:   The patient has history of fibroids and menometrorrhagia: heavy, irregular periods.    It was complicated with blood loss anemia in 3/2017,                            - lowest Hgb of 6.1, requiring PRBC x 1;                          - The last Hgb was 9.0, previous 7.3     LMP: 7/24/17.  The pain is severe, crampy:              - menstrual pain grade score is 3, lasting 7 d              - flow is high/severe, no blood clots                - TSH was normal in 3/2017.    Meds used: Ibuprofen, naproxen, tylenol #3 occasionally              - Did not help   Vaginal d/c was normal.    No h/o or risk for STDs.     Reviewed PMH, PSH, FH, SH, ALL, HCM/IMM, no changes  Reviewed MEDS, no changes    Patient Active Problem List    Diagnosis Date Noted   • Menometrorrhagia 07/14/2017   • Uterine leiomyoma 03/27/2017   • Urinary incontinence 03/20/2017   • Health care maintenance 03/20/2017     CURRENT MEDICATIONS  Current Outpatient Prescriptions   Medication Sig Dispense Refill   • ferrous sulfate 325 (65 Fe) MG EC tablet Take 1 Tab by mouth every 12 hours. 90 Tab 1   • acetaminophen-codeine #3 (TYLENOL #3) 300-30 MG Tab Take 1-2 Tabs by mouth every four hours as needed. 15 Tab 0   • naproxen (NAPROSYN) 500 MG Tab Start 1/2 tab 2-4 times aily, max dose 1000 mg per day 60 Tab 1   • Mefenamic Acid 250 MG Cap Initial: 500 mg, then 250 mg every 6 hours as needed; usually not to exceed 1 week 30 Cap 1     No current facility-administered medications for this visit.  "    ALLERGIES  Allergies: Review of patient's allergies indicates no known allergies.    PAST MEDICAL HISTORY  Past Medical History   Diagnosis Date   • Urinary incontinence    • Anemia    • Hyperlipidemia    • Fibroids      SURGICAL HISTORY  She  has past surgical history that includes gyn surgery.    SOCIAL HISTORY  Social History   Substance Use Topics   • Smoking status: Never Smoker    • Smokeless tobacco: Never Used   • Alcohol Use: 0.0 oz/week     0 Standard drinks or equivalent per week      Comment: occ     Social History     Social History Narrative     FAMILY HISTORY  Family History   Problem Relation Age of Onset   • Cancer Sister      cervical cance   • Cancer Maternal Grandmother    • Diabetes Father    • Hyperlipidemia Father    • Heart Disease Neg Hx    • Hypertension Neg Hx    • Thyroid Neg Hx    • Hyperlipidemia Mother    • Psychiatry Mother    • Hyperlipidemia Sister    • Psychiatry Sister      Family Status   Relation Status Death Age   • Father Alive    • Mother Alive      ROS   Constitutional: Negative for fever, chills.  HENT: Negative for congestion, sore throat.  Eyes: Negative for blurred vision.   Respiratory: Negative for cough, shortness of breath.  Cardiovascular: Negative for chest pain, palpitations.   Gastrointestinal: Negative for heartburn, nausea, abdominal pain.   Genitourinary: Negative for dysuria.  Musculoskeletal: Negative for significant myalgias, back pain and joint pain.   Skin: Negative for rash and itching.   Neuro: Negative for dizziness, weakness and headaches.   Endo/Heme/Allergies: Does not bruise/bleed easily. And per HPI.  Psychiatric/Behavioral: Negative for depression, anxiety    PHYSICAL EXAM   Blood pressure 122/76, pulse 70, temperature 36.7 °C (98.1 °F), height 1.753 m (5' 9.02\"), weight 71.668 kg (158 lb), SpO2 98 %. Body mass index is 23.32 kg/(m^2).  General:  NAD, well appearing  HEENT:   NC/AT, PERRLA, EOMI, TMs are clear. Oropharyngeal mucosa is pink,  " without lesions;  no cervical / supraclavicular  lymphadenopathy, no thyromegaly.    Cardiovascular: RRR.   No m/r/g. No carotid bruits .      Lungs:   CTAB, no w/r/r, no respiratory distress.  Abdomen: Soft, NT/ND + BS; no suprapubic tenderness; no masses or hepatosplenomegaly.  Extremities:  2+ DP and radial pulses bilaterally.  No c/c/e.   Skin:  Warm, dry.  No erythema. No rash.   Neurologic: Alert & oriented x 3. No focal deficits.  Psychiatric:  Affect normal, mood normal, judgment normal.    LABS     Labs are reviewed and discussed with a patient    Lab Results   Component Value Date/Time    CHOLESTEROL,* 03/20/2017 08:26 AM    * 03/20/2017 08:26 AM    HDL 78 03/20/2017 08:26 AM    TRIGLYCERIDES 66 03/20/2017 08:26 AM       Lab Results   Component Value Date/Time    SODIUM 136 03/21/2017 06:45 PM    POTASSIUM 3.7 03/21/2017 06:45 PM    CHLORIDE 107 03/21/2017 06:45 PM    CO2 22 03/21/2017 06:45 PM    GLUCOSE 98 03/21/2017 06:45 PM    BUN 11 03/21/2017 06:45 PM    CREATININE 0.75 03/21/2017 06:45 PM     Lab Results   Component Value Date/Time    ALKALINE PHOSPHATASE 42 03/20/2017 08:26 AM    AST(SGOT) 26 03/20/2017 08:26 AM    ALT(SGPT) 11 03/20/2017 08:26 AM    TOTAL BILIRUBIN 0.6 03/20/2017 08:26 AM       Ref. Range 3/21/2017 18:45 3/27/2017 08:05 8/10/2017 08:40   RBC Latest Ref Range: 4.20-5.40 M/uL 3.43 (L) 3.71 (L) 4.38   Hemoglobin Latest Ref Range: 12.0-16.0 g/dL 6.1 (L) 7.3 (L) 9.0 (L)   Hematocrit Latest Ref Range: 37.0-47.0 % 22.8 (L) 25.8 (L) 31.8 (L)   MCV Latest Ref Range: 81.4-97.8 fL 66.5 (L) 69.5 (L) 72.6 (L)   MCH Latest Ref Range: 27.0-33.0 pg 17.8 (L) 19.7 (L) 20.5 (L)   MCHC Latest Ref Range: 33.6-35.0 g/dL 26.8 (L) 28.3 (L) 28.3 (L)   RDW Latest Ref Range: 35.9-50.0 fL 50.6 (H) 60.0 (H) 54.6 (H)       IMAGING     None    ASSESMENT AND PLAN        1. Uterine leiomyoma, unspecified location  2. Menometrorrhagia  - CBC WITH DIFFERENTIAL; Future  3. Iron deficiency anemia due to  chronic blood loss  Improved CBC, continue current treatment (refilled),  and GYN follow-up  - CBC WITH DIFFERENTIAL; Future    4. Health care maintenance  UTD    5. Patient requested diagnostic testing  For school:  - TB TEST CELL IMM MEASURE AG (QUANTIFERON); Future  - MEASLES/MUMPS/RUBELLA IMMUNITY  - VARICELLA ZOSTER IGG AB; Future    Counseling:   - Smoking:  Nonsmoker    Followup: Return in about 3 months (around 11/18/2017) for Short.    All questions are answered.    Please note that this dictation was created using voice recognition software, and that there might be errors of rai and possibly content.

## 2017-08-21 LAB
M TB TUBERC IFN-G BLD QL: POSITIVE
M TB TUBERC IFN-G/MITOGEN IGNF BLD: 0.4
M TB TUBERC IGNF/MITOGEN IGNF CONTROL: 4.72 [IU]/ML
MITOGEN IGNF BCKGRD COR BLD-ACNC: 0.02 [IU]/ML

## 2017-08-23 ENCOUNTER — TELEPHONE (OUTPATIENT)
Dept: MEDICAL GROUP | Facility: MEDICAL CENTER | Age: 37
End: 2017-08-23

## 2017-08-23 DIAGNOSIS — R76.12 POSITIVE QUANTIFERON-TB GOLD TEST: ICD-10-CM

## 2017-08-23 NOTE — TELEPHONE ENCOUNTER
Please, notify patient that TB test came back positive:  - I ordered chest x-ray and referral to ID.   Thank you, Dr. Ricardo

## 2017-08-24 ENCOUNTER — HOSPITAL ENCOUNTER (OUTPATIENT)
Dept: RADIOLOGY | Facility: MEDICAL CENTER | Age: 37
End: 2017-08-24
Attending: INTERNAL MEDICINE
Payer: COMMERCIAL

## 2017-08-24 DIAGNOSIS — R76.12 POSITIVE QUANTIFERON-TB GOLD TEST: ICD-10-CM

## 2017-08-24 PROCEDURE — 71020 DX-CHEST-2 VIEWS: CPT

## 2017-09-12 ENCOUNTER — TELEPHONE (OUTPATIENT)
Dept: MEDICAL GROUP | Facility: MEDICAL CENTER | Age: 37
End: 2017-09-12

## 2017-09-12 NOTE — TELEPHONE ENCOUNTER
Phone Number Called: [phone] (home)     Message: Patient called requesting last ov visit and informed me that she feel she does not need to go to infectious disease because [place] tb. I informed patient that  advises that she still go. Patient agreed and will go. Patient would like other lab results. I informed her I am waiting for them to be resulted.     Left Message for patient to call back: yes

## 2017-10-12 ENCOUNTER — OFFICE VISIT (OUTPATIENT)
Dept: INFECTIOUS DISEASES | Facility: MEDICAL CENTER | Age: 37
End: 2017-10-12
Payer: COMMERCIAL

## 2017-10-12 VITALS
OXYGEN SATURATION: 100 % | SYSTOLIC BLOOD PRESSURE: 110 MMHG | WEIGHT: 167.6 LBS | TEMPERATURE: 97.8 F | HEIGHT: 69 IN | BODY MASS INDEX: 24.82 KG/M2 | HEART RATE: 102 BPM | DIASTOLIC BLOOD PRESSURE: 66 MMHG

## 2017-10-12 DIAGNOSIS — Z22.7 TB LUNG, LATENT: ICD-10-CM

## 2017-10-12 PROCEDURE — 99212 OFFICE O/P EST SF 10 MIN: CPT | Performed by: INTERNAL MEDICINE

## 2017-10-12 RX ORDER — PYRIDOXINE HCL (VITAMIN B6) 50 MG
50 TABLET ORAL DAILY
Qty: 30 TAB | Refills: 11 | Status: SHIPPED | OUTPATIENT
Start: 2017-10-12 | End: 2022-03-16

## 2017-10-12 RX ORDER — ISONIAZID 300 MG/1
300 TABLET ORAL DAILY
Qty: 90 TAB | Refills: 1 | Status: SHIPPED | OUTPATIENT
Start: 2017-10-12 | End: 2018-04-10

## 2017-10-12 NOTE — PROGRESS NOTES
DATE OF SERVICE:  10/12/2017     REFERRING PHYSICIAN:  Savannah Rothman M.D.     REASON FOR CONSULTATION: Positive QuantiFERON     HISTORY OF PRESENT ILLNESS:  Patient is a 36 y.o. female who has a past medical history of fibroids was found to have a positive QuantiFERON during a routine examination for school. Her chest x-ray was negative. She was sent to us for treatment of her latent TB. Patient  denies any homelessness, incarceration. But was born in [facility]. [place] been [place] 12.       REVIEW OF SYSTEMS:    Constitutional: Negative for fever and malaise/fatigue. denies night sweats  HENT: Negative for hearing loss and visual changes   Eyes: Negative for blurred vision, double vision and photophobia.   Respiratory: Negative for cough. Denies any sputum or hemoptysis  Cardiovascular: Negative for chest pain and leg swelling.   Gastrointestinal: Negative for nausea, vomiting and diarrhea.   Musculoskeletal: Negative for myalgias and back pain.   Skin: Negative for rash.   Neurological: Negative for sensory change, focal weakness and headaches.     ALLERGIES:  No Known Allergies     PAST MEDICAL HISTORY:   Past Medical History:   Diagnosis Date   • Anemia    • Fibroids    • Hyperlipidemia    • Urinary incontinence        PAST SURGICAL HISTORY:    Past Surgical History:   Procedure Laterality Date   • GYN SURGERY      fibroids       FAMILY HISTORY:    Family History   Problem Relation Age of Onset   • Cancer Sister      cervical cance   • Cancer Maternal Grandmother    • Diabetes Father    • Hyperlipidemia Father    • Heart Disease Neg Hx    • Hypertension Neg Hx    • Thyroid Neg Hx    • Hyperlipidemia Mother    • Psychiatry Mother    • Hyperlipidemia Sister    • Psychiatry Sister         SOCIAL HISTORY:    Social History     Social History   • Marital status:      Spouse name: N/A   • Number of children: N/A   • Years of education: N/A     Occupational History   • Not on file.     Social History  "Main Topics   • Smoking status: Never Smoker   • Smokeless tobacco: Never Used   • Alcohol use 0.0 oz/week      Comment: occ   • Drug use: No   • Sexual activity: Yes     Partners: Male      Comment: none      Other Topics Concern   • Not on file     Social History Narrative   • No narrative on file        MEDICATIONS:   Current Outpatient Prescriptions   Medication Sig Dispense Refill   • ferrous sulfate 325 (65 Fe) MG EC tablet Take 1 Tab by mouth every 12 hours. 90 Tab 1   • acetaminophen-codeine #3 (TYLENOL #3) 300-30 MG Tab Take 1-2 Tabs by mouth every four hours as needed. 15 Tab 0   • naproxen (NAPROSYN) 500 MG Tab Start 1/2 tab 2-4 times aily, max dose 1000 mg per day 60 Tab 1   • Mefenamic Acid 250 MG Cap Initial: 500 mg, then 250 mg every 6 hours as needed; usually not to exceed 1 week 30 Cap 1     No current facility-administered medications for this visit.         LABORATORY DATA:  quantiferon pos     PHYSICAL EXAMINATION:PE:      /66   Pulse (!) 102   Temp 36.6 °C (97.8 °F)   Ht 1.753 m (5' 9.02\")   Wt 76 kg (167 lb 9.6 oz)   SpO2 100%   BMI 24.74 kg/m²        Constitutional: patient is oriented to person, place, and time. He appears well-developed and well-nourished. No distress  Eyes: Conjunctivae normal and EOM are normal. Pupils are equal, round, and reactive to light.   Mouth/Throat: Lips without lesions, good dentition, oropharynx is clear and moist.  Neck: Trachea midline. Normal range of motion. Neck supple. No masses  Cardiovascular: Normal rate, regular rhythm, normal heart sounds and intact distal pulses. No murmur, gallop, or friction rub. No edema.  Pulmonary/Chest: No respiratory distress. Unlabored respiratory effort, lungs clear to auscultation. No wheezes or rales.   Abdominal: Soft, non tender. BS + x 4. No masses or hepatosplenomegaly.   Musculoskeletal: Normal range of motion. No tenderness, swelling, erythema, deformity noted.  Neurological: He is alert and oriented to " person, place, and time. No cranial nerve deficit. Coordination normal.   Skin: Skin is warm and dry. Good turgor. No rashes visable.  Psychiatric: He has a normal mood and affect. His behavior is normal.        ASSESSMENT:  1. TB lung, latent          RECOMMENDATIONS:      Patient will be started on INH and vit b6. To be taken for 9 months.   Advised about side effects. Check lfts once a month. Last lfts were normal in march. Standing order written  She was advised about drug interactions with Tylenol and other hepatotoxic medications and alcohol. Patient does not take any medications regularly  We will see her back in 6 months

## 2018-03-12 ENCOUNTER — NON-PROVIDER VISIT (OUTPATIENT)
Dept: URGENT CARE | Facility: CLINIC | Age: 38
End: 2018-03-12

## 2018-03-12 DIAGNOSIS — Z11.1 PPD SCREENING TEST: ICD-10-CM

## 2018-03-12 PROCEDURE — 86580 TB INTRADERMAL TEST: CPT | Performed by: PHYSICIAN ASSISTANT

## 2018-03-15 ENCOUNTER — NON-PROVIDER VISIT (OUTPATIENT)
Dept: URGENT CARE | Facility: CLINIC | Age: 38
End: 2018-03-15

## 2018-03-15 LAB — TB WHEAL 3D P 5 TU DIAM: NORMAL MM

## 2018-08-03 ENCOUNTER — APPOINTMENT (OUTPATIENT)
Dept: RADIOLOGY | Facility: IMAGING CENTER | Age: 38
End: 2018-08-03
Attending: FAMILY MEDICINE
Payer: COMMERCIAL

## 2018-08-03 ENCOUNTER — OFFICE VISIT (OUTPATIENT)
Dept: URGENT CARE | Facility: CLINIC | Age: 38
End: 2018-08-03
Payer: COMMERCIAL

## 2018-08-03 VITALS
RESPIRATION RATE: 16 BRPM | HEIGHT: 69 IN | OXYGEN SATURATION: 100 % | WEIGHT: 176.8 LBS | SYSTOLIC BLOOD PRESSURE: 126 MMHG | BODY MASS INDEX: 26.19 KG/M2 | HEART RATE: 84 BPM | DIASTOLIC BLOOD PRESSURE: 76 MMHG | TEMPERATURE: 98.6 F

## 2018-08-03 DIAGNOSIS — S99.921A INJURY OF RIGHT FOOT, INITIAL ENCOUNTER: ICD-10-CM

## 2018-08-03 DIAGNOSIS — S92.354A NONDISPLACED FRACTURE OF FIFTH METATARSAL BONE, RIGHT FOOT, INITIAL ENCOUNTER FOR CLOSED FRACTURE: ICD-10-CM

## 2018-08-03 PROCEDURE — 73630 X-RAY EXAM OF FOOT: CPT | Mod: TC,FY,RT | Performed by: FAMILY MEDICINE

## 2018-08-03 PROCEDURE — 99204 OFFICE O/P NEW MOD 45 MIN: CPT | Performed by: FAMILY MEDICINE

## 2018-08-03 ASSESSMENT — ENCOUNTER SYMPTOMS
BRUISES/BLEEDS EASILY: 0
SENSORY CHANGE: 0
HEADACHES: 0
DIZZINESS: 0
CHILLS: 0
FEVER: 0
FALLS: 1

## 2018-08-03 ASSESSMENT — PAIN SCALES - GENERAL: PAINLEVEL: 8=MODERATE-SEVERE PAIN

## 2018-08-03 NOTE — LETTER
August 9, 2018         Patient: Jennie Morales   YOB: 1980   Date of Visit: 8/3/2018           To Whom it May Concern:    Jennie Morales was seen in my clinic on 8/3/2018. She was diagnosed with a closed fifth metatarsal fracture of the right foot. 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 She should avoid opioid pain medication usage while driving.     If you have any questions or concerns, please don't hesitate to call.        Sincerely,           Vanna Banda D.O.  Electronically Signed

## 2018-08-03 NOTE — LETTER
August 3, 2018         Patient: Jennie Morales   YOB: 1980   Date of Visit: 8/3/2018           To Whom it May Concern:    Jennie Morales was seen in my clinic on 8/3/2018. Patient is under care for a right foot fracture and should avoid bearing weight on this extremity for more than 15% of her work hours this includes standing, walking and climbing.     If you have any questions or concerns, please don't hesitate to call.        Sincerely,           Vanna Banda D.O.  Electronically Signed

## 2018-08-03 NOTE — LETTER
August 3, 2018         Patient: Jennie Morales   YOB: 1980   Date of Visit: 8/3/2018           To Whom it May Concern:    Jennie Morales was seen in my clinic on 8/3/2018. Patient is under care for a right foot fracture and should avoid bearing weight on this extremity for prolonged periods of time.    If you have any questions or concerns, please don't hesitate to call.        Sincerely,           Vanna Banda D.O.  Electronically Signed

## 2018-08-03 NOTE — PROGRESS NOTES
Subjective:      Jennie Morales is a 37 y.o. female who presents with Fall (slipped last night, Foot R side )    Patient presents to urgent care with acute onset of a new problem.  Last night she fell from her back care patio twisted her right foot has had swelling and pain on the lateral aspect ever since.  No numbness tingling or weakness.  She was able to sleep with taking Tylenol 3 last night.  Denies previous injury to this foot.  No head injury from fall.      HPI  Review of Systems   Constitutional: Negative for chills and fever.   Musculoskeletal: Positive for falls and joint pain.   Skin: Negative for itching and rash.   Neurological: Negative for dizziness, sensory change and headaches.   Endo/Heme/Allergies: Does not bruise/bleed easily.   All other systems reviewed and are negative.    PMH:  has a past medical history of Anemia; Fibroids; Hyperlipidemia; and Urinary incontinence.  MEDS:   Current Outpatient Prescriptions:   •  pyridoxine (VITAMIN B-6) 50 MG Tab, Take 1 Tab by mouth every day., Disp: 30 Tab, Rfl: 11  •  ferrous sulfate 325 (65 Fe) MG EC tablet, Take 1 Tab by mouth every 12 hours., Disp: 90 Tab, Rfl: 1  •  acetaminophen-codeine #3 (TYLENOL #3) 300-30 MG Tab, Take 1-2 Tabs by mouth every four hours as needed., Disp: 15 Tab, Rfl: 0  •  naproxen (NAPROSYN) 500 MG Tab, Start 1/2 tab 2-4 times aily, max dose 1000 mg per day, Disp: 60 Tab, Rfl: 1  •  Mefenamic Acid 250 MG Cap, Initial: 500 mg, then 250 mg every 6 hours as needed; usually not to exceed 1 week, Disp: 30 Cap, Rfl: 1  ALLERGIES: No Known Allergies  SURGHX:   Past Surgical History:   Procedure Laterality Date   • GYN SURGERY      fibroids   SOCHX:  reports that she has never smoked. She has never used smokeless tobacco. She reports that she drinks alcohol. She reports that she does not use drugs.  FH: Family history was reviewed, no pertinent findings to report   Objective:     /76   Pulse 84   Temp 37 °C (98.6 °F)    "Resp 16   Ht 1.753 m (5' 9\")   Wt 80.2 kg (176 lb 12.8 oz)   SpO2 100%   Breastfeeding? No   BMI 26.11 kg/m²      Physical Exam   Constitutional: She is oriented to person, place, and time. She appears well-developed and well-nourished. No distress.   HENT:   Mouth/Throat: Oropharynx is clear and moist.   Eyes: Conjunctivae are normal.   Neck: Normal range of motion.   Cardiovascular: Normal rate.    Pulmonary/Chest: Effort normal.   Abdominal: Soft.   Musculoskeletal: Normal range of motion. She exhibits edema and tenderness. She exhibits no deformity.        Right foot: There is tenderness, bony tenderness and swelling. There is normal range of motion.        Feet:    Lymphadenopathy:     She has no cervical adenopathy.   Neurological: She is alert and oriented to person, place, and time. Coordination abnormal.   Skin: Skin is warm and dry. No rash noted. She is not diaphoretic. No erythema. No pallor.   Psychiatric: She has a normal mood and affect. Her behavior is normal. Judgment and thought content normal.     Diagnostics: X-ray per my review there is a 5th metatarsal fracture present along with sts       Assessment/Plan:     1. Injury of right foot, initial encounter  DX-FOOT-COMPLETE 3+ RIGHT   2. Nondisplaced fracture of fifth metatarsal bone, left foot, initial encounter for closed fracture  REFERRAL TO SPORTS MEDICINE    acetaminophen-codeine #3 (TYLENOL #3) 300-30 MG Tab    CONSENT FOR OPIATE PRESCRIPTION     Differential diagnosis, natural history, supportive care discussed. Follow-up with Dr. Rodriguez sports med provider within 7-10 days, emergency room precautions discussed.  Patient and/or family appears understanding of information. Tylenol 3 prn, ibuprofen prn pain and swelling elevate and ice    Placed in a boot  "

## 2018-08-10 ENCOUNTER — TELEPHONE (OUTPATIENT)
Dept: URGENT CARE | Facility: CLINIC | Age: 38
End: 2018-08-10

## 2018-08-10 NOTE — TELEPHONE ENCOUNTER
----- Message from [facility] sent at 8/9/2018  8:17 AM PDT -----  Regarding: FW: Procedure Question  Contact: [phone]      ----- Message -----  From: Jennie Morales  Sent: 8/9/2018   8:12 AM  To: [facility]  Message Pool  Subject: Procedure Question                               Dariel Banda,    I need a revised note from you in order to return to work. My supervisor is asking me to provide a note from you stating that I am permitted to drive.     Thank you for your assistance.     Jennie Morales, [birth year]

## 2018-08-13 ENCOUNTER — OFFICE VISIT (OUTPATIENT)
Dept: MEDICAL GROUP | Facility: CLINIC | Age: 38
End: 2018-08-13
Payer: COMMERCIAL

## 2018-08-13 ENCOUNTER — APPOINTMENT (OUTPATIENT)
Dept: RADIOLOGY | Facility: IMAGING CENTER | Age: 38
End: 2018-08-13
Attending: FAMILY MEDICINE
Payer: COMMERCIAL

## 2018-08-13 VITALS
BODY MASS INDEX: 26.19 KG/M2 | HEIGHT: 69 IN | HEART RATE: 86 BPM | RESPIRATION RATE: 18 BRPM | DIASTOLIC BLOOD PRESSURE: 76 MMHG | WEIGHT: 176.8 LBS | TEMPERATURE: 98.6 F | SYSTOLIC BLOOD PRESSURE: 118 MMHG | OXYGEN SATURATION: 99 %

## 2018-08-13 DIAGNOSIS — S99.191A FRACTURE OF BASE OF FIFTH METATARSAL BONE OF RIGHT FOOT AT METAPHYSEAL-DIAPHYSEAL JUNCTION, CLOSED, INITIAL ENCOUNTER: ICD-10-CM

## 2018-08-13 PROCEDURE — 73630 X-RAY EXAM OF FOOT: CPT | Mod: TC,RT | Performed by: FAMILY MEDICINE

## 2018-08-13 PROCEDURE — 28470 CLTX METATARSAL FX WO MNP EA: CPT | Mod: RT | Performed by: FAMILY MEDICINE

## 2018-08-13 ASSESSMENT — ENCOUNTER SYMPTOMS
VOMITING: 0
DIZZINESS: 0
NAUSEA: 0
SHORTNESS OF BREATH: 0
CHILLS: 0
FEVER: 0

## 2018-08-13 NOTE — PROGRESS NOTES
Subjective:      Jennie Morales is a 37 y.o. female who presents with Foot Problem (Referral from UC/ R foot injury )     Referred by Vanna Banda D.O. for evaluation of RIGHT foot pain    HPI   RIGHT foot pain  Date of injury Thursday, August 2, 2018  Mechanism of injury, slipped off of her patio, does not recall exact mechanism of injury  May have been inversion injury  Did not feel a pop at the time of the injury  Sudden sharp pain at the RIGHT lateral hindfoot  Improved with use of Cam walker walker boot/immobilization  Also using turning leg  for long distances  Worse with excessive weightbearing  No radiation  Currently her pain is improved  Currently taking Tylenol with codeine for pain, 2 times per day  No night symptoms  Denies any prior issues with the RIGHT foot    Review of Systems   Constitutional: Negative for chills and fever.   Respiratory: Negative for shortness of breath.    Cardiovascular: Negative for chest pain.   Gastrointestinal: Negative for nausea and vomiting.   Neurological: Negative for dizziness.     PMH:  has a past medical history of Anemia; Fibroids; Hyperlipidemia; and Urinary incontinence.  MEDS:   Current Outpatient Prescriptions:   •  pyridoxine (VITAMIN B-6) 50 MG Tab, Take 1 Tab by mouth every day., Disp: 30 Tab, Rfl: 11  •  ferrous sulfate 325 (65 Fe) MG EC tablet, Take 1 Tab by mouth every 12 hours., Disp: 90 Tab, Rfl: 1  •  naproxen (NAPROSYN) 500 MG Tab, Start 1/2 tab 2-4 times aily, max dose 1000 mg per day, Disp: 60 Tab, Rfl: 1  •  Mefenamic Acid 250 MG Cap, Initial: 500 mg, then 250 mg every 6 hours as needed; usually not to exceed 1 week, Disp: 30 Cap, Rfl: 1  ALLERGIES: No Known Allergies  SURGHX:   Past Surgical History:   Procedure Laterality Date   • GYN SURGERY      fibroids     SOCHX:  reports that she has never smoked. She has never used smokeless tobacco. She reports that she drinks alcohol. She reports that she does not use drugs.  FH: Family  "history was reviewed, no pertinent findings to report     Objective:     /76   Pulse 86   Temp 37 °C (98.6 °F)   Resp 18   Ht 1.753 m (5' 9\")   Wt 80.2 kg (176 lb 12.8 oz)   SpO2 99%   BMI 26.11 kg/m²      Physical Exam     RIGHT ANKLE:  There is NO swelling noted at the ankle  Range of motion intact with dorsiflexion and plantarflexion, inversion and eversion  There is NO tenderness of the ATFL, CF or PT of ligament  There is NO tenderness of the lateral malleolus or medial malleolus  Anterior drawer testing is NEGATIVE  Talar tilt testing is NEGATIVE  The foot and ankle is otherwise neurovascularly intact    RIGHT FOOT:  There is NO swelling noted at the foot  There is POSITIVE tenderness at the base of the fifth metatarsal, with some mild tenderness at the cuboid, without any tenderness at the tarsal navicular  There is POSITIVE pain with metatarsal squeeze test    LEFT ANKLE:  There is NO swelling noted at the ankle  Range of motion intact with dorsiflexion and plantarflexion, inversion and eversion  There is NO tenderness of the ATFL, CF or PT of ligament  There is NO tenderness of the lateral malleolus or medial malleolus  Anterior drawer testing is NEGATIVE  Talar tilt testing is NEGATIVE  The foot and ankle is otherwise neurovascularly intact    LEFT FOOT:  There is NO swelling noted at the foot  There is NO tenderness at the base of the fifth metatarsal, cuboid, or tarsal navicular  There is NO pain with metatarsal squeeze test    NEUTRAL stance  Able to ambulate with ANTALGIC gait     Assessment/Plan:     1. Fracture of base of fifth metatarsal bone of right foot at metaphyseal-diaphyseal junction, closed, initial encounter  DX-FOOT-COMPLETE 3+ RIGHT     Date of injury Thursday, August 2, 2018  Base of the fifth metatarsal fracture on the RIGHT side    Given the fact that the patient is still having some pain/tenderness requiring narcotic medication, along with x-ray findings demonstrating " fracture extending into zone 2 of the fifth metatarsal I have recommended nonweightbearing in a short leg cast for at least 3 weeks    Patient is concerned that her employer will not allow her to work under these circumstances.  I have advised her that this is a risk for her since the standard of care is casting this type of fracture and nonweightbearing for a period of time with a transition into a cam walker boot afterwards.    She has been advised that she should NOT drive under any circumstances during her recovery since this would pose a risk to her and anybody else in the vehicle should she have to suddenly slammed her brakes.    She has decided against my advice of casting.  She would prefer to be in a cam walker boot.  She is currently in a short leg Cam walker boot.  We have switched her to a long leg Cam walker boot to provide additional support and hopefully enhance fracture healing.    Return in about 1 week (around 8/20/2018).          8/3/2018 10:16 AM    HISTORY/REASON FOR EXAM:  fell off backyard patio with pain and swelling to lateral foot  Pain/Deformity Following Trauma.      TECHNIQUE/EXAM DESCRIPTION:  3 views RIGHT foot.    COMPARISON:  None.    FINDINGS:  Comminuted nondisplaced fracture through the base of the fifth metatarsal.  Soft tissue swelling.  No other fractures identified.   Impression       Base of fifth metatarsal fracture.     Interpreted in the office today with the patient    Thank you Vanna Banda D.O. for allowing me to participate in caring for your patient.

## 2018-08-20 ENCOUNTER — OFFICE VISIT (OUTPATIENT)
Dept: MEDICAL GROUP | Facility: CLINIC | Age: 38
End: 2018-08-20
Payer: COMMERCIAL

## 2018-08-20 VITALS
WEIGHT: 176.8 LBS | HEIGHT: 69 IN | HEART RATE: 77 BPM | OXYGEN SATURATION: 100 % | TEMPERATURE: 99.1 F | RESPIRATION RATE: 16 BRPM | DIASTOLIC BLOOD PRESSURE: 72 MMHG | BODY MASS INDEX: 26.19 KG/M2 | SYSTOLIC BLOOD PRESSURE: 114 MMHG

## 2018-08-20 DIAGNOSIS — S99.191D CLOSED FRACTURE OF BASE OF FIFTH METATARSAL BONE OF RIGHT FOOT AT METAPHYSEAL-DIAPHYSEAL JUNCTION, WITH ROUTINE HEALING, SUBSEQUENT ENCOUNTER: ICD-10-CM

## 2018-08-20 PROCEDURE — 99024 POSTOP FOLLOW-UP VISIT: CPT | Performed by: FAMILY MEDICINE

## 2018-08-20 ASSESSMENT — ENCOUNTER SYMPTOMS
NAUSEA: 0
SHORTNESS OF BREATH: 0
CHILLS: 0
FEVER: 0
DIZZINESS: 0
VOMITING: 0

## 2018-08-20 NOTE — PROGRESS NOTES
"Subjective:      Jennie Morales is a 37 y.o. female who presents with Foot Problem (F/V R foot injury )     Referred by Vanna Banda D.O. for evaluation of RIGHT foot pain    Foot Problem   Pertinent negatives include no chest pain, chills, fever, nausea or vomiting.      RIGHT foot pain  Date of injury Thursday, August 2, 2018  Mechanism of injury, slipped off of her patio, does not recall exact mechanism of injury  May have been inversion injury    Improved with long Cam walker walker  Worse with excessive weightbearing  No radiation  Currently her pain is improved    Review of Systems   Constitutional: Negative for chills and fever.   Respiratory: Negative for shortness of breath.    Cardiovascular: Negative for chest pain.   Gastrointestinal: Negative for nausea and vomiting.   Neurological: Negative for dizziness.     PMH:  has a past medical history of Anemia; Fibroids; Hyperlipidemia; and Urinary incontinence.  MEDS:   Current Outpatient Prescriptions:   •  pyridoxine (VITAMIN B-6) 50 MG Tab, Take 1 Tab by mouth every day., Disp: 30 Tab, Rfl: 11  •  ferrous sulfate 325 (65 Fe) MG EC tablet, Take 1 Tab by mouth every 12 hours., Disp: 90 Tab, Rfl: 1  •  naproxen (NAPROSYN) 500 MG Tab, Start 1/2 tab 2-4 times aily, max dose 1000 mg per day, Disp: 60 Tab, Rfl: 1  •  Mefenamic Acid 250 MG Cap, Initial: 500 mg, then 250 mg every 6 hours as needed; usually not to exceed 1 week, Disp: 30 Cap, Rfl: 1  ALLERGIES: No Known Allergies  SURGHX:   Past Surgical History:   Procedure Laterality Date   • GYN SURGERY      fibroids     SOCHX:  reports that she has never smoked. She has never used smokeless tobacco. She reports that she drinks alcohol. She reports that she does not use drugs.  FH: Family history was reviewed, no pertinent findings to report     Objective:     /72   Pulse 77   Temp 37.3 °C (99.1 °F)   Resp 16   Ht 1.753 m (5' 9\")   Wt 80.2 kg (176 lb 12.8 oz)   SpO2 100%   BMI 26.11 kg/m²  "     Physical Exam    RIGHT FOOT:  There is NO swelling noted at the foot  There is MINIMAL tenderness at the base of the fifth metatarsal, withOUT tenderness at the cuboid    NEUTRAL stance  Able to ambulate with NORMAL gait in cam walker     Assessment/Plan:     1. Closed fracture of base of fifth metatarsal bone of right foot at metaphyseal-diaphyseal junction, with routine healing, subsequent encounter        Date of injury Thursday, August 2, 2018  Base of the fifth metatarsal fracture on the RIGHT side    She has been advised that she should NOT drive under any circumstances during her recovery since this would pose a risk to her and anybody else in the vehicle should she have to suddenly slammed her brakes.    She has decided against my advice of casting.    Fortunately, she is having less/little pain in long leg Cam walker boot    The plan is to continue cam walker boot for a total of 6 weeks from the time of injury (until on or after September 13, 2018)    Return in about 2 weeks (around 9/3/2018).  For tenderness check          8/3/2018 10:16 AM    HISTORY/REASON FOR EXAM:  fell off backyard patio with pain and swelling to lateral foot  Pain/Deformity Following Trauma.      TECHNIQUE/EXAM DESCRIPTION:  3 views RIGHT foot.    COMPARISON:  None.    FINDINGS:  Comminuted nondisplaced fracture through the base of the fifth metatarsal.  Soft tissue swelling.  No other fractures identified.   Impression       Base of fifth metatarsal fracture.     Thank you Vanna Banda D.O. for allowing me to participate in caring for your patient.

## 2018-09-04 ENCOUNTER — OFFICE VISIT (OUTPATIENT)
Dept: MEDICAL GROUP | Facility: CLINIC | Age: 38
End: 2018-09-04
Payer: COMMERCIAL

## 2018-09-04 VITALS
HEART RATE: 98 BPM | TEMPERATURE: 99.6 F | DIASTOLIC BLOOD PRESSURE: 70 MMHG | OXYGEN SATURATION: 98 % | SYSTOLIC BLOOD PRESSURE: 110 MMHG | RESPIRATION RATE: 16 BRPM | BODY MASS INDEX: 26.07 KG/M2 | HEIGHT: 69 IN | WEIGHT: 176 LBS

## 2018-09-04 DIAGNOSIS — S99.191D CLOSED FRACTURE OF BASE OF FIFTH METATARSAL BONE OF RIGHT FOOT AT METAPHYSEAL-DIAPHYSEAL JUNCTION, WITH ROUTINE HEALING, SUBSEQUENT ENCOUNTER: ICD-10-CM

## 2018-09-04 PROCEDURE — 99024 POSTOP FOLLOW-UP VISIT: CPT | Performed by: FAMILY MEDICINE

## 2018-09-04 ASSESSMENT — ENCOUNTER SYMPTOMS
CHILLS: 0
NAUSEA: 0
DIZZINESS: 0
VOMITING: 0
FEVER: 0
SHORTNESS OF BREATH: 0

## 2018-09-04 NOTE — PROGRESS NOTES
"Subjective:      Jennie Morales is a 37 y.o. female who presents with Fracture (follow up on right foot injury)     Referred by Vanna Banda D.O. for evaluation of RIGHT foot pain    Foot Problem   Pertinent negatives include no chest pain, chills, fever, nausea or vomiting.      RIGHT foot pain  Date of injury Thursday, August 2, 2018  Mechanism of injury, slipped off of her patio, does not recall exact mechanism of injury  May have been inversion injury    Improved with long Cam walker walker  MINIMAL to no pain in boot  No radiation    Review of Systems   Constitutional: Negative for chills and fever.   Respiratory: Negative for shortness of breath.    Cardiovascular: Negative for chest pain.   Gastrointestinal: Negative for nausea and vomiting.   Neurological: Negative for dizziness.     PMH:  has a past medical history of Anemia; Fibroids; Hyperlipidemia; and Urinary incontinence.  MEDS:   Current Outpatient Prescriptions:   •  pyridoxine (VITAMIN B-6) 50 MG Tab, Take 1 Tab by mouth every day., Disp: 30 Tab, Rfl: 11  •  ferrous sulfate 325 (65 Fe) MG EC tablet, Take 1 Tab by mouth every 12 hours., Disp: 90 Tab, Rfl: 1  •  naproxen (NAPROSYN) 500 MG Tab, Start 1/2 tab 2-4 times aily, max dose 1000 mg per day, Disp: 60 Tab, Rfl: 1  •  Mefenamic Acid 250 MG Cap, Initial: 500 mg, then 250 mg every 6 hours as needed; usually not to exceed 1 week, Disp: 30 Cap, Rfl: 1  ALLERGIES: No Known Allergies  SURGHX:   Past Surgical History:   Procedure Laterality Date   • GYN SURGERY      fibroids     SOCHX:  reports that she has never smoked. She has never used smokeless tobacco. She reports that she drinks alcohol. She reports that she does not use drugs.  FH: Family history was reviewed, no pertinent findings to report     Objective:     /70   Pulse 98   Temp 37.6 °C (99.6 °F)   Resp 16   Ht 1.753 m (5' 9\")   Wt 79.8 kg (176 lb)   SpO2 98%   BMI 25.99 kg/m²      Physical Exam    RIGHT FOOT:  There " is NO swelling noted at the foot  There is MINIMAL tenderness at the base of the fifth metatarsal dorsally    NEUTRAL stance  Able to ambulate with NORMAL gait in cam walker     Assessment/Plan:     1. Closed fracture of base of fifth metatarsal bone of right foot at metaphyseal-diaphyseal junction, with routine healing, subsequent encounter        Date of injury Thursday, August 2, 2018  Base of the fifth metatarsal fracture on the RIGHT side    She has decided against my advice of casting.    Fortunately, she is having less/little pain in long leg Cam walker boot although some continued MILD tenderness at the dorsal base of the 5th metatarsal    The plan is to continue cam walker boot for a total of 6 weeks from the time of injury (until on or after September 13, 2018)    Return in about 2 weeks (around 9/18/2018).  She should be out of cam walker for 5 days by then          8/3/2018 10:16 AM    HISTORY/REASON FOR EXAM:  fell off backyard patio with pain and swelling to lateral foot  Pain/Deformity Following Trauma.      TECHNIQUE/EXAM DESCRIPTION:  3 views RIGHT foot.    COMPARISON:  None.    FINDINGS:  Comminuted nondisplaced fracture through the base of the fifth metatarsal.  Soft tissue swelling.  No other fractures identified.   Impression       Base of fifth metatarsal fracture.     Thank you Vanna Banda D.O. for allowing me to participate in caring for your patient.

## 2018-09-17 ENCOUNTER — OFFICE VISIT (OUTPATIENT)
Dept: MEDICAL GROUP | Facility: CLINIC | Age: 38
End: 2018-09-17
Payer: COMMERCIAL

## 2018-09-17 VITALS
HEART RATE: 98 BPM | BODY MASS INDEX: 26.07 KG/M2 | DIASTOLIC BLOOD PRESSURE: 76 MMHG | RESPIRATION RATE: 18 BRPM | SYSTOLIC BLOOD PRESSURE: 116 MMHG | WEIGHT: 176 LBS | HEIGHT: 69 IN | TEMPERATURE: 97.8 F | OXYGEN SATURATION: 97 %

## 2018-09-17 DIAGNOSIS — S99.191D CLOSED FRACTURE OF BASE OF FIFTH METATARSAL BONE OF RIGHT FOOT AT METAPHYSEAL-DIAPHYSEAL JUNCTION, WITH ROUTINE HEALING, SUBSEQUENT ENCOUNTER: ICD-10-CM

## 2018-09-17 PROCEDURE — 99024 POSTOP FOLLOW-UP VISIT: CPT | Performed by: FAMILY MEDICINE

## 2018-09-17 ASSESSMENT — ENCOUNTER SYMPTOMS
NAUSEA: 0
SHORTNESS OF BREATH: 0
CHILLS: 0
FEVER: 0
VOMITING: 0
DIZZINESS: 0

## 2018-09-17 NOTE — PROGRESS NOTES
"Subjective:      Jennie Morales is a 37 y.o. female who presents with Foot Problem (F/V R foot injury )     FOLLOW UP RIGHT foot pain    Foot Problem   Pertinent negatives include no chest pain, chills, fever, nausea or vomiting.      RIGHT foot pain  Date of injury Thursday, August 2, 2018  Mechanism of injury, slipped off of her patio, does not recall exact mechanism of injury  May have been inversion injury    She has been out of cam walker boot and it is not having any pain or swelling    Review of Systems   Constitutional: Negative for chills and fever.   Respiratory: Negative for shortness of breath.    Cardiovascular: Negative for chest pain.   Gastrointestinal: Negative for nausea and vomiting.   Neurological: Negative for dizziness.     PMH:  has a past medical history of Anemia; Fibroids; Hyperlipidemia; and Urinary incontinence.  MEDS:   Current Outpatient Prescriptions:   •  pyridoxine (VITAMIN B-6) 50 MG Tab, Take 1 Tab by mouth every day., Disp: 30 Tab, Rfl: 11  •  ferrous sulfate 325 (65 Fe) MG EC tablet, Take 1 Tab by mouth every 12 hours., Disp: 90 Tab, Rfl: 1  •  naproxen (NAPROSYN) 500 MG Tab, Start 1/2 tab 2-4 times aily, max dose 1000 mg per day, Disp: 60 Tab, Rfl: 1  •  Mefenamic Acid 250 MG Cap, Initial: 500 mg, then 250 mg every 6 hours as needed; usually not to exceed 1 week, Disp: 30 Cap, Rfl: 1  ALLERGIES: No Known Allergies  SURGHX:   Past Surgical History:   Procedure Laterality Date   • GYN SURGERY      fibroids     SOCHX:  reports that she has never smoked. She has never used smokeless tobacco. She reports that she drinks alcohol. She reports that she does not use drugs.  FH: Family history was reviewed, no pertinent findings to report     Objective:     /76   Pulse 98   Temp 36.6 °C (97.8 °F)   Resp 18   Ht 1.753 m (5' 9\")   Wt 79.8 kg (176 lb)   SpO2 97%   BMI 25.99 kg/m²      Physical Exam    RIGHT FOOT:  NO fracture tight tenderness  NORMAL gait     "   Assessment/Plan:     1. Closed fracture of base of fifth metatarsal bone of right foot at metaphyseal-diaphyseal junction, with routine healing, subsequent encounter          Date of injury Thursday, August 2, 2018  Base of the fifth metatarsal fracture on the RIGHT side    He has been out of cam walker boot and is doing well  Note provided, cleared for work without restriction    Return if symptoms worsen or fail to improve.           8/3/2018 10:16 AM    HISTORY/REASON FOR EXAM:  fell off backyard patio with pain and swelling to lateral foot  Pain/Deformity Following Trauma.      TECHNIQUE/EXAM DESCRIPTION:  3 views RIGHT foot.    COMPARISON:  None.    FINDINGS:  Comminuted nondisplaced fracture through the base of the fifth metatarsal.  Soft tissue swelling.  No other fractures identified.   Impression       Base of fifth metatarsal fracture.     Thank you Vanna Banda D.O. for allowing me to participate in caring for your patient.

## 2018-09-17 NOTE — LETTER
September 17, 2018         Patient: Jennie Morales   YOB: 1980   Date of Visit: 9/17/2018           To Whom it May Concern:    Jennie Morales was seen in my clinic on 9/17/2018. She may return to work without restriction.    If you have any questions or concerns, please don't hesitate to call.        Sincerely,           Get Rodriguez M.D.  Electronically Signed

## 2018-11-11 DIAGNOSIS — N92.1 MENOMETRORRHAGIA: ICD-10-CM

## 2018-11-11 DIAGNOSIS — N94.6 DYSMENORRHEA: ICD-10-CM

## 2018-11-12 RX ORDER — NAPROXEN 500 MG/1
TABLET ORAL
Qty: 60 TAB | Refills: 1 | Status: SHIPPED | OUTPATIENT
Start: 2018-11-12 | End: 2022-03-16

## 2018-11-12 NOTE — TELEPHONE ENCOUNTER
Was the patient seen in the last year in this department? NO     Does patient have an active prescription for medications requested? No     Received Request Via: Pharmacy

## 2018-12-03 RX ORDER — PNV NO.95/FERROUS FUM/FOLIC AC 28MG-0.8MG
TABLET ORAL
Qty: 90 TAB | Refills: 0 | Status: SHIPPED | OUTPATIENT
Start: 2018-12-03 | End: 2022-03-16

## 2020-09-29 PROBLEM — Z71.85 IMMUNIZATION COUNSELING: Status: ACTIVE | Noted: 2020-09-29

## 2020-09-29 PROBLEM — E55.9 HYPOVITAMINOSIS D: Status: ACTIVE | Noted: 2020-09-29

## 2020-09-29 PROBLEM — E78.5 DYSLIPIDEMIA: Status: ACTIVE | Noted: 2020-09-29

## 2020-10-13 ENCOUNTER — TELEPHONE (OUTPATIENT)
Dept: SCHEDULING | Facility: IMAGING CENTER | Age: 40
End: 2020-10-13

## 2020-11-11 ENCOUNTER — NON-PROVIDER VISIT (OUTPATIENT)
Dept: OCCUPATIONAL MEDICINE | Facility: CLINIC | Age: 40
End: 2020-11-11

## 2020-11-11 VITALS — TEMPERATURE: 98.1 F

## 2020-11-11 DIAGNOSIS — Z71.84 TRAVEL ADVICE ENCOUNTER: ICD-10-CM

## 2020-11-11 DIAGNOSIS — Z23 ENCOUNTER FOR IMMUNIZATION: ICD-10-CM

## 2020-11-11 PROCEDURE — 90632 HEPA VACCINE ADULT IM: CPT | Performed by: NURSE PRACTITIONER

## 2020-11-11 PROCEDURE — 90691 TYPHOID VACCINE IM: CPT | Performed by: NURSE PRACTITIONER

## 2020-11-11 PROCEDURE — 90472 IMMUNIZATION ADMIN EACH ADD: CPT | Performed by: NURSE PRACTITIONER

## 2020-11-11 PROCEDURE — 90471 IMMUNIZATION ADMIN: CPT | Performed by: NURSE PRACTITIONER

## 2020-11-12 NOTE — PROGRESS NOTES
Travel dates:12/13/20-1/14/21  Countries to be visited: [facility]  Reason for travel: family emergency- support for a friend    Rural travel: unknown   High altitude travel: unknown    Accommodations:  Hotel:   Hostel:  Camping:  Cruise:  With family: yes  Other:    Vaccines in past 30 days? No  Sick today? No  Allergies: None    The screening intake form was reviewed with the traveler. Health risks associated with their travel plans have been reviewed and discussed with the traveler. The traveler has been provided with vaccine information statements for the vaccines that are recommended and given the opportunity to discuss risks and benefits of vaccination and or medications. The traveler has received education on the travel itinerary provided and on the following topics.    Personal safety precautions: Yes  Food and water precautions: Yes  Management of traveler's diarrhea: Yes  Mosquito/insect bite prevention:Yes  Animal bites/Rabies prevention: No  High altitude precautions: No      RN comments:     Hep A and Typhoid vaccine administered, vis given.    Malaria prophylaxis recommended. Patient aware of risks and benefits but declines medication at this time.  Pt states she will see her PCP for Rx.     Travelers diarrhea self tx recommended. Patient aware of risks and benefits but declines medication at this time.  Pt stated she will see her PCP for Rx.     Yellow fever recommended for travel to [facility]. Advised patient to call [facility] for vaccine.    Physician consultation required: no

## 2021-02-24 NOTE — TELEPHONE ENCOUNTER
Please notify patient the following:  - Hemoglobin is 7.3, just above the limit for transfusion  - Advised to take up iron tablets that are prescribed for her in her pharmacy and starts today  - Check CBC only in one month (no OV)  - Office visit as discussed, with prior labs, in 3 months.   Please let me know that message was delivered.s  Thank you, Dr. Ricardo       Estimated Blood Loss (Cc): minimal

## 2021-08-16 NOTE — NON-PROVIDER
1. TB Evaluation Questionnaire Completed By Patient.   2. Patient Notified To Return To The Clinic For Reading Between 48-72 Hours.   3. PPD Placement Documentation Completed.  4. TB Evaluation Questionnaire Filed.       
epigastric pain, n/v/d

## 2022-01-31 ENCOUNTER — TELEPHONE (OUTPATIENT)
Dept: SCHEDULING | Facility: IMAGING CENTER | Age: 42
End: 2022-01-31

## 2022-02-07 ENCOUNTER — OFFICE VISIT (OUTPATIENT)
Dept: MEDICAL GROUP | Facility: MEDICAL CENTER | Age: 42
End: 2022-02-07
Payer: COMMERCIAL

## 2022-02-07 VITALS
WEIGHT: 156.53 LBS | DIASTOLIC BLOOD PRESSURE: 62 MMHG | TEMPERATURE: 97.8 F | SYSTOLIC BLOOD PRESSURE: 120 MMHG | HEART RATE: 97 BPM | OXYGEN SATURATION: 96 % | BODY MASS INDEX: 24.57 KG/M2 | RESPIRATION RATE: 20 BRPM | HEIGHT: 67 IN

## 2022-02-07 DIAGNOSIS — E55.9 HYPOVITAMINOSIS D: ICD-10-CM

## 2022-02-07 DIAGNOSIS — E78.5 DYSLIPIDEMIA: ICD-10-CM

## 2022-02-07 DIAGNOSIS — N92.1 MENOMETRORRHAGIA: ICD-10-CM

## 2022-02-07 DIAGNOSIS — Z00.00 HEALTH CARE MAINTENANCE: ICD-10-CM

## 2022-02-07 DIAGNOSIS — R03.0 ELEVATED BLOOD PRESSURE READING: ICD-10-CM

## 2022-02-07 DIAGNOSIS — Z86.2 HISTORY OF ANEMIA: ICD-10-CM

## 2022-02-07 DIAGNOSIS — Z82.49 FAMILY HISTORY OF CARDIAC DISORDER: ICD-10-CM

## 2022-02-07 PROCEDURE — 99204 OFFICE O/P NEW MOD 45 MIN: CPT | Performed by: INTERNAL MEDICINE

## 2022-02-07 ASSESSMENT — ENCOUNTER SYMPTOMS
HEMOPTYSIS: 0
COUGH: 0
CHILLS: 0
DIZZINESS: 0
PALPITATIONS: 0
ABDOMINAL PAIN: 0
VOMITING: 0
FEVER: 0
HEARTBURN: 0
DEPRESSION: 0

## 2022-02-07 ASSESSMENT — PATIENT HEALTH QUESTIONNAIRE - PHQ9: CLINICAL INTERPRETATION OF PHQ2 SCORE: 0

## 2022-02-08 NOTE — ASSESSMENT & PLAN NOTE
Will obtain CBC to evaluate hemoglobin level as patient is currently having complaints, consistent with anemia.

## 2022-02-08 NOTE — PROGRESS NOTES
Subjective:     Chief Complaint   Patient presents with   • Hypertension   • Establish Care      Diagnoses of Elevated blood pressure reading, Menometrorrhagia, Hypovitaminosis D, Dyslipidemia, Health care maintenance, Family history of cardiac disorder, and History of anemia were pertinent to this visit.    HISTORY OF THE PRESENT ILLNESS: Patient is a 41 y.o. female. This pleasant patient is here today to establish care and discuss her elevated blood pressure readings.. Her prior PCP was Dr Ricardo, however she has not seen her for a couple years now..    Problem   Elevated Blood Pressure Reading    Patient reports that she has been checking her blood pressure at work and at 1 time her reading was 180/102 mmHg.  Patient reports that she has been working too much lately and she has been feeling stressed.  At the time of the visit patient blood pressure is 120/60 2 mmHg.  Advised patient to buy blood pressure cuff for upper arm and to check her blood pressure at home every day.     History of Anemia    Lab Results   Component Value Date/Time    WBC 6.1 08/18/2017 07:53 AM    RBC 4.46 08/18/2017 07:53 AM    HEMOGLOBIN 9.3 (L) 08/18/2017 07:53 AM    HEMATOCRIT 33.0 (L) 08/18/2017 07:53 AM    MCV 74.0 (L) 08/18/2017 07:53 AM    MCH 20.9 (L) 08/18/2017 07:53 AM    MCHC 28.2 (L) 08/18/2017 07:53 AM    MPV 10.4 08/18/2017 07:53 AM      No recent labs available, patient reports fatigue chest pain mucous membranes in the physical exam.  Will obtain CBC.     Dyslipidemia    This is a chronic condition, patient is not on medication at this time.  Lab Results   Component Value Date/Time    CHOLSTRLTOT 232 (H) 03/20/2017 08:26 AM    TRIGLYCERIDE 66 03/20/2017 08:26 AM    HDL 78 03/20/2017 08:26 AM     (H) 03/20/2017 08:26 AM     Most recent lipid panel available from almost 5 years ago as above.  Patient reports significant family history of early heart attacks in her first-degree relatives.       Hypovitaminosis D    This is  a chronic problem, patient reports that she is not consistent with taking vitamin D supplementation     Menometrorrhagia    Patient reports heavy bleedings and she reports that she had fibroids, that were removed few years back.  She now reports recurrent heavy menstrual bleedings.  Ordered pelvic ultrasound and refer patient to gynecology to establish care.       Past Medical History:   Diagnosis Date   • Anemia    • Fibroids    • Hyperlipidemia    • Urinary incontinence      Past Surgical History:   Procedure Laterality Date   • GYN SURGERY      fibroids     Family History   Problem Relation Age of Onset   • Diabetes Father    • Hyperlipidemia Father    • Hyperlipidemia Mother    • Psychiatric Illness Mother    • Cancer Sister         cervical cance   • Cancer Maternal Grandmother    • Hyperlipidemia Sister    • Psychiatric Illness Sister    • Heart Disease Sister    • Heart Disease Brother    • Thyroid Neg Hx      Social History     Tobacco Use   • Smoking status: Never Smoker   • Smokeless tobacco: Never Used   Substance Use Topics   • Alcohol use: Yes     Alcohol/week: 0.0 oz     Comment: occ   • Drug use: No     Current Outpatient Medications Ordered in Epic   Medication Sig Dispense Refill   • multivitamin (THERAGRAN) Tab Take 1 Tablet by mouth every day.     • Ferrous Sulfate (IRON) 325 (65 Fe) MG Tab TAKE ONE TABLET BY MOUTH EVERY 12 HOURS 90 Tab 0   • naproxen (NAPROSYN) 500 MG Tab START ONE-HALF TABLET BY MOUTH 2 TO 4 TIMES DAILY *  MAX  DOSE  1000MG  PER  DAY* 60 Tab 1   • pyridoxine (VITAMIN B-6) 50 MG Tab Take 1 Tab by mouth every day. 30 Tab 11   • Mefenamic Acid 250 MG Cap Initial: 500 mg, then 250 mg every 6 hours as needed; usually not to exceed 1 week 30 Cap 1     No current Epic-ordered facility-administered medications on file.     Health Maintenance: Deferred to next visit    Review of Systems   Constitutional: Positive for malaise/fatigue. Negative for chills and fever.   Respiratory: Negative  "for cough and hemoptysis.    Cardiovascular: Negative for chest pain and palpitations.   Gastrointestinal: Negative for abdominal pain, heartburn and vomiting.   Neurological: Negative for dizziness.   Psychiatric/Behavioral: Negative for depression.     Objective:     Exam: /62 (BP Location: Right arm, Patient Position: Sitting, BP Cuff Size: Adult)   Pulse 97   Temp 36.6 °C (97.8 °F) (Temporal)   Resp 20   Ht 1.71 m (5' 7.32\")   Wt 71 kg (156 lb 8.4 oz)   SpO2 96%  Body mass index is 24.28 kg/m².    Physical Exam  Constitutional:       Appearance: Normal appearance.   HENT:      Head: Normocephalic and atraumatic.      Nose: Nose normal.      Mouth/Throat:      Mouth: Mucous membranes are moist.      Pharynx: Oropharynx is clear. No oropharyngeal exudate.   Cardiovascular:      Rate and Rhythm: Normal rate and regular rhythm.      Pulses: Normal pulses.      Heart sounds: Normal heart sounds.   Pulmonary:      Effort: Pulmonary effort is normal. No respiratory distress.      Breath sounds: Normal breath sounds. No wheezing or rales.   Abdominal:      General: Abdomen is flat.   Skin:     General: Skin is warm and dry.      Coloration: Skin is pale.      Findings: No erythema or rash.   Neurological:      General: No focal deficit present.      Mental Status: She is alert and oriented to person, place, and time. Mental status is at baseline.      Sensory: No sensory deficit.   Psychiatric:         Mood and Affect: Mood normal.         Thought Content: Thought content normal.         Judgment: Judgment normal.       Labs: Reviewed CBC from 08/2017, lipid panel from 03/2017    Assessment & Plan:   41 y.o. female with the following -    Problem List Items Addressed This Visit     Dyslipidemia     This is a chronic problem, patient after medication at this time.  We will obtain lipid panel         Relevant Orders    Lipid Profile    EC-ECHOCARDIOGRAM COMPLETE W/O CONT    Elevated blood pressure reading     " This is a new issue, patient reported elevated reading in her report.  Advised patient to get blood pressure cuff for upper arm and check her blood pressure at home for the next 2 weeks and bring blood pressure log to the next visit.         Relevant Orders    CBC WITH DIFFERENTIAL    TSH WITH REFLEX TO FT4    EC-ECHOCARDIOGRAM COMPLETE W/O CONT    Health care maintenance    Relevant Orders    Comp Metabolic Panel    History of anemia     Will obtain CBC to evaluate hemoglobin level as patient is currently having complaints, consistent with anemia.          Hypovitaminosis D     This is a chronic problem, patient does not take vitamin D regularly.  We will check vitamin D levels.         Relevant Orders    VITAMIN D,25 HYDROXY    Menometrorrhagia     Will obtain pelvic ultrasound for patient to gynecology to establish care.         Relevant Orders    US-PELVIC TRANSABDOMINAL ONLY    Referral to Gynecology    CBC WITH DIFFERENTIAL      Other Visit Diagnoses     Family history of cardiac disorder            Return in about 2 weeks (around 2/21/2022).    Please note that this dictation was created using voice recognition software. I have made every reasonable attempt to correct obvious errors, but I expect that there are errors of grammar and possibly content that I did not discover before finalizing the note.

## 2022-02-08 NOTE — ASSESSMENT & PLAN NOTE
This is a new issue, patient reported elevated reading in her report.  Advised patient to get blood pressure cuff for upper arm and check her blood pressure at home for the next 2 weeks and bring blood pressure log to the next visit.

## 2022-02-08 NOTE — ASSESSMENT & PLAN NOTE
This is a chronic problem, patient does not take vitamin D regularly.  We will check vitamin D levels.

## 2022-02-10 ENCOUNTER — HOSPITAL ENCOUNTER (EMERGENCY)
Facility: MEDICAL CENTER | Age: 42
End: 2022-02-11
Attending: EMERGENCY MEDICINE
Payer: COMMERCIAL

## 2022-02-10 ENCOUNTER — HOSPITAL ENCOUNTER (OUTPATIENT)
Dept: LAB | Facility: MEDICAL CENTER | Age: 42
End: 2022-02-10
Attending: INTERNAL MEDICINE
Payer: COMMERCIAL

## 2022-02-10 ENCOUNTER — APPOINTMENT (OUTPATIENT)
Dept: RADIOLOGY | Facility: MEDICAL CENTER | Age: 42
End: 2022-02-10
Attending: EMERGENCY MEDICINE
Payer: COMMERCIAL

## 2022-02-10 DIAGNOSIS — R03.0 ELEVATED BLOOD PRESSURE READING: ICD-10-CM

## 2022-02-10 DIAGNOSIS — E55.9 HYPOVITAMINOSIS D: ICD-10-CM

## 2022-02-10 DIAGNOSIS — Z00.00 HEALTH CARE MAINTENANCE: ICD-10-CM

## 2022-02-10 DIAGNOSIS — E78.5 DYSLIPIDEMIA: ICD-10-CM

## 2022-02-10 DIAGNOSIS — N92.1 MENOMETRORRHAGIA: ICD-10-CM

## 2022-02-10 LAB
25(OH)D3 SERPL-MCNC: 25 NG/ML (ref 30–100)
ABO GROUP BLD: NORMAL
ALBUMIN SERPL BCP-MCNC: 3.7 G/DL (ref 3.2–4.9)
ALBUMIN/GLOB SERPL: 1 G/DL
ALP SERPL-CCNC: 42 U/L (ref 30–99)
ALT SERPL-CCNC: 7 U/L (ref 2–50)
ANION GAP SERPL CALC-SCNC: 10 MMOL/L (ref 7–16)
ANION GAP SERPL CALC-SCNC: 8 MMOL/L (ref 7–16)
ANISOCYTOSIS BLD QL SMEAR: ABNORMAL
APTT PPP: 27.5 SEC (ref 24.7–36)
AST SERPL-CCNC: 25 U/L (ref 12–45)
BARCODED ABORH UBTYP: 9500
BARCODED PRD CODE UBPRD: NORMAL
BARCODED UNIT NUM UBUNT: NORMAL
BASOPHILS # BLD AUTO: 0.6 % (ref 0–1.8)
BASOPHILS # BLD AUTO: 0.6 % (ref 0–1.8)
BASOPHILS # BLD: 0.03 K/UL (ref 0–0.12)
BASOPHILS # BLD: 0.04 K/UL (ref 0–0.12)
BILIRUB SERPL-MCNC: 0.3 MG/DL (ref 0.1–1.5)
BLD GP AB SCN SERPL QL: NORMAL
BUN SERPL-MCNC: 12 MG/DL (ref 8–22)
BUN SERPL-MCNC: 14 MG/DL (ref 8–22)
CALCIUM SERPL-MCNC: 8.9 MG/DL (ref 8.4–10.2)
CALCIUM SERPL-MCNC: 9 MG/DL (ref 8.4–10.2)
CHLORIDE SERPL-SCNC: 104 MMOL/L (ref 96–112)
CHLORIDE SERPL-SCNC: 107 MMOL/L (ref 96–112)
CHOLEST SERPL-MCNC: 230 MG/DL (ref 100–199)
CO2 SERPL-SCNC: 22 MMOL/L (ref 20–33)
CO2 SERPL-SCNC: 23 MMOL/L (ref 20–33)
COMMENT 1642: NORMAL
COMPONENT R 8504R: NORMAL
CREAT SERPL-MCNC: 0.79 MG/DL (ref 0.5–1.4)
CREAT SERPL-MCNC: 0.82 MG/DL (ref 0.5–1.4)
EOSINOPHIL # BLD AUTO: 0.03 K/UL (ref 0–0.51)
EOSINOPHIL # BLD AUTO: 0.05 K/UL (ref 0–0.51)
EOSINOPHIL NFR BLD: 0.6 % (ref 0–6.9)
EOSINOPHIL NFR BLD: 0.7 % (ref 0–6.9)
ERYTHROCYTE [DISTWIDTH] IN BLOOD BY AUTOMATED COUNT: 49.9 FL (ref 35.9–50)
ERYTHROCYTE [DISTWIDTH] IN BLOOD BY AUTOMATED COUNT: 50.3 FL (ref 35.9–50)
FASTING STATUS PATIENT QL REPORTED: NORMAL
GLOBULIN SER CALC-MCNC: 3.6 G/DL (ref 1.9–3.5)
GLUCOSE SERPL-MCNC: 139 MG/DL (ref 65–99)
GLUCOSE SERPL-MCNC: 84 MG/DL (ref 65–99)
HCG SERPL QL: NEGATIVE
HCT VFR BLD AUTO: 23.8 % (ref 37–47)
HCT VFR BLD AUTO: 24.2 % (ref 37–47)
HDLC SERPL-MCNC: 86 MG/DL
HGB BLD-MCNC: 6.2 G/DL (ref 12–16)
HGB BLD-MCNC: 6.3 G/DL (ref 12–16)
HYPOCHROMIA BLD QL SMEAR: ABNORMAL
IMM GRANULOCYTES # BLD AUTO: 0.01 K/UL (ref 0–0.11)
IMM GRANULOCYTES # BLD AUTO: 0.01 K/UL (ref 0–0.11)
IMM GRANULOCYTES NFR BLD AUTO: 0.1 % (ref 0–0.9)
IMM GRANULOCYTES NFR BLD AUTO: 0.2 % (ref 0–0.9)
INR PPP: 0.95 (ref 0.87–1.13)
LDLC SERPL CALC-MCNC: 131 MG/DL
LYMPHOCYTES # BLD AUTO: 1.84 K/UL (ref 1–4.8)
LYMPHOCYTES # BLD AUTO: 2.52 K/UL (ref 1–4.8)
LYMPHOCYTES NFR BLD: 34.1 % (ref 22–41)
LYMPHOCYTES NFR BLD: 36.1 % (ref 22–41)
MCH RBC QN AUTO: 17.3 PG (ref 27–33)
MCH RBC QN AUTO: 17.3 PG (ref 27–33)
MCHC RBC AUTO-ENTMCNC: 26 G/DL (ref 33.6–35)
MCHC RBC AUTO-ENTMCNC: 26.1 G/DL (ref 33.6–35)
MCV RBC AUTO: 66.3 FL (ref 81.4–97.8)
MCV RBC AUTO: 66.5 FL (ref 81.4–97.8)
MICROCYTES BLD QL SMEAR: ABNORMAL
MONOCYTES # BLD AUTO: 0.25 K/UL (ref 0–0.85)
MONOCYTES # BLD AUTO: 0.27 K/UL (ref 0–0.85)
MONOCYTES NFR BLD AUTO: 3.9 % (ref 0–13.4)
MONOCYTES NFR BLD AUTO: 4.6 % (ref 0–13.4)
NEUTROPHILS # BLD AUTO: 3.23 K/UL (ref 2–7.15)
NEUTROPHILS # BLD AUTO: 4.09 K/UL (ref 2–7.15)
NEUTROPHILS NFR BLD: 58.6 % (ref 44–72)
NEUTROPHILS NFR BLD: 59.9 % (ref 44–72)
NRBC # BLD AUTO: 0 K/UL
NRBC # BLD AUTO: 0 K/UL
NRBC BLD-RTO: 0 /100 WBC
NRBC BLD-RTO: 0 /100 WBC
OVALOCYTES BLD QL SMEAR: NORMAL
PLATELET # BLD AUTO: 281 K/UL (ref 164–446)
PLATELET # BLD AUTO: 290 K/UL (ref 164–446)
PLATELET BLD QL SMEAR: NORMAL
PMV BLD AUTO: 9.3 FL (ref 9–12.9)
PMV BLD AUTO: 9.5 FL (ref 9–12.9)
POIKILOCYTOSIS BLD QL SMEAR: NORMAL
POTASSIUM SERPL-SCNC: 3.2 MMOL/L (ref 3.6–5.5)
POTASSIUM SERPL-SCNC: 3.6 MMOL/L (ref 3.6–5.5)
PRODUCT TYPE UPROD: NORMAL
PROT SERPL-MCNC: 7.3 G/DL (ref 6–8.2)
PROTHROMBIN TIME: 11.9 SEC (ref 12–14.6)
RBC # BLD AUTO: 3.59 M/UL (ref 4.2–5.4)
RBC # BLD AUTO: 3.64 M/UL (ref 4.2–5.4)
RBC BLD AUTO: PRESENT
RH BLD: NORMAL
SODIUM SERPL-SCNC: 135 MMOL/L (ref 135–145)
SODIUM SERPL-SCNC: 139 MMOL/L (ref 135–145)
TRIGL SERPL-MCNC: 66 MG/DL (ref 0–149)
TSH SERPL DL<=0.005 MIU/L-ACNC: 1.52 UIU/ML (ref 0.38–5.33)
UNIT STATUS USTAT: NORMAL
WBC # BLD AUTO: 5.4 K/UL (ref 4.8–10.8)
WBC # BLD AUTO: 7 K/UL (ref 4.8–10.8)

## 2022-02-10 PROCEDURE — 86900 BLOOD TYPING SEROLOGIC ABO: CPT

## 2022-02-10 PROCEDURE — 86901 BLOOD TYPING SEROLOGIC RH(D): CPT

## 2022-02-10 PROCEDURE — 99284 EMERGENCY DEPT VISIT MOD MDM: CPT

## 2022-02-10 PROCEDURE — 80061 LIPID PANEL: CPT

## 2022-02-10 PROCEDURE — 80048 BASIC METABOLIC PNL TOTAL CA: CPT

## 2022-02-10 PROCEDURE — 36430 TRANSFUSION BLD/BLD COMPNT: CPT

## 2022-02-10 PROCEDURE — 84443 ASSAY THYROID STIM HORMONE: CPT

## 2022-02-10 PROCEDURE — 85025 COMPLETE CBC W/AUTO DIFF WBC: CPT | Mod: 91

## 2022-02-10 PROCEDURE — 84703 CHORIONIC GONADOTROPIN ASSAY: CPT

## 2022-02-10 PROCEDURE — 85610 PROTHROMBIN TIME: CPT

## 2022-02-10 PROCEDURE — 82306 VITAMIN D 25 HYDROXY: CPT

## 2022-02-10 PROCEDURE — 86850 RBC ANTIBODY SCREEN: CPT

## 2022-02-10 PROCEDURE — P9016 RBC LEUKOCYTES REDUCED: HCPCS

## 2022-02-10 PROCEDURE — 85730 THROMBOPLASTIN TIME PARTIAL: CPT

## 2022-02-10 PROCEDURE — 80053 COMPREHEN METABOLIC PANEL: CPT

## 2022-02-10 PROCEDURE — 76856 US EXAM PELVIC COMPLETE: CPT

## 2022-02-10 PROCEDURE — 36415 COLL VENOUS BLD VENIPUNCTURE: CPT

## 2022-02-10 PROCEDURE — 86923 COMPATIBILITY TEST ELECTRIC: CPT

## 2022-02-10 RX ORDER — KETOROLAC TROMETHAMINE 30 MG/ML
30 INJECTION, SOLUTION INTRAMUSCULAR; INTRAVENOUS ONCE
Status: DISCONTINUED | OUTPATIENT
Start: 2022-02-10 | End: 2022-02-10

## 2022-02-10 RX ORDER — SODIUM CHLORIDE 9 MG/ML
1000 INJECTION, SOLUTION INTRAVENOUS ONCE
Status: DISCONTINUED | OUTPATIENT
Start: 2022-02-10 | End: 2022-02-10

## 2022-02-10 ASSESSMENT — FIBROSIS 4 INDEX: FIB4 SCORE: 1.41

## 2022-02-11 ENCOUNTER — HOSPITAL ENCOUNTER (EMERGENCY)
Facility: MEDICAL CENTER | Age: 42
End: 2022-02-11
Attending: STUDENT IN AN ORGANIZED HEALTH CARE EDUCATION/TRAINING PROGRAM
Payer: COMMERCIAL

## 2022-02-11 VITALS
OXYGEN SATURATION: 98 % | HEIGHT: 69 IN | DIASTOLIC BLOOD PRESSURE: 71 MMHG | RESPIRATION RATE: 21 BRPM | BODY MASS INDEX: 22.22 KG/M2 | HEART RATE: 73 BPM | WEIGHT: 150 LBS | SYSTOLIC BLOOD PRESSURE: 118 MMHG | TEMPERATURE: 97.7 F

## 2022-02-11 VITALS
BODY MASS INDEX: 24.57 KG/M2 | HEART RATE: 77 BPM | RESPIRATION RATE: 16 BRPM | SYSTOLIC BLOOD PRESSURE: 113 MMHG | OXYGEN SATURATION: 99 % | WEIGHT: 156.53 LBS | DIASTOLIC BLOOD PRESSURE: 67 MMHG | TEMPERATURE: 97.5 F | HEIGHT: 67 IN

## 2022-02-11 DIAGNOSIS — N83.202 CYST OF LEFT OVARY: ICD-10-CM

## 2022-02-11 DIAGNOSIS — D64.9 ACUTE ANEMIA: ICD-10-CM

## 2022-02-11 DIAGNOSIS — D21.9 FIBROID: ICD-10-CM

## 2022-02-11 DIAGNOSIS — N93.9 VAGINAL BLEEDING: Primary | ICD-10-CM

## 2022-02-11 LAB
ABO GROUP BLD: NORMAL
BARCODED ABORH UBTYP: 9500
BARCODED PRD CODE UBPRD: NORMAL
BARCODED UNIT NUM UBUNT: NORMAL
BASOPHILS # BLD AUTO: 0.8 % (ref 0–1.8)
BASOPHILS # BLD: 0.05 K/UL (ref 0–0.12)
BLD GP AB SCN SERPL QL: NORMAL
COMPONENT R 8504R: NORMAL
EOSINOPHIL # BLD AUTO: 0.07 K/UL (ref 0–0.51)
EOSINOPHIL NFR BLD: 1.1 % (ref 0–6.9)
ERYTHROCYTE [DISTWIDTH] IN BLOOD BY AUTOMATED COUNT: 50.8 FL (ref 35.9–50)
HCT VFR BLD AUTO: 23 % (ref 37–47)
HGB BLD-MCNC: 6.2 G/DL (ref 12–16)
IMM GRANULOCYTES # BLD AUTO: 0.01 K/UL (ref 0–0.11)
IMM GRANULOCYTES NFR BLD AUTO: 0.2 % (ref 0–0.9)
LYMPHOCYTES # BLD AUTO: 2.72 K/UL (ref 1–4.8)
LYMPHOCYTES NFR BLD: 44.2 % (ref 22–41)
MCH RBC QN AUTO: 18.2 PG (ref 27–33)
MCHC RBC AUTO-ENTMCNC: 27 G/DL (ref 33.6–35)
MCV RBC AUTO: 67.4 FL (ref 81.4–97.8)
MONOCYTES # BLD AUTO: 0.36 K/UL (ref 0–0.85)
MONOCYTES NFR BLD AUTO: 5.9 % (ref 0–13.4)
NEUTROPHILS # BLD AUTO: 2.94 K/UL (ref 2–7.15)
NEUTROPHILS NFR BLD: 47.8 % (ref 44–72)
NRBC # BLD AUTO: 0 K/UL
NRBC BLD-RTO: 0 /100 WBC
PLATELET # BLD AUTO: 224 K/UL (ref 164–446)
PMV BLD AUTO: 9.6 FL (ref 9–12.9)
PRODUCT TYPE UPROD: NORMAL
RBC # BLD AUTO: 3.41 M/UL (ref 4.2–5.4)
RH BLD: NORMAL
UNIT STATUS USTAT: NORMAL
WBC # BLD AUTO: 6.2 K/UL (ref 4.8–10.8)

## 2022-02-11 PROCEDURE — 86901 BLOOD TYPING SEROLOGIC RH(D): CPT

## 2022-02-11 PROCEDURE — P9016 RBC LEUKOCYTES REDUCED: HCPCS

## 2022-02-11 PROCEDURE — 36430 TRANSFUSION BLD/BLD COMPNT: CPT

## 2022-02-11 PROCEDURE — 86923 COMPATIBILITY TEST ELECTRIC: CPT

## 2022-02-11 PROCEDURE — 99285 EMERGENCY DEPT VISIT HI MDM: CPT

## 2022-02-11 PROCEDURE — 96372 THER/PROPH/DIAG INJ SC/IM: CPT

## 2022-02-11 PROCEDURE — 86850 RBC ANTIBODY SCREEN: CPT

## 2022-02-11 PROCEDURE — 700111 HCHG RX REV CODE 636 W/ 250 OVERRIDE (IP): Performed by: OBSTETRICS & GYNECOLOGY

## 2022-02-11 RX ORDER — MEDROXYPROGESTERONE ACETATE 150 MG/ML
150 INJECTION, SUSPENSION INTRAMUSCULAR ONCE
Status: COMPLETED | OUTPATIENT
Start: 2022-02-11 | End: 2022-02-11

## 2022-02-11 RX ADMIN — MEDROXYPROGESTERONE ACETATE 150 MG: 150 INJECTION, SUSPENSION, EXTENDED RELEASE INTRAMUSCULAR at 05:05

## 2022-02-11 ASSESSMENT — ENCOUNTER SYMPTOMS
FEVER: 0
NECK PAIN: 0
LOSS OF CONSCIOUSNESS: 0
PALPITATIONS: 1
DOUBLE VISION: 0
CHILLS: 0
SORE THROAT: 0
FALLS: 0
BLURRED VISION: 0
HEADACHES: 0
SHORTNESS OF BREATH: 0
COUGH: 0
ABDOMINAL PAIN: 1
VOMITING: 0
NAUSEA: 0

## 2022-02-11 ASSESSMENT — FIBROSIS 4 INDEX: FIB4 SCORE: 1.77

## 2022-02-11 NOTE — ED PROVIDER NOTES
ED Provider Note    CHIEF COMPLAINT  Chief Complaint   Patient presents with   • Sent by MD     sent to ER for blood transfusion, had blood work done that showed low hgb level - per pt hgb of 4; pt has been lighheaded for past month, heart racing at times        HPI  Jennie Morales is a 41 y.o. female who presents with a chief complaint of anemia.  Patient reports she has been diagnosed with anemia since she was 13 years of age.  She has had heavy vaginal bleeding during her menstrual cycles throughout her entire life.  She did have a fibroid uterus but underwent surgery several years ago for this.  She attempted to establish with a primary care physician today and routine labs demonstrated anemia once again.  Her primary care physician encouraged her to come to the ER for a blood transfusion.  She does not have a gynecologist with whom she follows.  She is currently menstruating.  She has no dizziness or weakness.  She denies any syncope.  She has occasional heart palpitations but denies any chest pain or shortness of breath.    REVIEW OF SYSTEMS  See HPI for further details.  Vaginal bleeding.  Anemia.  All other systems are negative.     PAST MEDICAL HISTORY   has a past medical history of Anemia, Fibroids, Hyperlipidemia, and Urinary incontinence.    SOCIAL HISTORY  Social History     Tobacco Use   • Smoking status: Never Smoker   • Smokeless tobacco: Never Used   Substance and Sexual Activity   • Alcohol use: Yes     Alcohol/week: 0.0 oz     Comment: occ   • Drug use: No   • Sexual activity: Yes     Partners: Male     Comment: none        SURGICAL HISTORY   has a past surgical history that includes gyn surgery.    CURRENT MEDICATIONS  Home Medications     Reviewed by Erica Leija R.N. (Registered Nurse) on 02/10/22 at 1751  Med List Status: Not Addressed   Medication Last Dose Status   Ferrous Sulfate (IRON) 325 (65 Fe) MG Tab  Active   Mefenamic Acid 250 MG Cap  Active   multivitamin (THERAGRAN)  "Tab  Active   naproxen (NAPROSYN) 500 MG Tab  Active   pyridoxine (VITAMIN B-6) 50 MG Tab  Active                ALLERGIES  No Known Allergies    PHYSICAL EXAM  VITAL SIGNS: /83   Pulse 97   Temp 36.4 °C (97.5 °F) (Temporal)   Resp 16   Ht 1.702 m (5' 7\")   Wt 71 kg (156 lb 8.4 oz)   SpO2 100%   BMI 24.52 kg/m²    Pulse ox interpretation: I interpret this pulse ox as normal.  Constitutional: Alert in no apparent distress.  HENT: No signs of trauma, Bilateral external ears normal, Nose normal.  Moist mucous membranes.  Eyes: Pupils are equal and reactive, Conjunctiva normal, Non-icteric.   Neck: Normal range of motion, No tenderness, Supple, No stridor.   Lymphatic: No lymphadenopathy noted.   Cardiovascular: Regular rate and rhythm, no murmurs. Pulses symmetrical.  Thorax & Lungs: Normal breath sounds, No respiratory distress, No wheezing, No chest tenderness.   Abdomen: Bowel sounds normal, Soft, No tenderness, No masses, No pulsatile masses. No peritoneal signs.  : Normal external female genitalia.  Significant amount of blood in the vaginal vault with continued moderate bleeding after initial evacuation.  Skin: Warm, Dry, No erythema, No rash.   Back: Normal alignment  Extremities: No cyanosis.  Musculoskeletal: No major deformities noted.   Neurologic: Alert, No focal deficits noted.   Psychiatric: Affect normal, Judgment normal, Mood normal.     DIAGNOSTIC STUDIES / PROCEDURES    LABS  Results for orders placed or performed during the hospital encounter of 02/10/22   COD - Adult (Type and Screen)   Result Value Ref Range    ABO Grouping Only O     Rh Grouping Only NEG     Antibody Screen-Cod NEG     Component R       R99                 Red Cells, LR       [id number]   transfused   02/10/22   21:31      Product Type R99     Dispense Status transfused     Unit Number (Barcoded) [id number]     Product Code (Barcoded) J1307W37     Blood Type (Barcoded) 9500    BETA-HCG QUALITATIVE SERUM "   Result Value Ref Range    Beta-Hcg Qualitative Serum Negative Negative   CBC WITH DIFFERENTIAL   Result Value Ref Range    WBC 7.0 4.8 - 10.8 K/uL    RBC 3.64 (L) 4.20 - 5.40 M/uL    Hemoglobin 6.3 (L) 12.0 - 16.0 g/dL    Hematocrit 24.2 (L) 37.0 - 47.0 %    MCV 66.5 (L) 81.4 - 97.8 fL    MCH 17.3 (L) 27.0 - 33.0 pg    MCHC 26.0 (L) 33.6 - 35.0 g/dL    RDW 50.3 (H) 35.9 - 50.0 fL    Platelet Count 290 164 - 446 K/uL    MPV 9.3 9.0 - 12.9 fL    Neutrophils-Polys 58.60 44.00 - 72.00 %    Lymphocytes 36.10 22.00 - 41.00 %    Monocytes 3.90 0.00 - 13.40 %    Eosinophils 0.70 0.00 - 6.90 %    Basophils 0.60 0.00 - 1.80 %    Immature Granulocytes 0.10 0.00 - 0.90 %    Nucleated RBC 0.00 /100 WBC    Neutrophils (Absolute) 4.09 2.00 - 7.15 K/uL    Lymphs (Absolute) 2.52 1.00 - 4.80 K/uL    Monos (Absolute) 0.27 0.00 - 0.85 K/uL    Eos (Absolute) 0.05 0.00 - 0.51 K/uL    Baso (Absolute) 0.04 0.00 - 0.12 K/uL    Immature Granulocytes (abs) 0.01 0.00 - 0.11 K/uL    NRBC (Absolute) 0.00 K/uL   Basic Metabolic Panel   Result Value Ref Range    Sodium 135 135 - 145 mmol/L    Potassium 3.2 (L) 3.6 - 5.5 mmol/L    Chloride 104 96 - 112 mmol/L    Co2 23 20 - 33 mmol/L    Glucose 139 (H) 65 - 99 mg/dL    Bun 14 8 - 22 mg/dL    Creatinine 0.82 0.50 - 1.40 mg/dL    Calcium 9.0 8.4 - 10.2 mg/dL    Anion Gap 8.0 7.0 - 16.0   PT/INR   Result Value Ref Range    PT 11.9 (L) 12.0 - 14.6 sec    INR 0.95 0.87 - 1.13   PTT   Result Value Ref Range    APTT 27.5 24.7 - 36.0 sec   CBC WITH DIFFERENTIAL   Result Value Ref Range    WBC 6.2 4.8 - 10.8 K/uL    RBC 3.41 (L) 4.20 - 5.40 M/uL    Hemoglobin 6.2 (L) 12.0 - 16.0 g/dL    Hematocrit 23.0 (L) 37.0 - 47.0 %    MCV 67.4 (L) 81.4 - 97.8 fL    MCH 18.2 (L) 27.0 - 33.0 pg    MCHC 27.0 (L) 33.6 - 35.0 g/dL    RDW 50.8 (H) 35.9 - 50.0 fL    Platelet Count 224 164 - 446 K/uL    MPV 9.6 9.0 - 12.9 fL    Neutrophils-Polys 47.80 44.00 - 72.00 %    Lymphocytes 44.20 (H) 22.00 - 41.00 %    Monocytes 5.90  0.00 - 13.40 %    Eosinophils 1.10 0.00 - 6.90 %    Basophils 0.80 0.00 - 1.80 %    Immature Granulocytes 0.20 0.00 - 0.90 %    Nucleated RBC 0.00 /100 WBC    Neutrophils (Absolute) 2.94 2.00 - 7.15 K/uL    Lymphs (Absolute) 2.72 1.00 - 4.80 K/uL    Monos (Absolute) 0.36 0.00 - 0.85 K/uL    Eos (Absolute) 0.07 0.00 - 0.51 K/uL    Baso (Absolute) 0.05 0.00 - 0.12 K/uL    Immature Granulocytes (abs) 0.01 0.00 - 0.11 K/uL    NRBC (Absolute) 0.00 K/uL   ESTIMATED GFR   Result Value Ref Range    GFR If African American >60 >60 mL/min/1.73 m 2    GFR If Non African American >60 >60 mL/min/1.73 m 2     RADIOLOGY  US-PELVIC COMPLETE (TRANSABDOMINAL/TRANSVAGINAL) (COMBO)   Final Result         1.  Multiloculated cystic lesion in the left ovary, indeterminate, recommend follow-up pelvic sonography in 6 weeks for evaluation of stability and further characterization.   2.  Multiple heterogeneous myometrial masses, appearance favors uterine fibroids.   3.  Echogenic lesion along the endometrial stripe, could represent submucosal fibroid or large endometrial polyp. Could be further evaluated with hysterosonography.   4.  Nabothian cysts          COURSE & MEDICAL DECISION MAKING  Pertinent Labs & Imaging studies reviewed. (See chart for details)  Records obtained and reviewed: Patient was seen in a primary care office today to establish care and discuss prior history of hypertension.  She does have a history of menometrorrhagia.  Patient reported heavy vaginal bleeding during menstrual cycles with history of fibroids that were removed.  A pelvic ultrasound was ordered and she was referred to gynecology to establish care.    Patient was seen in this emergency department in 2017 for anemia with dizziness and generalized weakness in the setting of reported heavy vaginal bleeding.  At that time her hemoglobin was 6.1.  She was not hypotensive or tachycardic.  Patient was found to have low iron with microcytic anemia and was encouraged  to take iron supplements.  She was given a blood transfusion.  She had only faint vaginal spotting at that time without active hemorrhage.  Gynecology was contacted who felt that the patient should follow-up promptly as an outpatient.  She was ultimately discharged to follow-up.    This is a 41-year-old G1, P1 who was sent here by her primary care physician with heavy vaginal bleeding and anemia requesting a blood transfusion.  Patient has required a blood transfusion in 2017 for similar symptoms.  She arrives afebrile with normal vital signs.  She appears well-hydrated and nontoxic.  Abdomen is soft without tenderness.  She does have significant blood in the vaginal vault.  CBC from earlier today does reveal a hemoglobin that is 6.2.  Repeated here and it was 6.3.  She was given a unit of packed red blood cells.  She has normal platelets.  She denies any melena or hematochezia, hematemesis, or other sources of bleeding.  She does report she has had anemia for many years.  Metabolic panel reveals hypokalemia to 3.2 which was repleted orally.  She has normal renal function.  Blood sugar is elevated to 139.    An ultrasound of the pelvis demonstrated a multiloculated cystic lesion in the left ovary with a recommendation for follow-up ultrasound in 6 weeks.  Also noted were multiple masses favoring uterine fibroids and possible submucosal fibroid or large endometrial polyp.    Repeat CBC was drawn after the patient received 1 unit of packed red blood cells and unfortunately her hemoglobin has decreased.  I do feel she will require transfer to our main facility for gynecology as she will likely need additional transfusions and possible gynecologic surgical intervention.    I contacted the [facility], Dr. Martinez, who has accepted the patient in transfer.  Patient was given the opportunity to speak with our registration personnel to discuss insurance and potential costs of the transfer and has  elected to be transferred via ambulance as recommended.  She was subsequently transferred via EMS in guarded condition.    FINAL IMPRESSION  1.  Anemia  2.  Vaginal bleeding    Electronically signed by: Lei Matthews M.D., 2/10/2022 6:17 PM

## 2022-02-11 NOTE — ED NOTES
Assisted OBGYN to set up for pelvic exam. Pt ambulated to and from bathroom without assistance before exam.

## 2022-02-11 NOTE — ED NOTES
Assumed care of pt-in no apparent distress. Saline lock with blood draw. Aware of pending u/s. Remains npo, call light in reach

## 2022-02-11 NOTE — ED PROVIDER NOTES
ED Provider Note    Chief Complaint:   Vaginal bleeding    HPI:  Jennie Morales is a very pleasant 41-year-old female who presents as a transfer from [facility] for significant vaginal bleeding and anemia requiring transfusion.  Patient has a distant history of fibroid surgery 11 years ago and heavy bleeding.  Patient reports that her menstrual cycles are typically heavy and that she started her cycle yesterday and has been using 7 pads daily for bleeding.  Patient denies lightheadedness, shortness of breath, chest pain but does endorse intermittent palpitations.  Patient does endorse mild abdominal discomfort.    Review of Systems:  Review of Systems   Constitutional: Negative for chills and fever.   HENT: Negative for congestion and sore throat.    Eyes: Negative for blurred vision and double vision.   Respiratory: Negative for cough and shortness of breath.    Cardiovascular: Positive for palpitations. Negative for chest pain and leg swelling.   Gastrointestinal: Positive for abdominal pain. Negative for nausea and vomiting.   Genitourinary: Negative for dysuria and hematuria.   Musculoskeletal: Negative for falls and neck pain.   Skin: Negative for itching and rash.   Neurological: Negative for loss of consciousness and headaches.       Past Medical History:   has a past medical history of Anemia, Fibroids, Hyperlipidemia, and Urinary incontinence.    Social History:  Social History     Tobacco Use   • Smoking status: Never Smoker   • Smokeless tobacco: Never Used   Vaping Use   • Vaping Use: Never used   Substance and Sexual Activity   • Alcohol use: Yes     Alcohol/week: 0.0 oz     Comment: occ   • Drug use: No   • Sexual activity: Yes     Partners: Male     Comment: none        Surgical History:   has a past surgical history that includes gyn surgery.    Allergies:  No Known Allergies    Physical Exam:  Vital Signs: /59   Pulse 64   Temp 36.5 °C (97.7 °F) (Temporal)   Resp 16   " Ht 1.753 m (5' 9\")   Wt 68 kg (150 lb)   SpO2 97%   BMI 22.15 kg/m²   Physical Exam  Vitals and nursing note reviewed.   Constitutional:       Comments: Patient is lying in bed supine, pleasant, conversant, speaking in complete sentences   HENT:      Head: Normocephalic and atraumatic.   Eyes:      Extraocular Movements: Extraocular movements intact.      Conjunctiva/sclera: Conjunctivae normal.      Pupils: Pupils are equal, round, and reactive to light.   Cardiovascular:      Pulses: Normal pulses.      Comments: HR 87  Pulmonary:      Effort: Pulmonary effort is normal. No respiratory distress.   Abdominal:      Comments: Abdomen is soft, mild surgery tenderness to palpation, no rigidity, fibroid uterus palpated in the suprapubic area   Musculoskeletal:         General: No swelling. Normal range of motion.      Cervical back: Normal range of motion. No rigidity.   Skin:     General: Skin is warm and dry.      Capillary Refill: Capillary refill takes less than 2 seconds.   Neurological:      Mental Status: She is alert.         Medical records reviewed for continuity of care.     Results for orders placed or performed during the hospital encounter of 02/11/22   COD - Adult (Type and Screen)   Result Value Ref Range    ABO Grouping Only O     Rh Grouping Only NEG     Antibody Screen-Cod NEG    COMPONENT CELLULAR   Result Value Ref Range    Component R       R99                 Red Cells, LR       [id number]   issued       02/11/22   06:00      Product Type R99     Dispense Status issued     Unit Number (Barcoded) [id number]     Product Code (Barcoded) Q3163P68     Blood Type (Barcoded) 9500        Radiology:  No orders to display        MDM:  Pelvic ultrasound demonstrates multiloculated cystic lesion left ovary recommended follow-up pelvic sonography in 6 weeks.  Patient also has multiple heterogenous myometrial masses which are likely fibroids, nabothian cysts are present.  Patient's hemoglobin has " declined despite 1 unit PRBC transfusion.  Significant amount of blood was identified in the vaginal vault per previous emergency provider.  Patient was referred to this facility for gynecology evaluation.  Disposition pending gynecology consultation.    Electronically signed by: Jose Martinez M.D., 2/11/2022, 2:30 AM    Dr. Bai of gynecology has been kind enough to come to the patient's bedside and evaluate the patient.  She recommended D Provera which has been administered to the patient and she also recommends 1 more unit of PRBCs.  She also has examined the patient and does not believe she is actively bleeding at this time.  Patient will follow up outpatient with gynecology.  Patient at this time is requesting to go home and I explained to her that her blood needs to be finished prior to discharge, patient is amenable to this plan, patient is resting comfortably in bed at this time without any symptoms.    This dictation has been created using voice recognition software. I am continuously working with the software to minimize the number of voice recognition errors and I have made every attempt to manually correct the errors within my dictation. However errors  related to this voice recognition software may still exist and should be interpreted within the appropriate context.     Electronically signed by: Jose Martinez M.D., 2/11/2022 7:00 AM      Disposition:  Home    Final Impression:  1. Vaginal bleeding    2. Acute anemia    3. Fibroid    4. Cyst of left ovary

## 2022-02-11 NOTE — CONSULTS
DATE OF SERVICE:  2022     HISTORY OF PRESENT ILLNESS:  The patient is a 41-year-old , last   menstrual period started yesterday with known large fibroid uterus and   persistent anemia for many years, presents as she was transferred from [facility] for a GYN consultation.  The patient had lost her insurance,   so has not been seen in about 5 years and established care with her primary   care physician yesterday who got some lab work done on her.  She was shown to   be anemic and was told to go into the ER, so she did.  She received 1 unit of   packed red blood cells there and again there was no GYN consultation there, so   she was transferred here .  She reports she is on day 2 of her normal   cycle.  She says the flow for her is manageable and not very heavy.  Today,   she has soaked through a tampon and pad about every 3 hours or so, which is   typical for her.  She reports her cycles usually last about 5 days.  Her   uterus was large and multiple fibroids noted.  Again, even as of 5 years ago   she was recommended to have a hysterectomy, but was resistant to do so.  She   has not been on any birth control ever in her life.  She is still uncertain   whether she thinks she wants to try to have the baby, so has always been   resistant to hysterectomy.  She has no abnormal Paps or STDs.  No other issues   at this point in time.     PAST MEDICAL HISTORY:  Noncontributory except for her persistent anemia and   fibroid uterus.     PAST SURGICAL HISTORY:  Hysteroscopy after her son was born for fibroid   removal, she reports.     ALLERGIES:  None.     CURRENT MEDICATIONS:  Iron supplements.     SOCIAL HISTORY:  She is .  Denies any tobacco or IV drug use.  Has   occasional alcohol use.     OBSTETRICAL HISTORY:  She has had 1 vaginal delivery 16 years ago.     FAMILY HISTORY:  Noncontributory.  Denies sickle cell anemia.     PHYSICAL EXAMINATION:  VITAL SIGNS:  Stable.  She is afebrile.   Oxygen saturations 99% on room air,   weight 150 pounds.  GENERAL:  She is pleasant, awake, alert, oriented, no acute distress.  CARDIOVASCULAR:  Regular rate and rhythm.  CHEST:  Clear to auscultation bilaterally.  ABDOMEN:  Soft, nontender, nondistended.  She has a palpable fibroid uterus   that is enlarged.  EXTREMITIES:  No cyanosis, clubbing or edema.  PELVIC:  Sterile speculum exam was performed.  She is having no significant   active bleeding.  I could soak one Q-tip up as all at this moment.  Again, she   reports she is on day 2 of her cycle, but it is not heavy for her.  Uterus is   globular and wide almost hip to hip about 12-15 week size.  Cervix is   posterior.  No lesions are seen and nontender.     LABORATORY DATA:  Hemoglobin 6.2, hematocrit 23.0, platelet count 224,000.    This was after 1 unit of packed red blood cells at [facility].  She had a   normal TSH yesterday that was drawn.  Vitamin D of 25.  Beta hCG 0.  She is O   negative.  She had a pelvic ultrasound, which shows uterus 9.87 x 10.32 x   10.85, multiple heterogeneous myometrial lesions seen measuring up to 6.1 x   5.1 x 4.8 cm, endometrial echo complex not well visualized. Echogenic   structure seen along the endometrial stripe measuring 3 x 2 x 3 cm and   nabothian cysts are seen.  She has no free fluid.  Right ovary appears normal.    Left ovary, she was 1.1 cm echogenic multiloculated cystic lesion in the   left ovary.     ASSESSMENT AND PLAN:  A 41-year-old female, , last menstrual period   started on 2022 with enlarged fibroid uterus, menorrhagia and anemia due   to fibroid uterus, chronic. The patient has never been on birth control, but   is amenable to start on something until we can do a complete workup for her.   She will need an endometrial biopsy, which we believe can be performed   outpatient, but she is amenable to start on birth control.  We discussed her   options for birth control to control her cycle until  then and she is agreeable   to receive a shot of Depo-Provera today.  She understands that she may not   likely go amenorrheic with this shot until she has received several.  She also   understands that once she is given it she may have some irregular bleeding   and spotting outside of her cycle.  Other options were discussed with her and   she would like to proceed with a Depo-Provera shot. She may need one more unit   of packed red blood cells here before she leaves just to get her hemoglobin   up to at least 8.  We did discuss options for hysterectomy, but she is not   wanting to proceed with that at this time.  I believe that if she does need a   hysterectomy.  She is going to need a robotic-assisted hysterectomy due to the   width and girth of this fibroid uterus.  I advised the patient she can follow   up in my office or she can follow up with the GYN consult that was placed   yesterday by her primary care physician, but it was strongly encouraged her to   continue the Depo-Provera every 3 months until the workup is completed and   she has been a decision of what are to be done to control her bleeding, so   that she is not so anemic.  I do not feel she warrants admission at this time.        ______________________________  MD EDITA ANTHONY/DANYEL/MICHAEL    DD:  02/11/2022 04:10  DT:  02/11/2022 05:06    Job#:  [id number]

## 2022-02-11 NOTE — ED NOTES
Spoke with patient regarding the blood transfusion as she had concerns before proceeding. Pts concerns were discussed and all questions answered for the pt. Discussed the blood transfusion process. Pt agreeable to receiving the blood transfusion. Form signed and in chart.

## 2022-02-11 NOTE — ED TRIAGE NOTES
"Chief Complaint   Patient presents with   • Sent by MD     sent to ER for blood transfusion, had blood work done that showed low hgb level - per pt hgb of 4; pt has been lighheaded for past month, heart racing at times      /83   Pulse 97   Temp 36.4 °C (97.5 °F) (Temporal)   Resp 16   Ht 1.702 m (5' 7\")   Wt 71 kg (156 lb 8.4 oz)   SpO2 100%   BMI 24.52 kg/m²     Pt arrived w/ above concern, per chart review Hgb 6.2/ Hct 23.8 as of 2/10/22    Has this patient been vaccinated for COVID - YES, partial     "

## 2022-02-11 NOTE — PROGRESS NOTES
Consult dictated- see full dictation.     No heavy active bleeding at this time seen on exam.     Rec she get at least one more unit PRBC to get her blood count over 8 before discharge since she is on her cycle.      Pt agreeable to depo provera - please give her 150mg Im prior to discharge home.     She understands she will still need follow up outpatient with gynecology -she can f/u with gyn consult that was sent yesterday from her pcp or in my office. She will need an endometrial biopsy and continued management for this large fibroid uterus.    If she would be agreeable to hysterectomy for definitive management which she is not at this time - I'd rec she see a gyn who does robotic surgery due to the girth/width of the fibroid uterus encompasses her entire pelvis. As well as she will need follow up for the ovarian cyst that is present.     She understands and expresses she will seek follow up and make it a priority.

## 2022-02-11 NOTE — ED TRIAGE NOTES
"Chief Complaint   Patient presents with   • Vaginal Bleeding     Pt began period last night 2/10 with significant bleeding and headaches.       Pt BIB EMS from [facility] as a transfer for vaginal bleeding and anemia requiring 1u prbc's.     /76   Pulse 87   Temp 36.7 °C (98.1 °F) (Temporal)   Resp 18   Ht 1.753 m (5' 9\")   Wt 68 kg (150 lb)   SpO2 99%   BMI 22.15 kg/m²       "
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
## 2022-03-01 PROBLEM — D50.9 MICROCYTIC ANEMIA: Status: ACTIVE | Noted: 2022-02-07

## 2022-03-02 ENCOUNTER — TELEPHONE (OUTPATIENT)
Dept: MEDICAL GROUP | Facility: MEDICAL CENTER | Age: 42
End: 2022-03-02

## 2022-03-02 PROBLEM — N83.202 CYST OF LEFT OVARY: Status: ACTIVE | Noted: 2022-03-02

## 2022-03-02 NOTE — TELEPHONE ENCOUNTER
Phone Number Called: [phone] (home)       Call outcome: Spoke to patient regarding message below.    Message: PT forgot appt rescheduled for 03/16 @830am TS

## 2022-03-15 NOTE — PROGRESS NOTES
Subjective:     Chief Complaint   Patient presents with   • Lab Results     Diagnoses of Microcytic anemia, Menometrorrhagia, Health care maintenance, Encounter for screening mammogram for breast cancer, Dyslipidemia, and Hair loss were pertinent to this visit.      HPI: Jennie is a pleasant 41 y.o. female who presents today for follow up on her anemia and discuss hair loss    Problem   Hair Loss    This is a chronic problem, patient has been losing hair for a few months now.  She used to wear a brace, however they have been off for the last 3 months.  Hair loss is diffuse, no patches, no erythema of the scalp.  Patient is taking multivitamins today she has severe anemia presumably due to iron deficiency, that required blood transfusion.  TSH was within normal limits.     Microcytic Anemia    Lab Results   Component Value Date/Time    WBC 6.2 02/10/2022 11:53 PM    RBC 3.41 (L) 02/10/2022 11:53 PM    HEMOGLOBIN 6.2 (L) 02/10/2022 11:53 PM    HEMATOCRIT 23.0 (L) 02/10/2022 11:53 PM    MCV 67.4 (L) 02/10/2022 11:53 PM    MCH 18.2 (L) 02/10/2022 11:53 PM    MCHC 27.0 (L) 02/10/2022 11:53 PM    MPV 9.6 02/10/2022 11:53 PM   Patient has not had a standing history of microcytic anemia, was attributed to menometrorrhagia.   Was called with critical lab results and was directed to emergency room.  In the emergency room she was evaluated by gynecology, received 2 units of blood transfusion and was given an injection of Depo-Provera.     Dyslipidemia    This is a chronic condition, patient is not on medication at this time.  Established diagnosis.   Lab Results   Component Value Date/Time    CHOLSTRLTOT 230 (H) 02/10/2022 11:29 AM    TRIGLYCERIDE 66 02/10/2022 11:29 AM    HDL 86 02/10/2022 11:29 AM     (H) 02/10/2022 11:29 AM    Patient reports significant family history of early heart attacks in her first-degree relatives.       Health Care Maintenance    Mammogram: long time ago, order provided  Pap smear: 2017,  "pending appt with Gyn  Influenza: through work in December 2021  Tdap: had in last few years         Past Medical History:   Diagnosis Date   • Anemia    • Fibroids    • Hyperlipidemia    • Urinary incontinence      Past Surgical History:   Procedure Laterality Date   • GYN SURGERY      fibroids     Family History   Problem Relation Age of Onset   • Diabetes Father    • Hyperlipidemia Father    • Hyperlipidemia Mother    • Psychiatric Illness Mother    • Cancer Sister         cervical cance   • Cancer Maternal Grandmother    • Hyperlipidemia Sister    • Psychiatric Illness Sister    • Heart Disease Sister    • Heart Disease Brother    • Thyroid Neg Hx      Social History     Tobacco Use   • Smoking status: Never Smoker   • Smokeless tobacco: Never Used   Vaping Use   • Vaping Use: Never used   Substance Use Topics   • Alcohol use: Yes     Alcohol/week: 0.0 oz     Comment: occ   • Drug use: No       Current Outpatient Medications Ordered in Epic   Medication Sig Dispense Refill   • VITAMIN D PO Take  by mouth.     • rosuvastatin (CRESTOR) 10 MG Tab Take 1 Tablet by mouth every evening. 90 Tablet 1   • multivitamin (THERAGRAN) Tab Take 1 Tablet by mouth every day.       No current [facility]-ordered facility-administered medications on file.     Review of Systems   Constitutional: Negative for chills, fever and weight loss.   HENT: Negative for sore throat.    Respiratory: Negative for cough and shortness of breath.    Cardiovascular: Positive for palpitations. Negative for chest pain, orthopnea, claudication and leg swelling.   Gastrointestinal: Negative for nausea and vomiting.   Genitourinary: Negative for dysuria.   Endo/Heme/Allergies: Negative for polydipsia.     Objective:     Exam:  /78 (BP Location: Left arm, Patient Position: Sitting, BP Cuff Size: Adult)   Pulse 78   Temp 36.6 °C (97.8 °F) (Temporal)   Resp 20   Ht 1.71 m (5' 7.32\")   Wt 69.8 kg (153 lb 14.1 oz)   SpO2 100%   BMI 23.87 kg/m²  Body " mass index is 23.87 kg/m².    Physical Exam  Vitals reviewed.   Constitutional:       Appearance: Normal appearance.   HENT:      Mouth/Throat:      Mouth: Mucous membranes are moist.      Pharynx: Oropharynx is clear.   Eyes:      General: No scleral icterus.  Cardiovascular:      Rate and Rhythm: Normal rate and regular rhythm.      Pulses: Normal pulses.      Heart sounds: Normal heart sounds.   Pulmonary:      Effort: Pulmonary effort is normal. No respiratory distress.      Breath sounds: Normal breath sounds. No wheezing or rales.   Skin:     General: Skin is warm and dry.      Comments: Diffuse hair loss of the scalp, no patches, no erythema   Neurological:      Mental Status: She is alert.       Labs: Reviewed and discussed results of CBC, CMP, lipid profile February 10, 2022.    Assessment & Plan:   Jennie  is a pleasant 41 y.o. female with the following -     Problem List Items Addressed This Visit     Dyslipidemia     Due to patient's significant family history of early heart attacks, start Crestor.         Relevant Medications    rosuvastatin (CRESTOR) 10 MG Tab    Hair loss     Likely secondary to dietary deficiency: We will check iron levels, vitamin B12, folic acid.  TSH was within normal limits.  Consider referral to dermatology if no clear reason identified.         Health care maintenance    Menometrorrhagia    Microcytic anemia     Patient states, that her dizziness have improved after she received blood transfusion in ED, still having some palpitations but no chest pain or shortness of breath.  She has pending appointment with gynecology to discuss hysterectomy due to her menometrorrhagia.  Patient is not aware of family history of thalassemia, however she states that both her mother and sister severe anemia.   Repeat CBC with differential, check ferritin, iron profile, hemoglobin electrophoresis and reticulocyte count           Relevant Orders    IRON/TOTAL IRON BIND    FERRITIN    TRANSFERRIN     RETICULOCYTES COUNT    CBC WITH DIFFERENTIAL    HEMOGLOBINOPATHY PROFILE    VITAMIN B12    FOLATE      Other Visit Diagnoses     Encounter for screening mammogram for breast cancer        Relevant Orders    MA-SCREENING MAMMO BILAT W/TOMOSYNTHESIS W/CAD        Return in about 4 weeks (around 4/13/2022) for follow up on lab results.    Please note that this dictation was created using voice recognition software. I have made every reasonable attempt to correct obvious errors, but I expect that there are errors of grammar and possibly content that I did not discover before finalizing the note.

## 2022-03-16 ENCOUNTER — HOSPITAL ENCOUNTER (OUTPATIENT)
Dept: LAB | Facility: MEDICAL CENTER | Age: 42
End: 2022-03-16
Attending: INTERNAL MEDICINE
Payer: COMMERCIAL

## 2022-03-16 ENCOUNTER — OFFICE VISIT (OUTPATIENT)
Dept: MEDICAL GROUP | Facility: MEDICAL CENTER | Age: 42
End: 2022-03-16
Payer: COMMERCIAL

## 2022-03-16 VITALS
WEIGHT: 153.88 LBS | TEMPERATURE: 97.8 F | OXYGEN SATURATION: 100 % | SYSTOLIC BLOOD PRESSURE: 110 MMHG | RESPIRATION RATE: 20 BRPM | DIASTOLIC BLOOD PRESSURE: 78 MMHG | BODY MASS INDEX: 24.15 KG/M2 | HEIGHT: 67 IN | HEART RATE: 78 BPM

## 2022-03-16 DIAGNOSIS — Z12.31 ENCOUNTER FOR SCREENING MAMMOGRAM FOR BREAST CANCER: ICD-10-CM

## 2022-03-16 DIAGNOSIS — E78.5 DYSLIPIDEMIA: ICD-10-CM

## 2022-03-16 DIAGNOSIS — D50.9 MICROCYTIC ANEMIA: ICD-10-CM

## 2022-03-16 DIAGNOSIS — L65.9 HAIR LOSS: ICD-10-CM

## 2022-03-16 DIAGNOSIS — N92.1 MENOMETRORRHAGIA: ICD-10-CM

## 2022-03-16 DIAGNOSIS — Z00.00 HEALTH CARE MAINTENANCE: ICD-10-CM

## 2022-03-16 LAB
ANISOCYTOSIS BLD QL SMEAR: ABNORMAL
BASOPHILS # BLD AUTO: 0.6 % (ref 0–1.8)
BASOPHILS # BLD: 0.03 K/UL (ref 0–0.12)
COMMENT 1642: NORMAL
EOSINOPHIL # BLD AUTO: 0.02 K/UL (ref 0–0.51)
EOSINOPHIL NFR BLD: 0.4 % (ref 0–6.9)
ERYTHROCYTE [DISTWIDTH] IN BLOOD BY AUTOMATED COUNT: 59.9 FL (ref 35.9–50)
FERRITIN SERPL-MCNC: 5 NG/ML (ref 10–291)
FOLATE SERPL-MCNC: 17.6 NG/ML
HCT VFR BLD AUTO: 31.7 % (ref 37–47)
HGB BLD-MCNC: 8.8 G/DL (ref 12–16)
HGB RETIC QN AUTO: 19.9 PG/CELL (ref 29–35)
HYPOCHROMIA BLD QL SMEAR: ABNORMAL
IMM GRANULOCYTES # BLD AUTO: 0.01 K/UL (ref 0–0.11)
IMM GRANULOCYTES NFR BLD AUTO: 0.2 % (ref 0–0.9)
IMM RETICS NFR: 28.6 % (ref 9.3–17.4)
IRON SATN MFR SERPL: 4 % (ref 15–55)
IRON SERPL-MCNC: 17 UG/DL (ref 40–170)
LYMPHOCYTES # BLD AUTO: 1.69 K/UL (ref 1–4.8)
LYMPHOCYTES NFR BLD: 31.4 % (ref 22–41)
MACROCYTES BLD QL SMEAR: ABNORMAL
MCH RBC QN AUTO: 19.6 PG (ref 27–33)
MCHC RBC AUTO-ENTMCNC: 27.8 G/DL (ref 33.6–35)
MCV RBC AUTO: 70.4 FL (ref 81.4–97.8)
MICROCYTES BLD QL SMEAR: ABNORMAL
MONOCYTES # BLD AUTO: 0.23 K/UL (ref 0–0.85)
MONOCYTES NFR BLD AUTO: 4.3 % (ref 0–13.4)
NEUTROPHILS # BLD AUTO: 3.4 K/UL (ref 2–7.15)
NEUTROPHILS NFR BLD: 63.1 % (ref 44–72)
NRBC # BLD AUTO: 0 K/UL
NRBC BLD-RTO: 0 /100 WBC
OVALOCYTES BLD QL SMEAR: NORMAL
PLATELET # BLD AUTO: 300 K/UL (ref 164–446)
PLATELET BLD QL SMEAR: NORMAL
PMV BLD AUTO: 9.1 FL (ref 9–12.9)
POIKILOCYTOSIS BLD QL SMEAR: NORMAL
POLYCHROMASIA BLD QL SMEAR: NORMAL
RBC # BLD AUTO: 4.5 M/UL (ref 4.2–5.4)
RBC BLD AUTO: PRESENT
RETICS # AUTO: 0.05 M/UL (ref 0.04–0.06)
RETICS/RBC NFR: 1.1 % (ref 0.8–2.1)
TIBC SERPL-MCNC: 444 UG/DL (ref 250–450)
TRANSFERRIN SERPL-MCNC: 378 MG/DL (ref 200–370)
UIBC SERPL-MCNC: 427 UG/DL (ref 110–370)
VIT B12 SERPL-MCNC: 1019 PG/ML (ref 211–911)
WBC # BLD AUTO: 5.4 K/UL (ref 4.8–10.8)

## 2022-03-16 PROCEDURE — 82607 VITAMIN B-12: CPT

## 2022-03-16 PROCEDURE — 85046 RETICYTE/HGB CONCENTRATE: CPT

## 2022-03-16 PROCEDURE — 82746 ASSAY OF FOLIC ACID SERUM: CPT

## 2022-03-16 PROCEDURE — 85025 COMPLETE CBC W/AUTO DIFF WBC: CPT

## 2022-03-16 PROCEDURE — 83550 IRON BINDING TEST: CPT

## 2022-03-16 PROCEDURE — 99214 OFFICE O/P EST MOD 30 MIN: CPT | Performed by: INTERNAL MEDICINE

## 2022-03-16 PROCEDURE — 36415 COLL VENOUS BLD VENIPUNCTURE: CPT

## 2022-03-16 PROCEDURE — 84466 ASSAY OF TRANSFERRIN: CPT

## 2022-03-16 PROCEDURE — 83021 HEMOGLOBIN CHROMOTOGRAPHY: CPT

## 2022-03-16 PROCEDURE — 82728 ASSAY OF FERRITIN: CPT

## 2022-03-16 PROCEDURE — 83540 ASSAY OF IRON: CPT

## 2022-03-16 RX ORDER — ROSUVASTATIN CALCIUM 10 MG/1
10 TABLET, COATED ORAL EVERY EVENING
Qty: 90 TABLET | Refills: 1 | Status: SHIPPED | OUTPATIENT
Start: 2022-03-16 | End: 2022-03-17 | Stop reason: SDUPTHER

## 2022-03-16 ASSESSMENT — ENCOUNTER SYMPTOMS
CHILLS: 0
POLYDIPSIA: 0
CLAUDICATION: 0
PALPITATIONS: 1
SHORTNESS OF BREATH: 0
COUGH: 0
VOMITING: 0
ORTHOPNEA: 0
SORE THROAT: 0
FEVER: 0
NAUSEA: 0
WEIGHT LOSS: 0

## 2022-03-16 ASSESSMENT — FIBROSIS 4 INDEX: FIB4 SCORE: 1.77

## 2022-03-16 NOTE — ASSESSMENT & PLAN NOTE
HISTORY: Status post fall, rule out subdural



COMPARISONS: March 08, 2018



TECHNIQUE: Multiple contiguous axial CT scans were obtained of the head without 

intravenous contrast. 



FINDINGS: 

HEMORRHAGE/INFARCT: There is patchy hypoattenuation of the right inferior cerebellum that

is not clearly seen on the previous examination. This may indicate subacute hemorrhagic

infarct. Elsewhere, there is no hemorrhage or acute infarct.

MASSES/SHIFT: There is no mass or shift.



EXTRA-AXIAL SPACES: There are no extra-axial fluid collections.

SULCI AND VENTRICLES: The sulci and ventricles are normal in size and position for the

patient's stated age.



CEREBRUM: There are no focal parenchymal abnormalities.

BRAINSTEM: There are no focal parenchymal abnormalities.

CEREBELLUM: There are no focal parenchymal abnormalities.



VESSELS: The vessels are grossly normal.

PARANASAL SINUSES: There are-fluid levels within the maxillary sinuses bilaterally.

ORBITS: The orbits are unremarkable.

BONES AND SOFT TISSUE: No bone or soft tissue abnormalities are noted.



OTHER: None



IMPRESSION: 

1.  THERE HAS BEEN INTERVAL DEVELOPMENT OF PATCHY HYPOATTENUATION OF THE RIGHT INFERIOR

CEREBELLUM SUGGESTIVE OF SUBACUTE NONHEMORRHAGIC INFARCT IN THE CORRECT CLINICAL SETTING.

2.  THERE ARE AIR-FLUID LEVELS WITHIN THE MAXILLARY SINUSES BILATERALLY, THIS MAY INDICATE

ACUTE SINUSITIS IN THE CORRECT CLINICAL SETTING.

3.  NO EXTRA-AXIAL FLUID COLLECTIONS. Likely secondary to dietary deficiency: We will check iron levels, vitamin B12, folic acid.  TSH was within normal limits.  Consider referral to dermatology if no clear reason identified.

## 2022-03-16 NOTE — ASSESSMENT & PLAN NOTE
Patient states, that her dizziness have improved after she received blood transfusion in ED, still having some palpitations but no chest pain or shortness of breath.  She has pending appointment with gynecology to discuss hysterectomy due to her menometrorrhagia.  Patient is not aware of family history of thalassemia, however she states that both her mother and sister severe anemia.   Repeat CBC with differential, check ferritin, iron profile, hemoglobin electrophoresis and reticulocyte count

## 2022-03-17 DIAGNOSIS — E78.5 DYSLIPIDEMIA: ICD-10-CM

## 2022-03-17 DIAGNOSIS — D50.9 MICROCYTIC ANEMIA: ICD-10-CM

## 2022-03-17 RX ORDER — FERROUS SULFATE 325(65) MG
325 TABLET ORAL DAILY
Qty: 90 TABLET | Refills: 1 | Status: SHIPPED | OUTPATIENT
Start: 2022-03-17 | End: 2022-11-02

## 2022-03-17 RX ORDER — ROSUVASTATIN CALCIUM 10 MG/1
10 TABLET, COATED ORAL EVERY EVENING
Qty: 90 TABLET | Refills: 1 | Status: SHIPPED | OUTPATIENT
Start: 2022-03-17 | End: 2022-09-19

## 2022-03-17 NOTE — TELEPHONE ENCOUNTER
Received request via: Pharmacy    Was the patient seen in the last year in this department? Yes    Does the patient have an active prescription (recently filled or refills available) for medication(s) requested? No     Requested Prescriptions     Pending Prescriptions Disp Refills   • rosuvastatin (CRESTOR) 10 MG Tab 90 Tablet 1     Sig: Take 1 Tablet by mouth every evening.

## 2022-03-18 LAB
HGB A1 MFR BLD: 97.1 % (ref 95–97.9)
HGB A2 MFR BLD: 2.5 % (ref 2–3.5)
HGB C MFR BLD: 0 % (ref 0–0)
HGB E MFR BLD: 0 % (ref 0–0)
HGB F MFR BLD: 0.4 % (ref 0–2.1)
HGB FRACT BLD ELPH-IMP: NORMAL
HGB OTHER MFR BLD: 0 % (ref 0–0)
HGB S BLD QL SOLY: NORMAL
HGB S MFR BLD: 0 % (ref 0–0)
PATH INTERP BLD-IMP: NORMAL

## 2022-03-24 ENCOUNTER — HOSPITAL ENCOUNTER (OUTPATIENT)
Dept: RADIOLOGY | Facility: MEDICAL CENTER | Age: 42
End: 2022-03-24
Attending: INTERNAL MEDICINE
Payer: COMMERCIAL

## 2022-03-24 DIAGNOSIS — N92.1 MENOMETRORRHAGIA: ICD-10-CM

## 2022-03-24 PROCEDURE — 76830 TRANSVAGINAL US NON-OB: CPT

## 2022-04-14 ENCOUNTER — HOSPITAL ENCOUNTER (OUTPATIENT)
Dept: LAB | Facility: MEDICAL CENTER | Age: 42
End: 2022-04-14
Attending: INTERNAL MEDICINE
Payer: COMMERCIAL

## 2022-04-14 ENCOUNTER — OFFICE VISIT (OUTPATIENT)
Dept: MEDICAL GROUP | Facility: MEDICAL CENTER | Age: 42
End: 2022-04-14
Payer: COMMERCIAL

## 2022-04-14 VITALS
DIASTOLIC BLOOD PRESSURE: 82 MMHG | HEART RATE: 85 BPM | WEIGHT: 152.12 LBS | SYSTOLIC BLOOD PRESSURE: 112 MMHG | TEMPERATURE: 97.4 F | HEIGHT: 67 IN | BODY MASS INDEX: 23.88 KG/M2 | OXYGEN SATURATION: 98 %

## 2022-04-14 DIAGNOSIS — D50.0 IRON DEFICIENCY ANEMIA DUE TO CHRONIC BLOOD LOSS: ICD-10-CM

## 2022-04-14 DIAGNOSIS — F32.9 REACTIVE DEPRESSION: ICD-10-CM

## 2022-04-14 PROBLEM — E78.00 HYPERCHOLESTEREMIA: Status: ACTIVE | Noted: 2020-09-29

## 2022-04-14 LAB
BASOPHILS # BLD AUTO: 0.5 % (ref 0–1.8)
BASOPHILS # BLD: 0.03 K/UL (ref 0–0.12)
COMMENT 1642: NORMAL
EOSINOPHIL # BLD AUTO: 0.05 K/UL (ref 0–0.51)
EOSINOPHIL NFR BLD: 0.8 % (ref 0–6.9)
ERYTHROCYTE [DISTWIDTH] IN BLOOD BY AUTOMATED COUNT: 67.6 FL (ref 35.9–50)
HCT VFR BLD AUTO: 32 % (ref 37–47)
HGB BLD-MCNC: 9.2 G/DL (ref 12–16)
IMM GRANULOCYTES # BLD AUTO: 0.01 K/UL (ref 0–0.11)
IMM GRANULOCYTES NFR BLD AUTO: 0.2 % (ref 0–0.9)
IRON SATN MFR SERPL: 30 % (ref 15–55)
IRON SERPL-MCNC: 106 UG/DL (ref 40–170)
LYMPHOCYTES # BLD AUTO: 1.62 K/UL (ref 1–4.8)
LYMPHOCYTES NFR BLD: 25.8 % (ref 22–41)
MACROCYTES BLD QL SMEAR: ABNORMAL
MCH RBC QN AUTO: 22.8 PG (ref 27–33)
MCHC RBC AUTO-ENTMCNC: 28.8 G/DL (ref 33.6–35)
MCV RBC AUTO: 79.2 FL (ref 81.4–97.8)
MONOCYTES # BLD AUTO: 0.31 K/UL (ref 0–0.85)
MONOCYTES NFR BLD AUTO: 4.9 % (ref 0–13.4)
NEUTROPHILS # BLD AUTO: 4.25 K/UL (ref 2–7.15)
NEUTROPHILS NFR BLD: 67.8 % (ref 44–72)
NRBC # BLD AUTO: 0 K/UL
NRBC BLD-RTO: 0 /100 WBC
OVALOCYTES BLD QL SMEAR: NORMAL
PLATELET # BLD AUTO: 384 K/UL (ref 164–446)
PLATELET BLD QL SMEAR: NORMAL
PMV BLD AUTO: 8.3 FL (ref 9–12.9)
POIKILOCYTOSIS BLD QL SMEAR: NORMAL
POLYCHROMASIA BLD QL SMEAR: NORMAL
RBC # BLD AUTO: 4.04 M/UL (ref 4.2–5.4)
RBC BLD AUTO: PRESENT
TIBC SERPL-MCNC: 358 UG/DL (ref 250–450)
UIBC SERPL-MCNC: 252 UG/DL (ref 110–370)
WBC # BLD AUTO: 6.3 K/UL (ref 4.8–10.8)

## 2022-04-14 PROCEDURE — 83550 IRON BINDING TEST: CPT

## 2022-04-14 PROCEDURE — 36415 COLL VENOUS BLD VENIPUNCTURE: CPT

## 2022-04-14 PROCEDURE — 83540 ASSAY OF IRON: CPT

## 2022-04-14 PROCEDURE — 99214 OFFICE O/P EST MOD 30 MIN: CPT | Performed by: INTERNAL MEDICINE

## 2022-04-14 PROCEDURE — 85025 COMPLETE CBC W/AUTO DIFF WBC: CPT

## 2022-04-14 RX ORDER — SODIUM CHLORIDE 9 MG/ML
INJECTION, SOLUTION INTRAVENOUS CONTINUOUS
Status: CANCELLED | OUTPATIENT
Start: 2022-04-14

## 2022-04-14 RX ORDER — METHYLPREDNISOLONE SODIUM SUCCINATE 125 MG/2ML
125 INJECTION, POWDER, LYOPHILIZED, FOR SOLUTION INTRAMUSCULAR; INTRAVENOUS PRN
Status: CANCELLED | OUTPATIENT
Start: 2022-04-14

## 2022-04-14 RX ORDER — DIPHENHYDRAMINE HYDROCHLORIDE 50 MG/ML
50 INJECTION INTRAMUSCULAR; INTRAVENOUS PRN
Status: CANCELLED | OUTPATIENT
Start: 2022-04-14

## 2022-04-14 RX ORDER — ESCITALOPRAM OXALATE 10 MG/1
10 TABLET ORAL DAILY
Qty: 30 TABLET | Refills: 1 | Status: SHIPPED | OUTPATIENT
Start: 2022-04-14 | End: 2022-05-12 | Stop reason: SDUPTHER

## 2022-04-14 RX ORDER — EPINEPHRINE 1 MG/ML(1)
0.5 AMPUL (ML) INJECTION PRN
Status: CANCELLED | OUTPATIENT
Start: 2022-04-14

## 2022-04-14 ASSESSMENT — PATIENT HEALTH QUESTIONNAIRE - PHQ9
5. POOR APPETITE OR OVEREATING: 2 - MORE THAN HALF THE DAYS
CLINICAL INTERPRETATION OF PHQ2 SCORE: 3
SUM OF ALL RESPONSES TO PHQ QUESTIONS 1-9: 18

## 2022-04-14 ASSESSMENT — ENCOUNTER SYMPTOMS
WEAKNESS: 0
SHORTNESS OF BREATH: 1
FEVER: 0
BLOOD IN STOOL: 0
DIZZINESS: 1
NERVOUS/ANXIOUS: 1
FOCAL WEAKNESS: 0
NAUSEA: 0
DEPRESSION: 1
VOMITING: 0
SORE THROAT: 0

## 2022-04-14 ASSESSMENT — FIBROSIS 4 INDEX: FIB4 SCORE: 1.322875655532295295

## 2022-04-14 NOTE — ASSESSMENT & PLAN NOTE
Status post 2 units of RBCs in February 2022.  On daily iron sulfate supplementation.  Galdo ongoing daily bleeding and significant symptoms of anemia, including fatigue, hair loss, dizziness/patient would benefit from iron infusion.    Repeat CBC, iron panel.  Order for iron infusion placed, patient is aware, that she will be contacted from infusion center with details of appointment.

## 2022-04-14 NOTE — PROGRESS NOTES
Subjective:     Chief Complaint   Patient presents with   • Lab Results     Diagnoses of Iron deficiency anemia due to chronic blood loss and Reactive depression were pertinent to this visit.      HPI: Jennie is a pleasant 41 y.o. female who presents today for follow up  To discuss results of her blood work and came up with a plan on treatment of her iron deficiency anemia.  At the end of the appointment patient expressed concerns of her depression symptoms, that she previously have not mentioned.    Problem   Reactive Depression    Patient has been going through significant stress due to recent divorce and her ongoing health issues.   During divorce, they did not have any therapy or counseling.  Currently patient is interested in psychotherapy services medical treatment of her symptoms.  We will start Lexapro 10 mg tablet daily.  Patient is aware, that she should expect effect from medication after 2 to 3 weeks.  Patient does admit to suicidal thoughts, however she does not have a plan.  Patient will seek care in emergency room or call 911 if those will develop.    PHQ-9 Screening 4/14/2022 2/7/2022 3/20/2017   Little interest or pleasure in doing things 0 - not at all 0 - not at all 0 - not at all   Feeling down, depressed, or hopeless 3 - nearly every day 0 - not at all 0 - not at all   Trouble falling or staying asleep, or sleeping too much 0 - not at all - -   Feeling tired or having little energy 3 - nearly every day - -   Poor appetite or overeating 2 - more than half the days - -   Feeling bad about yourself - or that you are a failure or have let yourself or your family down 3 - nearly every day - -   Trouble concentrating on things, such as reading the newspaper or watching television 3 - nearly every day - -   Moving or speaking so slowly that other people could have noticed. Or the opposite - being so fidgety or restless that you have been moving around a lot more than usual 3 - nearly every day - -    Thoughts that you would be better off dead, or of hurting yourself in some way 1 - several days - -   PHQ-2 Total Score 3 0 0   PHQ-9 Total Score 18 - -       Interpretation of PHQ-9 Total Score   Score Severity   1-4 No Depression   5-9 Mild Depression   10-14 Moderate Depression   15-19 Moderately Severe Depression   20-27 Severe Depression       Iron Deficiency Anemia Due to Chronic Blood Loss    Lab Results   Component Value Date/Time    WBC 5.4 03/16/2022 09:39 AM    RBC 4.50 03/16/2022 09:39 AM    HEMOGLOBIN 8.8 (L) 03/16/2022 09:39 AM    HEMATOCRIT 31.7 (L) 03/16/2022 09:39 AM    MCV 70.4 (L) 03/16/2022 09:39 AM    MCH 19.6 (L) 03/16/2022 09:39 AM    MCHC 27.8 (L) 03/16/2022 09:39 AM    MPV 9.1 03/16/2022 09:39 AM      Patient has not had a standing history of microcytic anemia, was attributed to menometrorrhagia.   S/p  2 units of RBCs and was given an injection of Depo-Provera.  Ferrous sulfate 325 mg oral daily.  Thalassemia was ruled out as well.  Patient admits, that her bleeding have improved, however she would still use around 4 tampons/pads per day.  She does have appointment with gynecology in 2 weeks.       Past Medical History:   Diagnosis Date   • Anemia    • Fibroids    • Hyperlipidemia    • Urinary incontinence      Past Surgical History:   Procedure Laterality Date   • GYN SURGERY      fibroids       Current Outpatient Medications Ordered in Epic   Medication Sig Dispense Refill   • escitalopram (LEXAPRO) 10 MG Tab Take 1 Tablet by mouth every day. 30 Tablet 1   • ferrous sulfate 325 (65 Fe) MG tablet Take 1 Tablet by mouth every day. Take with vitamin C or glass of orange juice 90 Tablet 1   • rosuvastatin (CRESTOR) 10 MG Tab Take 1 Tablet by mouth every evening. 90 Tablet 1   • VITAMIN D PO Take  by mouth.     • multivitamin (THERAGRAN) Tab Take 1 Tablet by mouth every day.       No current [facility]-ordered facility-administered medications on file.     Health Maintenance: deferred to next  "visit    Review of Systems   Constitutional: Positive for malaise/fatigue. Negative for fever.   HENT: Negative for sore throat.    Respiratory: Positive for shortness of breath.    Cardiovascular: Negative for chest pain and leg swelling.   Gastrointestinal: Negative for blood in stool, nausea and vomiting.   Genitourinary: Negative for hematuria.   Neurological: Positive for dizziness. Negative for focal weakness and weakness.   Psychiatric/Behavioral: Positive for depression. The patient is nervous/anxious.        Objective:     Exam:  /82 (BP Location: Right arm, Patient Position: Sitting, BP Cuff Size: Adult)   Pulse 85   Temp 36.3 °C (97.4 °F) (Temporal)   Ht 1.702 m (5' 7\")   Wt 69 kg (152 lb 1.9 oz)   SpO2 98%   BMI 23.82 kg/m²  Body mass index is 23.82 kg/m².    Physical Exam  Constitutional:       Appearance: Normal appearance.   HENT:      Head: Normocephalic and atraumatic.      Mouth/Throat:      Mouth: Mucous membranes are moist.      Comments: Pale mucous membranes  Eyes:      General: No scleral icterus.  Cardiovascular:      Rate and Rhythm: Normal rate.      Pulses: Normal pulses.      Heart sounds: Normal heart sounds.   Pulmonary:      Effort: Pulmonary effort is normal. No respiratory distress.      Breath sounds: Normal breath sounds.   Skin:     General: Skin is warm and dry.   Neurological:      General: No focal deficit present.      Mental Status: She is alert and oriented to person, place, and time. Mental status is at baseline.      Gait: Gait normal.   Psychiatric:         Mood and Affect: Mood normal.         Behavior: Behavior normal.         Thought Content: Thought content normal. Thought content does not include homicidal or suicidal plan.         Judgment: Judgment normal.       Labs: Reviewed and discussed results of folic acid, B12, CBC, ferritin,, platelets from March 16, 2022.    Assessment & Plan:   Jennie  is a pleasant 41 y.o. female with the following - "     Problem List Items Addressed This Visit     Iron deficiency anemia due to chronic blood loss     Status post 2 units of RBCs in February 2022.  On daily iron sulfate supplementation.  Galdo ongoing daily bleeding and significant symptoms of anemia, including fatigue, hair loss, dizziness/patient would benefit from iron infusion.    Repeat CBC, iron panel.  Order for iron infusion placed, patient is aware, that she will be contacted from infusion center with details of appointment.         Relevant Orders    CBC WITH DIFFERENTIAL    IRON/TOTAL IRON BIND    Reactive depression     This is a new problem, patient says, that she has been going through a lot recently, she has recently  with her partner of 16 years.  She unfortunately did not have any counseling at that time.  Currently she is also experiencing multiple health issues, that she is concerned about possible intervention for her fibroids.   She is interested in therapy services and medical treatment of her symptoms.  Will start Lexapro 10 mg tablet daily, patient does admit to suicidal thoughts, however denies plan.  Appropriate counseling provided, patient is aware to seek care in the emergency room or call 911 if she develops active suicidal plan.         Relevant Medications    escitalopram (LEXAPRO) 10 MG Tab    Other Relevant Orders    Referral to Behavioral Health          Return in about 4 weeks (around 5/12/2022), or if symptoms worsen or fail to improve.    Please note that this dictation was created using voice recognition software. I have made every reasonable attempt to correct obvious errors, but I expect that there are errors of grammar and possibly content that I did not discover before finalizing the note.

## 2022-04-14 NOTE — ASSESSMENT & PLAN NOTE
This is a new problem, patient says, that she has been going through a lot recently, she has recently  with her partner of 16 years.  She unfortunately did not have any counseling at that time.  Currently she is also experiencing multiple health issues, that she is concerned about possible intervention for her fibroids.   She is interested in therapy services and medical treatment of her symptoms.  Will start Lexapro 10 mg tablet daily, patient does admit to suicidal thoughts, however denies plan.  Appropriate counseling provided, patient is aware to seek care in the emergency room or call 911 if she develops active suicidal plan.

## 2022-04-23 ENCOUNTER — OUTPATIENT INFUSION SERVICES (OUTPATIENT)
Dept: ONCOLOGY | Facility: MEDICAL CENTER | Age: 42
End: 2022-04-23
Attending: INTERNAL MEDICINE
Payer: COMMERCIAL

## 2022-04-23 VITALS
SYSTOLIC BLOOD PRESSURE: 120 MMHG | RESPIRATION RATE: 14 BRPM | BODY MASS INDEX: 24.05 KG/M2 | TEMPERATURE: 99.3 F | HEART RATE: 87 BPM | OXYGEN SATURATION: 99 % | HEIGHT: 67 IN | WEIGHT: 153.22 LBS | DIASTOLIC BLOOD PRESSURE: 72 MMHG

## 2022-04-23 DIAGNOSIS — D50.0 IRON DEFICIENCY ANEMIA DUE TO CHRONIC BLOOD LOSS: ICD-10-CM

## 2022-04-23 LAB — FERRITIN SERPL-MCNC: 25.7 NG/ML (ref 10–291)

## 2022-04-23 PROCEDURE — 96365 THER/PROPH/DIAG IV INF INIT: CPT

## 2022-04-23 PROCEDURE — 700111 HCHG RX REV CODE 636 W/ 250 OVERRIDE (IP): Performed by: INTERNAL MEDICINE

## 2022-04-23 PROCEDURE — 82728 ASSAY OF FERRITIN: CPT

## 2022-04-23 PROCEDURE — 700105 HCHG RX REV CODE 258: Performed by: INTERNAL MEDICINE

## 2022-04-23 RX ORDER — METHYLPREDNISOLONE SODIUM SUCCINATE 125 MG/2ML
125 INJECTION, POWDER, LYOPHILIZED, FOR SOLUTION INTRAMUSCULAR; INTRAVENOUS PRN
Status: DISCONTINUED | OUTPATIENT
Start: 2022-04-23 | End: 2022-04-23 | Stop reason: HOSPADM

## 2022-04-23 RX ORDER — METHYLPREDNISOLONE SODIUM SUCCINATE 125 MG/2ML
125 INJECTION, POWDER, LYOPHILIZED, FOR SOLUTION INTRAMUSCULAR; INTRAVENOUS PRN
Status: CANCELLED | OUTPATIENT
Start: 2022-04-23

## 2022-04-23 RX ORDER — DIPHENHYDRAMINE HYDROCHLORIDE 50 MG/ML
50 INJECTION INTRAMUSCULAR; INTRAVENOUS PRN
Status: CANCELLED | OUTPATIENT
Start: 2022-04-23

## 2022-04-23 RX ORDER — SODIUM CHLORIDE 9 MG/ML
INJECTION, SOLUTION INTRAVENOUS CONTINUOUS
Status: CANCELLED | OUTPATIENT
Start: 2022-04-23

## 2022-04-23 RX ORDER — EPINEPHRINE 1 MG/ML(1)
0.5 AMPUL (ML) INJECTION PRN
Status: CANCELLED | OUTPATIENT
Start: 2022-04-23

## 2022-04-23 RX ADMIN — SODIUM CHLORIDE 1000 MG: 9 INJECTION, SOLUTION INTRAVENOUS at 16:13

## 2022-04-23 ASSESSMENT — FIBROSIS 4 INDEX: FIB4 SCORE: 1.03

## 2022-04-23 NOTE — PROGRESS NOTES
Patient in the clinic for Infed infusion. She reports fatigue and muscle aches are improving. She also feels less drowsy. She however continues to heavy menstrual bleeding and is going for possibkly hysterectomy. She has not taken her iron pill today as she usually takes it at bedtime. Iron levels drawn prior her visit reviewed with pharmacy. Ferritin level drawn as well. Ok to proceed today as per pharmacy pending Ferritin levels. Infed administered per MAR. Completed infusion post 10 mins observation without any discomfort. IV dc'd,site intact. Advised patient to continue to stop oral iron until she sees her doctor. Reinforced teaching of possible SE from Infed infusion. Patient verbalizes understanding. Left clinic ambulatory w/o any distress. No future appt made today.

## 2022-04-25 ENCOUNTER — HOSPITAL ENCOUNTER (OUTPATIENT)
Dept: CARDIOLOGY | Facility: MEDICAL CENTER | Age: 42
End: 2022-04-25
Attending: INTERNAL MEDICINE
Payer: COMMERCIAL

## 2022-04-25 DIAGNOSIS — E78.5 DYSLIPIDEMIA: ICD-10-CM

## 2022-04-25 DIAGNOSIS — R03.0 ELEVATED BLOOD PRESSURE READING: ICD-10-CM

## 2022-04-25 LAB
LV EJECT FRACT  99904: 60
LV EJECT FRACT MOD 2C 99903: 63.03
LV EJECT FRACT MOD 4C 99902: 66.97
LV EJECT FRACT MOD BP 99901: 64.6

## 2022-04-25 PROCEDURE — 93306 TTE W/DOPPLER COMPLETE: CPT | Mod: 26 | Performed by: INTERNAL MEDICINE

## 2022-04-25 PROCEDURE — 93306 TTE W/DOPPLER COMPLETE: CPT

## 2022-05-11 NOTE — PROGRESS NOTES
Subjective:     Diagnoses of Reactive depression, Iron deficiency anemia due to chronic blood loss, and Health care maintenance were pertinent to this visit.    HPI: Jennie is a pleasant 41 y.o. female who presents today for follow up     Problem   Reactive Depression    Patient admits significant improvement in her symptoms since initiation of Lexapro 10 mg tablet daily.  We will increase to 15 mg tablet daily.  No SI or HI.     Iron Deficiency Anemia Due to Chronic Blood Loss    Lab Results   Component Value Date/Time    WBC 6.3 04/14/2022 10:03 AM    RBC 4.04 (L) 04/14/2022 10:03 AM    HEMOGLOBIN 9.2 (L) 04/14/2022 10:03 AM    HEMATOCRIT 32.0 (L) 04/14/2022 10:03 AM    MCV 79.2 (L) 04/14/2022 10:03 AM    MCH 22.8 (L) 04/14/2022 10:03 AM    MCHC 28.8 (L) 04/14/2022 10:03 AM    MPV 8.3 (L) 04/14/2022 10:03 AM   Secondary to menometrorrhagia. S/p  2 units of RBCs and was given an injection of Depo-Provera.  S/p Iron infusion. Thalassemia was ruled out as well.  Scheduled for hysterectomy in July 2022       Past Medical History:   Diagnosis Date   • Anemia    • Fibroids    • Hyperlipidemia    • Urinary incontinence      Past Surgical History:   Procedure Laterality Date   • GYN SURGERY      fibroids     Family History   Problem Relation Age of Onset   • Diabetes Father    • Hyperlipidemia Father    • Hyperlipidemia Mother    • Psychiatric Illness Mother    • Cancer Sister         cervical cance   • Cancer Maternal Grandmother    • Hyperlipidemia Sister    • Psychiatric Illness Sister    • Heart Disease Sister    • Heart Disease Brother    • Thyroid Neg Hx      Social History     Tobacco Use   • Smoking status: Never Smoker   • Smokeless tobacco: Never Used   Vaping Use   • Vaping Use: Never used   Substance Use Topics   • Alcohol use: Yes     Alcohol/week: 0.0 oz     Comment: occ   • Drug use: No       Current Outpatient Medications Ordered in Epic   Medication Sig Dispense Refill   • escitalopram (LEXAPRO) 10 MG Tab  "Take 1.5 Tablets by mouth every day for 90 days. 135 Tablet 1   • medroxyPROGESTERone 150 MG/ML Suspension Prefilled Syringe ADMINISTER 1 ML INTRAMUSCULARLY ONE TIME     • ferrous sulfate 325 (65 Fe) MG tablet Take 1 Tablet by mouth every day. Take with vitamin C or glass of orange juice 90 Tablet 1   • rosuvastatin (CRESTOR) 10 MG Tab Take 1 Tablet by mouth every evening. 90 Tablet 1   • VITAMIN D PO Take  by mouth.     • multivitamin (THERAGRAN) Tab Take 1 Tablet by mouth every day.       No current [facility]-ordered facility-administered medications on file.     Health Maintenance: Pap smear completed with gynecology, pending mammogram.    Review of Systems   Constitutional: Negative for fever and malaise/fatigue.   HENT: Negative for sore throat.    Respiratory: Negative for shortness of breath.    Cardiovascular: Negative for chest pain.   Gastrointestinal: Negative for nausea and vomiting.   Skin: Negative for rash.   Psychiatric/Behavioral: Positive for depression. Negative for substance abuse and suicidal ideas.       Objective:     Exam:  /74 (BP Location: Right arm, Patient Position: Sitting, BP Cuff Size: Adult)   Pulse 76   Temp 36.4 °C (97.6 °F) (Temporal)   Ht 1.702 m (5' 7\")   Wt 70.3 kg (154 lb 15.7 oz)   SpO2 100%   BMI 24.27 kg/m²  Body mass index is 24.27 kg/m².    Physical Exam  Constitutional:       Appearance: Normal appearance.   HENT:      Head: Normocephalic and atraumatic.      Nose: Nose normal. No congestion.      Mouth/Throat:      Mouth: Mucous membranes are moist.      Pharynx: Oropharynx is clear.   Eyes:      General: No scleral icterus.  Cardiovascular:      Rate and Rhythm: Normal rate and regular rhythm.      Pulses: Normal pulses.      Heart sounds: Normal heart sounds. No murmur heard.  Pulmonary:      Effort: Pulmonary effort is normal. No respiratory distress.      Breath sounds: Normal breath sounds. No wheezing or rales.   Skin:     General: Skin is warm and dry. "   Neurological:      General: No focal deficit present.      Mental Status: She is alert and oriented to person, place, and time.      Gait: Gait normal.   Psychiatric:         Mood and Affect: Mood normal.         Behavior: Behavior normal.         Thought Content: Thought content normal.         Judgment: Judgment normal.       Labs: Reviewed CBC, iron panel from 4/14/2022, ferritin from 4/23/2022.  Imaging: Echocardiogram from  4/25/2022-trace tricuspid valve regurgitation.    Assessment & Plan:   Jennie  is a pleasant 41 y.o. female with the following -     Problem List Items Addressed This Visit     Health care maintenance    Relevant Orders    Comp Metabolic Panel    Iron deficiency anemia due to chronic blood loss     Patient will resume oral ferrous sulfate in 3 weeks.  Scheduled for hysterectomy in July 2022.           Relevant Orders    CBC WITH DIFFERENTIAL    IRON/TOTAL IRON BIND    Reactive depression     Significantly improved with Lexapro, patient is awaiting appointment with psychology.  No SI/HI.  Patient is aware to seek care in emergency room or call 911 if she develops active suicidal thoughts or ideations.  Increase Lexapro to 15 mg tablet daily.           Relevant Medications    escitalopram (LEXAPRO) 10 MG Tab        Return in about 3 months (around 8/1/2022), or if symptoms worsen or fail to improve.    Please note that this dictation was created using voice recognition software. I have made every reasonable attempt to correct obvious errors, but I expect that there are errors of grammar and possibly content that I did not discover before finalizing the note.

## 2022-05-12 ENCOUNTER — OFFICE VISIT (OUTPATIENT)
Dept: MEDICAL GROUP | Facility: MEDICAL CENTER | Age: 42
End: 2022-05-12
Payer: COMMERCIAL

## 2022-05-12 VITALS
SYSTOLIC BLOOD PRESSURE: 102 MMHG | OXYGEN SATURATION: 100 % | HEART RATE: 76 BPM | TEMPERATURE: 97.6 F | BODY MASS INDEX: 24.33 KG/M2 | DIASTOLIC BLOOD PRESSURE: 74 MMHG | WEIGHT: 154.98 LBS | HEIGHT: 67 IN

## 2022-05-12 DIAGNOSIS — Z00.00 HEALTH CARE MAINTENANCE: ICD-10-CM

## 2022-05-12 DIAGNOSIS — F32.9 REACTIVE DEPRESSION: ICD-10-CM

## 2022-05-12 DIAGNOSIS — D50.0 IRON DEFICIENCY ANEMIA DUE TO CHRONIC BLOOD LOSS: ICD-10-CM

## 2022-05-12 PROCEDURE — 99214 OFFICE O/P EST MOD 30 MIN: CPT | Performed by: INTERNAL MEDICINE

## 2022-05-12 RX ORDER — ESCITALOPRAM OXALATE 10 MG/1
15 TABLET ORAL DAILY
Qty: 135 TABLET | Refills: 1 | Status: SHIPPED | OUTPATIENT
Start: 2022-05-12 | End: 2022-08-10

## 2022-05-12 RX ORDER — MEDROXYPROGESTERONE ACETATE 150 MG/ML
INJECTION, SUSPENSION INTRAMUSCULAR
COMMUNITY
Start: 2022-04-29 | End: 2022-12-08

## 2022-05-12 ASSESSMENT — ENCOUNTER SYMPTOMS
DEPRESSION: 1
SORE THROAT: 0
NAUSEA: 0
FEVER: 0
SHORTNESS OF BREATH: 0
VOMITING: 0

## 2022-05-12 ASSESSMENT — LIFESTYLE VARIABLES: SUBSTANCE_ABUSE: 0

## 2022-05-12 ASSESSMENT — FIBROSIS 4 INDEX: FIB4 SCORE: 1.03

## 2022-05-12 NOTE — ASSESSMENT & PLAN NOTE
Significantly improved with Lexapro, patient is awaiting appointment with psychology.  No SI/HI.  Patient is aware to seek care in emergency room or call 911 if she develops active suicidal thoughts or ideations.  Increase Lexapro to 15 mg tablet daily.

## 2022-06-30 ENCOUNTER — HOSPITAL ENCOUNTER (OUTPATIENT)
Dept: LAB | Facility: MEDICAL CENTER | Age: 42
End: 2022-06-30
Attending: OBSTETRICS & GYNECOLOGY
Payer: COMMERCIAL

## 2022-06-30 LAB
BASOPHILS # BLD AUTO: 0.3 % (ref 0–1.8)
BASOPHILS # BLD: 0.02 K/UL (ref 0–0.12)
EOSINOPHIL # BLD AUTO: 0.03 K/UL (ref 0–0.51)
EOSINOPHIL NFR BLD: 0.5 % (ref 0–6.9)
ERYTHROCYTE [DISTWIDTH] IN BLOOD BY AUTOMATED COUNT: 49.9 FL (ref 35.9–50)
HCT VFR BLD AUTO: 33.9 % (ref 37–47)
HGB BLD-MCNC: 10.6 G/DL (ref 12–16)
IMM GRANULOCYTES # BLD AUTO: 0.02 K/UL (ref 0–0.11)
IMM GRANULOCYTES NFR BLD AUTO: 0.3 % (ref 0–0.9)
IRON SATN MFR SERPL: 5 % (ref 15–55)
IRON SERPL-MCNC: 19 UG/DL (ref 40–170)
LYMPHOCYTES # BLD AUTO: 2 K/UL (ref 1–4.8)
LYMPHOCYTES NFR BLD: 31 % (ref 22–41)
MCH RBC QN AUTO: 28 PG (ref 27–33)
MCHC RBC AUTO-ENTMCNC: 31.3 G/DL (ref 33.6–35)
MCV RBC AUTO: 89.7 FL (ref 81.4–97.8)
MONOCYTES # BLD AUTO: 0.35 K/UL (ref 0–0.85)
MONOCYTES NFR BLD AUTO: 5.4 % (ref 0–13.4)
NEUTROPHILS # BLD AUTO: 4.04 K/UL (ref 2–7.15)
NEUTROPHILS NFR BLD: 62.5 % (ref 44–72)
NRBC # BLD AUTO: 0 K/UL
NRBC BLD-RTO: 0 /100 WBC
PLATELET # BLD AUTO: 306 K/UL (ref 164–446)
PMV BLD AUTO: 8.8 FL (ref 9–12.9)
RBC # BLD AUTO: 3.78 M/UL (ref 4.2–5.4)
TIBC SERPL-MCNC: 396 UG/DL (ref 250–450)
UIBC SERPL-MCNC: 377 UG/DL (ref 110–370)
WBC # BLD AUTO: 6.5 K/UL (ref 4.8–10.8)

## 2022-06-30 PROCEDURE — 83550 IRON BINDING TEST: CPT

## 2022-06-30 PROCEDURE — 36415 COLL VENOUS BLD VENIPUNCTURE: CPT

## 2022-06-30 PROCEDURE — 85025 COMPLETE CBC W/AUTO DIFF WBC: CPT

## 2022-06-30 PROCEDURE — 83540 ASSAY OF IRON: CPT

## 2022-08-16 DIAGNOSIS — F32.9 REACTIVE DEPRESSION: ICD-10-CM

## 2022-08-16 RX ORDER — ESCITALOPRAM OXALATE 10 MG/1
15 TABLET ORAL DAILY
Qty: 135 TABLET | Refills: 1 | Status: SHIPPED | OUTPATIENT
Start: 2022-08-16 | End: 2022-11-14

## 2022-08-16 NOTE — TELEPHONE ENCOUNTER
Received request via: Pharmacy    Was the patient seen in the last year in this department? Yes    Does the patient have an active prescription (recently filled or refills available) for medication(s) requested? No      The original prescription was reordered on 5/12/2022 by Liz Wyatt M.D.. Renewing this prescription may not be appropriate

## 2022-11-02 DIAGNOSIS — D50.9 MICROCYTIC ANEMIA: ICD-10-CM

## 2022-11-02 RX ORDER — FERROUS SULFATE 325(65) MG
325 TABLET ORAL DAILY
Qty: 90 TABLET | Refills: 1 | Status: SHIPPED | OUTPATIENT
Start: 2022-11-02 | End: 2024-01-30

## 2022-12-08 ENCOUNTER — OFFICE VISIT (OUTPATIENT)
Dept: MEDICAL GROUP | Facility: MEDICAL CENTER | Age: 42
End: 2022-12-08
Payer: COMMERCIAL

## 2022-12-08 ENCOUNTER — HOSPITAL ENCOUNTER (OUTPATIENT)
Dept: LAB | Facility: MEDICAL CENTER | Age: 42
End: 2022-12-08
Attending: INTERNAL MEDICINE
Payer: COMMERCIAL

## 2022-12-08 VITALS
WEIGHT: 167.77 LBS | SYSTOLIC BLOOD PRESSURE: 100 MMHG | HEART RATE: 78 BPM | BODY MASS INDEX: 26.33 KG/M2 | HEIGHT: 67 IN | DIASTOLIC BLOOD PRESSURE: 72 MMHG | OXYGEN SATURATION: 99 % | TEMPERATURE: 97.5 F

## 2022-12-08 DIAGNOSIS — D50.0 IRON DEFICIENCY ANEMIA DUE TO CHRONIC BLOOD LOSS: ICD-10-CM

## 2022-12-08 DIAGNOSIS — E78.5 DYSLIPIDEMIA: ICD-10-CM

## 2022-12-08 DIAGNOSIS — F32.9 REACTIVE DEPRESSION: ICD-10-CM

## 2022-12-08 DIAGNOSIS — E78.00 HYPERCHOLESTEREMIA: ICD-10-CM

## 2022-12-08 DIAGNOSIS — Z90.710 H/O TOTAL HYSTERECTOMY: ICD-10-CM

## 2022-12-08 PROBLEM — N92.1 MENOMETRORRHAGIA: Status: RESOLVED | Noted: 2017-07-14 | Resolved: 2022-12-08

## 2022-12-08 PROBLEM — R32 URINARY INCONTINENCE: Status: RESOLVED | Noted: 2017-03-20 | Resolved: 2022-12-08

## 2022-12-08 PROBLEM — D25.9 UTERINE LEIOMYOMA: Status: RESOLVED | Noted: 2017-03-27 | Resolved: 2022-12-08

## 2022-12-08 LAB
BASOPHILS # BLD AUTO: 0.4 % (ref 0–1.8)
BASOPHILS # BLD: 0.02 K/UL (ref 0–0.12)
CHOLEST SERPL-MCNC: 325 MG/DL (ref 100–199)
EOSINOPHIL # BLD AUTO: 0.04 K/UL (ref 0–0.51)
EOSINOPHIL NFR BLD: 0.8 % (ref 0–6.9)
ERYTHROCYTE [DISTWIDTH] IN BLOOD BY AUTOMATED COUNT: 49.1 FL (ref 35.9–50)
FASTING STATUS PATIENT QL REPORTED: NORMAL
FERRITIN SERPL-MCNC: 18.9 NG/ML (ref 10–291)
HCT VFR BLD AUTO: 40 % (ref 37–47)
HDLC SERPL-MCNC: 90 MG/DL
HGB BLD-MCNC: 12.4 G/DL (ref 12–16)
IMM GRANULOCYTES # BLD AUTO: 0.01 K/UL (ref 0–0.11)
IMM GRANULOCYTES NFR BLD AUTO: 0.2 % (ref 0–0.9)
IRON SATN MFR SERPL: 24 % (ref 15–55)
IRON SERPL-MCNC: 103 UG/DL (ref 40–170)
LDLC SERPL CALC-MCNC: 217 MG/DL
LYMPHOCYTES # BLD AUTO: 1.77 K/UL (ref 1–4.8)
LYMPHOCYTES NFR BLD: 34.8 % (ref 22–41)
MCH RBC QN AUTO: 26.4 PG (ref 27–33)
MCHC RBC AUTO-ENTMCNC: 31 G/DL (ref 33.6–35)
MCV RBC AUTO: 85.1 FL (ref 81.4–97.8)
MONOCYTES # BLD AUTO: 0.27 K/UL (ref 0–0.85)
MONOCYTES NFR BLD AUTO: 5.3 % (ref 0–13.4)
NEUTROPHILS # BLD AUTO: 2.97 K/UL (ref 2–7.15)
NEUTROPHILS NFR BLD: 58.5 % (ref 44–72)
NRBC # BLD AUTO: 0 K/UL
NRBC BLD-RTO: 0 /100 WBC
PLATELET # BLD AUTO: 307 K/UL (ref 164–446)
PMV BLD AUTO: 9.1 FL (ref 9–12.9)
RBC # BLD AUTO: 4.7 M/UL (ref 4.2–5.4)
TIBC SERPL-MCNC: 431 UG/DL (ref 250–450)
TRIGL SERPL-MCNC: 90 MG/DL (ref 0–149)
UIBC SERPL-MCNC: 328 UG/DL (ref 110–370)
WBC # BLD AUTO: 5.1 K/UL (ref 4.8–10.8)

## 2022-12-08 PROCEDURE — 36415 COLL VENOUS BLD VENIPUNCTURE: CPT

## 2022-12-08 PROCEDURE — 83540 ASSAY OF IRON: CPT

## 2022-12-08 PROCEDURE — 80061 LIPID PANEL: CPT

## 2022-12-08 PROCEDURE — 85025 COMPLETE CBC W/AUTO DIFF WBC: CPT

## 2022-12-08 PROCEDURE — 82728 ASSAY OF FERRITIN: CPT

## 2022-12-08 PROCEDURE — 83550 IRON BINDING TEST: CPT

## 2022-12-08 PROCEDURE — 99214 OFFICE O/P EST MOD 30 MIN: CPT | Performed by: INTERNAL MEDICINE

## 2022-12-08 RX ORDER — BUPROPION HYDROCHLORIDE 150 MG/1
TABLET ORAL
Qty: 59 TABLET | Refills: 1 | Status: SHIPPED | OUTPATIENT
Start: 2022-12-08 | End: 2023-01-04

## 2022-12-08 ASSESSMENT — ENCOUNTER SYMPTOMS
SORE THROAT: 0
NAUSEA: 0
SHORTNESS OF BREATH: 0
FEVER: 0
VOMITING: 0
DEPRESSION: 1

## 2022-12-08 ASSESSMENT — FIBROSIS 4 INDEX: FIB4 SCORE: 1.33

## 2022-12-08 ASSESSMENT — LIFESTYLE VARIABLES: SUBSTANCE_ABUSE: 0

## 2022-12-08 NOTE — ASSESSMENT & PLAN NOTE
Uncontrolled patient would really like to start psychotherapy, she did not have good experience with [facility].    - New referral to behavioral health   - Start Wellbutrin 150 mg daily, increase as directed.   -Close follow-up in 3-4 weeks.

## 2022-12-08 NOTE — PROGRESS NOTES
Subjective:     Diagnoses of Iron deficiency anemia due to chronic blood loss, Dyslipidemia, H/O total hysterectomy, Reactive depression, and Hypercholesteremia were pertinent to this visit.    HPI: Jennie is a pleasant 42 y.o. female who presents today for follow-up    Problem   H/O Total Hysterectomy    09/09/2022      Reactive Depression    Prescribed Lexapro in April 2022, patient felt improvement of her symptoms, however it was making her inattentive and fatigued.  She has stopped taking it a few months back.   She was referred to psychotherapy, she only had 1 session and she was unable to reach the office for follow-up appointment.  She is interested in new referral today.  No SI or HI.     Iron Deficiency Anemia Due to Chronic Blood Loss    Significant iron deficiency anemia secondary to menometrorrhagia, which required blood transfusion, iron infusion.  Most recent CBC:  Lab Results   Component Value Date/Time    WBC 6.5 06/30/2022 03:51 PM    RBC 3.78 (L) 06/30/2022 03:51 PM    HEMOGLOBIN 10.6 (L) 06/30/2022 03:51 PM    HEMATOCRIT 33.9 (L) 06/30/2022 03:51 PM    MCV 89.7 06/30/2022 03:51 PM    MCH 28.0 06/30/2022 03:51 PM    MCHC 31.3 (L) 06/30/2022 03:51 PM    MPV 8.8 (L) 06/30/2022 03:51 PM   s/p hysterectomy September 9, 2022.     Hypercholesteremia    This is a chronic condition, patient has not been consistent with taking her rosuvastatin.  Lab Results   Component Value Date/Time    CHOLSTRLTOT 230 (H) 02/10/2022 11:29 AM    TRIGLYCERIDE 66 02/10/2022 11:29 AM    HDL 86 02/10/2022 11:29 AM     (H) 02/10/2022 11:29 AM    Patient reports significant family history of early heart attacks in her first-degree relatives.       Current Outpatient Medications Ordered in Epic   Medication Sig Dispense Refill    buPROPion (WELLBUTRIN XL) 150 MG XL tablet Take 1 Tablet by mouth every morning for 3 days, THEN 2 Tablets every morning for 28 days. 59 Tablet 1    ferrous sulfate 325 (65 Fe) MG tablet TAKE 1  "TABLET BY MOUTH EVERY DAY. TAKE WITH VITAMIN C OR GLASS OF ORANGE JUICE 90 Tablet 1    rosuvastatin (CRESTOR) 10 MG Tab TAKE 1 TABLET BY MOUTH EVERY DAY IN THE EVENING 90 Tablet 3    VITAMIN D PO Take  by mouth.      multivitamin (THERAGRAN) Tab Take 1 Tablet by mouth every day.       No current [facility]-ordered facility-administered medications on file.     Review of Systems   Constitutional:  Negative for fever and malaise/fatigue.   HENT:  Negative for sore throat.    Respiratory:  Negative for shortness of breath.    Cardiovascular:  Negative for chest pain.   Gastrointestinal:  Negative for nausea and vomiting.   Skin:  Negative for rash.   Psychiatric/Behavioral:  Positive for depression. Negative for substance abuse and suicidal ideas.      Objective:     Exam:  /72   Pulse 78   Temp 36.4 °C (97.5 °F) (Temporal)   Ht 1.702 m (5' 7\")   Wt 76.1 kg (167 lb 12.3 oz)   SpO2 99%   BMI 26.28 kg/m²  Body mass index is 26.28 kg/m².    Physical Exam  Constitutional:       Appearance: Normal appearance.   HENT:      Head: Normocephalic and atraumatic.   Eyes:      General: No scleral icterus.  Cardiovascular:      Rate and Rhythm: Normal rate and regular rhythm.      Pulses: Normal pulses.      Heart sounds: Normal heart sounds. No murmur heard.  Pulmonary:      Effort: Pulmonary effort is normal. No respiratory distress.      Breath sounds: Normal breath sounds. No wheezing.   Skin:     General: Skin is warm and dry.   Neurological:      Mental Status: She is alert and oriented to person, place, and time.      Gait: Gait normal.   Psychiatric:         Mood and Affect: Mood normal.         Behavior: Behavior normal.     Labs: Reviewed CBC and iron panel from 6/30/2022    Assessment & Plan:   Jennie  is a pleasant 42 y.o. female with the following -     Problem List Items Addressed This Visit       Hypercholesteremia (Chronic)     Patient was prescribed rosuvastatin, has not been consistent with taking it.  " Reminded patient to start taking it daily and about healthful lifestyle measures.         H/O total hysterectomy    Iron deficiency anemia due to chronic blood loss     Severe iron deficiency anemia due to chronic blood loss secondary to menometrorrhagia.  Required transfusion, iron infusion and oral iron supplementation in the past.  Status post hysterectomy 3 months ago.   -Repeat CBC, iron and ferritin         Relevant Orders    IRON/TOTAL IRON BIND    CBC WITH DIFFERENTIAL    FERRITIN    Reactive depression     Uncontrolled patient would really like to start psychotherapy, she did not have good experience with [facility].    - New referral to behavioral health   - Start Wellbutrin 150 mg daily, increase as directed.   -Close follow-up in 3-4 weeks.         Relevant Medications    buPROPion (WELLBUTRIN XL) 150 MG XL tablet    Other Relevant Orders    Referral to Behavioral Health     Other Visit Diagnoses       Dyslipidemia        Relevant Orders    Lipid Profile            Return in about 4 weeks (around 1/5/2023), or if symptoms worsen or fail to improve.    Please note that this dictation was created using voice recognition software. I have made every reasonable attempt to correct obvious errors, but I expect that there are errors of grammar and possibly content that I did not discover before finalizing the note.

## 2022-12-08 NOTE — ASSESSMENT & PLAN NOTE
Patient was prescribed rosuvastatin, has not been consistent with taking it.  Reminded patient to start taking it daily and about healthful lifestyle measures.

## 2022-12-08 NOTE — ASSESSMENT & PLAN NOTE
Severe iron deficiency anemia due to chronic blood loss secondary to menometrorrhagia.  Required transfusion, iron infusion and oral iron supplementation in the past.  Status post hysterectomy 3 months ago.   -Repeat CBC, iron and ferritin

## 2023-01-12 ENCOUNTER — APPOINTMENT (OUTPATIENT)
Dept: MEDICAL GROUP | Facility: MEDICAL CENTER | Age: 43
End: 2023-01-12
Payer: COMMERCIAL

## 2023-01-18 DIAGNOSIS — F32.9 REACTIVE DEPRESSION: ICD-10-CM

## 2023-01-19 RX ORDER — BUPROPION HYDROCHLORIDE 150 MG/1
TABLET ORAL
Qty: 177 TABLET | Refills: 1 | Status: SHIPPED | OUTPATIENT
Start: 2023-01-19 | End: 2023-04-19

## 2023-01-19 NOTE — TELEPHONE ENCOUNTER
Received request via: Pharmacy    Was the patient seen in the last year in this department? Yes    Does the patient have an active prescription (recently filled or refills available) for medication(s) requested? Yes-Patient requesting 90 day supply     Does the patient have [facility] and need 100 day supply (blood pressure, diabetes and cholesterol meds only)? Patient does not have SCP

## 2024-01-29 SDOH — ECONOMIC STABILITY: INCOME INSECURITY: IN THE LAST 12 MONTHS, WAS THERE A TIME WHEN YOU WERE NOT ABLE TO PAY THE MORTGAGE OR RENT ON TIME?: YES

## 2024-01-29 SDOH — ECONOMIC STABILITY: HOUSING INSECURITY: IN THE LAST 12 MONTHS, HOW MANY PLACES HAVE YOU LIVED?: 2

## 2024-01-29 SDOH — HEALTH STABILITY: PHYSICAL HEALTH: ON AVERAGE, HOW MANY DAYS PER WEEK DO YOU ENGAGE IN MODERATE TO STRENUOUS EXERCISE (LIKE A BRISK WALK)?: 2 DAYS

## 2024-01-29 SDOH — ECONOMIC STABILITY: INCOME INSECURITY: HOW HARD IS IT FOR YOU TO PAY FOR THE VERY BASICS LIKE FOOD, HOUSING, MEDICAL CARE, AND HEATING?: SOMEWHAT HARD

## 2024-01-29 SDOH — ECONOMIC STABILITY: FOOD INSECURITY: WITHIN THE PAST 12 MONTHS, YOU WORRIED THAT YOUR FOOD WOULD RUN OUT BEFORE YOU GOT MONEY TO BUY MORE.: SOMETIMES TRUE

## 2024-01-29 SDOH — ECONOMIC STABILITY: HOUSING INSECURITY
IN THE LAST 12 MONTHS, WAS THERE A TIME WHEN YOU DID NOT HAVE A STEADY PLACE TO SLEEP OR SLEPT IN A SHELTER (INCLUDING NOW)?: NO

## 2024-01-29 SDOH — HEALTH STABILITY: PHYSICAL HEALTH: ON AVERAGE, HOW MANY MINUTES DO YOU ENGAGE IN EXERCISE AT THIS LEVEL?: 30 MIN

## 2024-01-29 SDOH — ECONOMIC STABILITY: TRANSPORTATION INSECURITY
IN THE PAST 12 MONTHS, HAS LACK OF RELIABLE TRANSPORTATION KEPT YOU FROM MEDICAL APPOINTMENTS, MEETINGS, WORK OR FROM GETTING THINGS NEEDED FOR DAILY LIVING?: NO

## 2024-01-29 SDOH — ECONOMIC STABILITY: TRANSPORTATION INSECURITY
IN THE PAST 12 MONTHS, HAS THE LACK OF TRANSPORTATION KEPT YOU FROM MEDICAL APPOINTMENTS OR FROM GETTING MEDICATIONS?: NO

## 2024-01-29 SDOH — ECONOMIC STABILITY: TRANSPORTATION INSECURITY
IN THE PAST 12 MONTHS, HAS LACK OF TRANSPORTATION KEPT YOU FROM MEETINGS, WORK, OR FROM GETTING THINGS NEEDED FOR DAILY LIVING?: NO

## 2024-01-29 SDOH — ECONOMIC STABILITY: FOOD INSECURITY: WITHIN THE PAST 12 MONTHS, THE FOOD YOU BOUGHT JUST DIDN'T LAST AND YOU DIDN'T HAVE MONEY TO GET MORE.: SOMETIMES TRUE

## 2024-01-29 SDOH — ECONOMIC STABILITY: HOUSING INSECURITY
IN THE LAST 12 MONTHS, WAS THERE A TIME WHEN YOU DID NOT HAVE A STEADY PLACE TO SLEEP OR SLEPT IN A SHELTER (INCLUDING NOW)?: YES

## 2024-01-29 SDOH — HEALTH STABILITY: MENTAL HEALTH
STRESS IS WHEN SOMEONE FEELS TENSE, NERVOUS, ANXIOUS, OR CAN'T SLEEP AT NIGHT BECAUSE THEIR MIND IS TROUBLED. HOW STRESSED ARE YOU?: VERY MUCH

## 2024-01-29 ASSESSMENT — SOCIAL DETERMINANTS OF HEALTH (SDOH)
HOW OFTEN DO YOU ATTEND CHURCH OR RELIGIOUS SERVICES?: 1 TO 4 TIMES PER YEAR
HOW OFTEN DO YOU ATTENT MEETINGS OF THE CLUB OR ORGANIZATION YOU BELONG TO?: NEVER
HOW OFTEN DO YOU HAVE SIX OR MORE DRINKS ON ONE OCCASION: LESS THAN MONTHLY
HOW OFTEN DO YOU HAVE A DRINK CONTAINING ALCOHOL: 2-3 TIMES A WEEK
HOW OFTEN DO YOU GET TOGETHER WITH FRIENDS OR RELATIVES?: TWICE A WEEK
DO YOU BELONG TO ANY CLUBS OR ORGANIZATIONS SUCH AS CHURCH GROUPS UNIONS, FRATERNAL OR ATHLETIC GROUPS, OR SCHOOL GROUPS?: NO
HOW HARD IS IT FOR YOU TO PAY FOR THE VERY BASICS LIKE FOOD, HOUSING, MEDICAL CARE, AND HEATING?: SOMEWHAT HARD
HOW OFTEN DO YOU ATTEND CHURCH OR RELIGIOUS SERVICES?: 1 TO 4 TIMES PER YEAR
IN A TYPICAL WEEK, HOW MANY TIMES DO YOU TALK ON THE PHONE WITH FAMILY, FRIENDS, OR NEIGHBORS?: ONCE A WEEK
HOW MANY DRINKS CONTAINING ALCOHOL DO YOU HAVE ON A TYPICAL DAY WHEN YOU ARE DRINKING: 1 OR 2
HOW OFTEN DO YOU ATTENT MEETINGS OF THE CLUB OR ORGANIZATION YOU BELONG TO?: NEVER
DO YOU BELONG TO ANY CLUBS OR ORGANIZATIONS SUCH AS CHURCH GROUPS UNIONS, FRATERNAL OR ATHLETIC GROUPS, OR SCHOOL GROUPS?: NO
IN A TYPICAL WEEK, HOW MANY TIMES DO YOU TALK ON THE PHONE WITH FAMILY, FRIENDS, OR NEIGHBORS?: ONCE A WEEK
WITHIN THE PAST 12 MONTHS, YOU WORRIED THAT YOUR FOOD WOULD RUN OUT BEFORE YOU GOT THE MONEY TO BUY MORE: SOMETIMES TRUE
HOW OFTEN DO YOU GET TOGETHER WITH FRIENDS OR RELATIVES?: TWICE A WEEK

## 2024-01-29 ASSESSMENT — LIFESTYLE VARIABLES
SKIP TO QUESTIONS 9-10: 0
AUDIT-C TOTAL SCORE: 4
HOW OFTEN DO YOU HAVE SIX OR MORE DRINKS ON ONE OCCASION: LESS THAN MONTHLY
HOW OFTEN DO YOU HAVE A DRINK CONTAINING ALCOHOL: 2-3 TIMES A WEEK
HOW MANY STANDARD DRINKS CONTAINING ALCOHOL DO YOU HAVE ON A TYPICAL DAY: 1 OR 2

## 2024-01-30 ENCOUNTER — OFFICE VISIT (OUTPATIENT)
Dept: MEDICAL GROUP | Facility: MEDICAL CENTER | Age: 44
End: 2024-01-30
Payer: OTHER MISCELLANEOUS

## 2024-01-30 VITALS
TEMPERATURE: 98.6 F | OXYGEN SATURATION: 100 % | WEIGHT: 169.75 LBS | SYSTOLIC BLOOD PRESSURE: 116 MMHG | HEART RATE: 71 BPM | HEIGHT: 67 IN | DIASTOLIC BLOOD PRESSURE: 74 MMHG | BODY MASS INDEX: 26.64 KG/M2

## 2024-01-30 DIAGNOSIS — N64.4 BREAST PAIN: ICD-10-CM

## 2024-01-30 DIAGNOSIS — Z00.00 ANNUAL PHYSICAL EXAM: ICD-10-CM

## 2024-01-30 DIAGNOSIS — Z13.29 THYROID DISORDER SCREEN: ICD-10-CM

## 2024-01-30 DIAGNOSIS — E55.9 VITAMIN D DEFICIENCY: ICD-10-CM

## 2024-01-30 DIAGNOSIS — E78.00 HYPERCHOLESTEREMIA: ICD-10-CM

## 2024-01-30 PROCEDURE — 3074F SYST BP LT 130 MM HG: CPT | Performed by: INTERNAL MEDICINE

## 2024-01-30 PROCEDURE — 3078F DIAST BP <80 MM HG: CPT | Performed by: INTERNAL MEDICINE

## 2024-01-30 PROCEDURE — 99396 PREV VISIT EST AGE 40-64: CPT | Performed by: INTERNAL MEDICINE

## 2024-01-30 RX ORDER — HYDROCODONE BITARTRATE AND ACETAMINOPHEN 5; 325 MG/1; MG/1
TABLET ORAL
COMMUNITY
Start: 2023-11-06 | End: 2024-01-30

## 2024-01-30 ASSESSMENT — FIBROSIS 4 INDEX: FIB4 SCORE: 1.35

## 2024-01-30 NOTE — PROGRESS NOTES
Subjective:     CC:   Chief Complaint   Patient presents with    Breast Pain     Right     Annual Exam     Hysterectomy([facility] )     HPI:  Jennie Morales is a 43 y.o. female who presents for annual exam. She is feeling well and denies any complaints.    Ob-Gyn/ History:    Patient has GYN provider: Yes   /Para:    Gyn Surgery: hysterectomy   No significant bloating/fluid retention, pelvic pain, or dyspareunia. No vaginal discharge    Health Maintenance  Cholesterol Screening: pending   Diabetes Screening: BG WNL   Diet: pretty healthy, trying to become vegetarian   Exercise: 1-2/mo   Substance Abuse: none   Safe in relationship.   Seat belts, bike helmet, gun safety discussed.  Sun protection used.     Cancer screening  Colorectal Cancer Screenin y.o.  Cervical Cancer Screening: She had a hysterectomy, she is not certain whether she still has cervix, she will confirm with her gynecologist whether she still needs Pap smears  Breast Cancer Screening: Abnormal mammogram in , results were sent to her gynecologist    Infectious disease screening/Immunizations  --STI Screening: not interested   --Practices safe sex.  --HIV Screening: not interested   --Hepatitis C Screening: not interested   --Immunizations:    Influenza: not interested    Tetanus: Due, will get through the pharmacy   Shingles: n/a   COVID-19: x2    Hepatitis B: Up-to-date per patient    She  has a past medical history of Anemia, Fibroids, Hyperlipidemia, Menometrorrhagia (2017), Urinary incontinence, and Uterine leiomyoma (3/27/2017).  She  has a past surgical history that includes gyn surgery and hysterectomy laparoscopy.    Family History   Problem Relation Age of Onset    Diabetes Father     Hyperlipidemia Father     Hyperlipidemia Mother     Psychiatric Illness Mother     Cancer Sister         cervical cance    Cancer Maternal Grandmother     Hyperlipidemia Sister     Psychiatric Illness Sister      Heart Disease Sister     Heart Disease Brother     Thyroid Neg Hx        Social History     Socioeconomic History    Marital status: Single     Spouse name: Not on file    Number of children: Not on file    Years of education: Not on file    Highest education level: GED or equivalent   Occupational History    Not on file   Tobacco Use    Smoking status: Never    Smokeless tobacco: Never   Vaping Use    Vaping Use: Never used   Substance and Sexual Activity    Alcohol use: Yes     Alcohol/week: 1.2 oz     Types: 2 Glasses of wine per week     Comment: occ    Drug use: No    Sexual activity: Yes     Partners: Male     Comment: none    Other Topics Concern    Not on file   Social History Narrative    Not on file     Social Determinants of Health     Financial Resource Strain: Medium Risk (1/29/2024)    Overall Financial Resource Strain (CARDIA)     Difficulty of Paying Living Expenses: Somewhat hard   Food Insecurity: Food Insecurity Present (1/29/2024)    Hunger Vital Sign     Worried About Running Out of Food in the Last Year: Sometimes true     Ran Out of Food in the Last Year: Sometimes true   Transportation Needs: No Transportation Needs (1/29/2024)    PRAPARE - Transportation     Lack of Transportation (Medical): No     Lack of Transportation (Non-Medical): No   Physical Activity: Insufficiently Active (1/29/2024)    Exercise Vital Sign     Days of Exercise per Week: 2 days     Minutes of Exercise per Session: 30 min   Stress: Stress Concern Present (1/29/2024)    [employer] [place] [employer] - Occupational Stress Questionnaire     Feeling of Stress : Very much   Social Connections: Moderately Isolated (1/29/2024)    Social Connection and Isolation Panel [NHANES]     Frequency of Communication with Friends and Family: Once a week     Frequency of Social Gatherings with Friends and Family: Twice a week     Attends Congregation Services: 1 to 4 times per year     Active Member of Clubs or  "Organizations: No     Attends Club or Organization Meetings: Never     Marital Status:    Intimate Partner Violence: Not on file   Housing Stability: High Risk (1/29/2024)    Housing Stability Vital Sign     Unable to Pay for Housing in the Last Year: Yes     Number of Places Lived in the Last Year: 2     Unstable Housing in the Last Year: No     Patient Active Problem List    Diagnosis Date Noted    Breast pain 01/30/2024    H/O total hysterectomy 12/08/2022    Reactive depression 04/14/2022    Hair loss 03/16/2022    Cyst of left ovary 03/02/2022    Elevated blood pressure reading 02/07/2022    Iron deficiency anemia due to chronic blood loss 02/07/2022    Hypercholesteremia 09/29/2020    Vitamin D deficiency 09/29/2020    TB lung, latent 10/12/2017    Health care maintenance 03/20/2017     Current Outpatient Medications   Medication Sig Dispense Refill    rosuvastatin (CRESTOR) 10 MG Tab Take 1 Tablet by mouth every evening. 90 Tablet 3    VITAMIN D PO Take  by mouth.      multivitamin (THERAGRAN) Tab Take 1 Tablet by mouth every day.       No current facility-administered medications for this visit.     No Known Allergies    Review of Systems  Constitutional: Negative for fever, chills and malaise/fatigue.   HENT: Negative for congestion.    Eyes: Negative for pain.    Respiratory: Negative for cough and shortness of breath.  Cardiovascular: Negative for leg swelling.   Gastrointestinal: Negative for nausea, vomiting, abdominal pain and diarrhea.   Genitourinary: Negative for dysuria and hematuria.   Skin: Negative for rash.   Neurological: Negative for dizziness, focal weakness and headaches.   Endo/Heme/Allergies: Does not bleed easily.   Psychiatric/Behavioral: Negative for depression.  The patient is not nervous/anxious.      Objective:     /74 (BP Location: Left arm, Patient Position: Sitting, BP Cuff Size: Adult)   Pulse 71   Temp 37 °C (98.6 °F) (Temporal)   Ht 1.702 m (5' 7\")   Wt 77 kg " (169 lb 12.1 oz)   SpO2 100%   BMI 26.59 kg/m²   Body mass index is 26.59 kg/m².  Wt Readings from Last 4 Encounters:   01/30/24 77 kg (169 lb 12.1 oz)   12/08/22 76.1 kg (167 lb 12.3 oz)   05/12/22 70.3 kg (154 lb 15.7 oz)   04/23/22 69.5 kg (153 lb 3.5 oz)     Physical Exam  Exam conducted with a chaperone present (Luz).   Constitutional:       General: She is not in acute distress.     Appearance: Normal appearance. She is not ill-appearing, toxic-appearing or diaphoretic.   HENT:      Head: Normocephalic and atraumatic.      Right Ear: Tympanic membrane, ear canal and external ear normal.      Left Ear: Tympanic membrane, ear canal and external ear normal.      Nose: Nose normal. No congestion.      Mouth/Throat:      Mouth: Mucous membranes are moist.      Pharynx: Oropharynx is clear. No oropharyngeal exudate.   Eyes:      Conjunctiva/sclera: Conjunctivae normal.      Pupils: Pupils are equal, round, and reactive to light.   Cardiovascular:      Rate and Rhythm: Normal rate and regular rhythm.      Pulses: Normal pulses.      Heart sounds: Normal heart sounds. No murmur heard.  Pulmonary:      Effort: Pulmonary effort is normal. No respiratory distress.      Breath sounds: Normal breath sounds.   Chest:      Chest wall: No mass.   Breasts:     Right: Nipple discharge and tenderness present. No swelling, bleeding, inverted nipple, mass or skin change.      Left: No swelling, bleeding, inverted nipple, mass or skin change.   Abdominal:      General: There is no distension.      Tenderness: There is no abdominal tenderness.   Musculoskeletal:         General: Normal range of motion.      Right lower leg: No edema.      Left lower leg: No edema.   Lymphadenopathy:      Upper Body:      Right upper body: No supraclavicular, axillary or pectoral adenopathy.      Left upper body: No supraclavicular, axillary or pectoral adenopathy.   Skin:     General: Skin is warm.      Capillary Refill: Capillary refill takes  less than 2 seconds.   Neurological:      General: No focal deficit present.      Mental Status: She is alert and oriented to person, place, and time.      Cranial Nerves: No cranial nerve deficit.      Motor: No weakness.   Psychiatric:         Mood and Affect: Mood normal.       Assessment and Plan:     Problem List Items Addressed This Visit       Hypercholesteremia (Chronic)    Relevant Orders    Lipid Profile    Breast pain    Relevant Orders    MA-DIAGNOSTIC MAMMO BILAT W/TOMOSYNTHESIS W/CAD    Vitamin D deficiency    Relevant Orders    VITAMIN D,25 HYDROXY (DEFICIENCY)     Other Visit Diagnoses       Annual physical exam        Relevant Orders    CBC WITH DIFFERENTIAL    Comp Metabolic Panel    Thyroid disorder screen        Relevant Orders    TSH WITH REFLEX TO FT4          HCM:  as above   Labs per orders  Immunizations per orders  Patient counseled about skin care, diet, supplements, prenatal vitamins, safe sex and exercise.    Follow-up: Return if symptoms worsen or fail to improve, for pending labs .    Please note that this dictation was created using voice recognition software. I have made every reasonable attempt to correct obvious errors, but I expect that there are errors of grammar and possibly content that I did not discover before finalizing the note.

## 2024-08-13 ENCOUNTER — PATIENT MESSAGE (OUTPATIENT)
Dept: HEALTH INFORMATION MANAGEMENT | Facility: OTHER | Age: 44
End: 2024-08-13

## 2024-08-18 ENCOUNTER — APPOINTMENT (OUTPATIENT)
Dept: RADIOLOGY | Facility: MEDICAL CENTER | Age: 44
End: 2024-08-18
Attending: EMERGENCY MEDICINE
Payer: COMMERCIAL

## 2024-08-18 ENCOUNTER — HOSPITAL ENCOUNTER (EMERGENCY)
Facility: MEDICAL CENTER | Age: 44
End: 2024-08-18
Attending: EMERGENCY MEDICINE
Payer: COMMERCIAL

## 2024-08-18 VITALS
SYSTOLIC BLOOD PRESSURE: 137 MMHG | BODY MASS INDEX: 26.53 KG/M2 | OXYGEN SATURATION: 98 % | RESPIRATION RATE: 16 BRPM | TEMPERATURE: 97 F | HEIGHT: 67 IN | DIASTOLIC BLOOD PRESSURE: 79 MMHG | HEART RATE: 57 BPM | WEIGHT: 169 LBS

## 2024-08-18 DIAGNOSIS — S20.211A CONTUSION OF RIGHT CHEST WALL, INITIAL ENCOUNTER: ICD-10-CM

## 2024-08-18 PROCEDURE — 71045 X-RAY EXAM CHEST 1 VIEW: CPT

## 2024-08-18 PROCEDURE — 99284 EMERGENCY DEPT VISIT MOD MDM: CPT

## 2024-08-18 PROCEDURE — A9270 NON-COVERED ITEM OR SERVICE: HCPCS | Performed by: EMERGENCY MEDICINE

## 2024-08-18 PROCEDURE — 307740 HCHG GREEN TRAUMA TEAM SERVICES

## 2024-08-18 PROCEDURE — 700102 HCHG RX REV CODE 250 W/ 637 OVERRIDE(OP): Performed by: EMERGENCY MEDICINE

## 2024-08-18 RX ORDER — ACETAMINOPHEN 325 MG/1
650 TABLET ORAL ONCE
Status: COMPLETED | OUTPATIENT
Start: 2024-08-18 | End: 2024-08-18

## 2024-08-18 RX ADMIN — ACETAMINOPHEN 650 MG: 325 TABLET ORAL at 09:08

## 2024-08-18 ASSESSMENT — PAIN DESCRIPTION - PAIN TYPE: TYPE: ACUTE PAIN
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
## 2024-08-19 ENCOUNTER — OFFICE VISIT (OUTPATIENT)
Dept: URGENT CARE | Facility: PHYSICIAN GROUP | Age: 44
End: 2024-08-19
Payer: COMMERCIAL

## 2024-08-19 VITALS
OXYGEN SATURATION: 99 % | TEMPERATURE: 97.6 F | HEART RATE: 68 BPM | DIASTOLIC BLOOD PRESSURE: 68 MMHG | WEIGHT: 163.2 LBS | BODY MASS INDEX: 24.17 KG/M2 | RESPIRATION RATE: 14 BRPM | HEIGHT: 69 IN | SYSTOLIC BLOOD PRESSURE: 104 MMHG

## 2024-08-19 DIAGNOSIS — S16.1XXA STRAIN OF NECK MUSCLE, INITIAL ENCOUNTER: ICD-10-CM

## 2024-08-19 DIAGNOSIS — G44.209 ACUTE NON INTRACTABLE TENSION-TYPE HEADACHE: ICD-10-CM
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
## 2024-08-19 PROCEDURE — 3074F SYST BP LT 130 MM HG: CPT | Performed by: NURSE PRACTITIONER

## 2024-08-19 PROCEDURE — 99213 OFFICE O/P EST LOW 20 MIN: CPT | Performed by: NURSE PRACTITIONER

## 2024-08-19 PROCEDURE — 3078F DIAST BP <80 MM HG: CPT | Performed by: NURSE PRACTITIONER

## 2024-08-19 RX ORDER — LIDOCAINE 4 G/G
2 PATCH TOPICAL EVERY 24 HOURS
Qty: 28 PATCH | Refills: 0 | Status: SHIPPED | OUTPATIENT
Start: 2024-08-19 | End: 2024-09-02

## 2024-08-19 ASSESSMENT — ENCOUNTER SYMPTOMS
FEVER: 0
HEADACHES: 1
CHILLS: 0
NECK PAIN: 1
MYALGIAS: 1
BACK PAIN: 1
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
## 2024-08-19 NOTE — LETTER
August 19, 2024    To Whom It May Concern:         This is confirmation that Jennie Morlaes attended her scheduled appointment with LULÚ Aragon on 8/19/24. It's my medical opinion that this patient would benefit from rest and recuperation for 3 days. May return to work/school/ on 08/22/24, or sooner if feeling better.           If you have any questions please do not hesitate to call me at the phone number listed below.    Sincerely,          WILIAN Aragon.  [phone]

## 2024-08-19 NOTE — PATIENT INSTRUCTIONS
Voltaren Gel   Icy Hot   Biofreeze    Soak in Epsom Salt and warm water    Tylenol 500mg Three times a day, every 8 hours. Max dose is 3000mg     Ibuprofen OR Aleve NOT BOTH.     Ibu 800mg is max dose at one time. No sooner than 8hours.     OR   Aleve one dose in the morning, one in the evening.     [place], OTC Lidocaine patches. Wear for 12 hours and remove for 12 hours.

## 2024-09-23 ENCOUNTER — APPOINTMENT (OUTPATIENT)
Dept: MEDICAL GROUP | Facility: MEDICAL CENTER | Age: 44
End: 2024-09-23
Payer: COMMERCIAL

## 2024-09-23 VITALS
HEART RATE: 75 BPM | SYSTOLIC BLOOD PRESSURE: 92 MMHG | OXYGEN SATURATION: 100 % | DIASTOLIC BLOOD PRESSURE: 60 MMHG | BODY MASS INDEX: 24.94 KG/M2 | WEIGHT: 168.4 LBS | HEIGHT: 69 IN

## 2024-09-23 DIAGNOSIS — M54.9 UPPER BACK PAIN: ICD-10-CM

## 2024-09-23 DIAGNOSIS — Z23 NEED FOR VACCINATION: ICD-10-CM

## 2024-09-23 DIAGNOSIS — E78.00 HYPERCHOLESTEREMIA: Chronic | ICD-10-CM

## 2024-09-23 DIAGNOSIS — S39.92XS INJURY OF LOW BACK, SEQUELA: ICD-10-CM

## 2024-09-23 PROCEDURE — 99214 OFFICE O/P EST MOD 30 MIN: CPT | Mod: 25 | Performed by: INTERNAL MEDICINE

## 2024-09-23 PROCEDURE — 90656 IIV3 VACC NO PRSV 0.5 ML IM: CPT | Performed by: INTERNAL MEDICINE

## 2024-09-23 PROCEDURE — 90471 IMMUNIZATION ADMIN: CPT | Performed by: INTERNAL MEDICINE

## 2024-09-23 PROCEDURE — 3078F DIAST BP <80 MM HG: CPT | Performed by: INTERNAL MEDICINE

## 2024-09-23 PROCEDURE — 3074F SYST BP LT 130 MM HG: CPT | Performed by: INTERNAL MEDICINE

## 2024-09-23 RX ORDER — MELOXICAM 7.5 MG/1
7.5-15 TABLET ORAL DAILY
Qty: 60 TABLET | Refills: 1 | Status: SHIPPED | OUTPATIENT
Start: 2024-09-23

## 2024-09-23 RX ORDER — CYCLOBENZAPRINE HCL 5 MG
5 TABLET ORAL 3 TIMES DAILY PRN
Qty: 30 TABLET | Refills: 0 | Status: SHIPPED | OUTPATIENT
Start: 2024-09-23

## 2024-09-23 ASSESSMENT — ENCOUNTER SYMPTOMS: BACK PAIN: 1

## 2024-09-23 ASSESSMENT — PATIENT HEALTH QUESTIONNAIRE - PHQ9
5. POOR APPETITE OR OVEREATING: SEVERAL DAYS
7. TROUBLE CONCENTRATING ON THINGS, SUCH AS READING THE NEWSPAPER OR WATCHING TELEVISION: SEVERAL DAYS
4. FEELING TIRED OR HAVING LITTLE ENERGY: NOT AT ALL
1. LITTLE INTEREST OR PLEASURE IN DOING THINGS: NOT AT ALL
SUM OF ALL RESPONSES TO PHQ QUESTIONS 1-9: 3
SUM OF ALL RESPONSES TO PHQ9 QUESTIONS 1 AND 2: 0
6. FEELING BAD ABOUT YOURSELF - OR THAT YOU ARE A FAILURE OR HAVE LET YOURSELF OR YOUR FAMILY DOWN: NOT AL ALL
3. TROUBLE FALLING OR STAYING ASLEEP OR SLEEPING TOO MUCH: NOT AT ALL
8. MOVING OR SPEAKING SO SLOWLY THAT OTHER PEOPLE COULD HAVE NOTICED. OR THE OPPOSITE, BEING SO FIGETY OR RESTLESS THAT YOU HAVE BEEN MOVING AROUND A LOT MORE THAN USUAL: SEVERAL DAYS
1. LITTLE INTEREST OR PLEASURE IN DOING THINGS: NOT AT ALL
2. FEELING DOWN, DEPRESSED, IRRITABLE, OR HOPELESS: NOT AT ALL
2. FEELING DOWN, DEPRESSED, IRRITABLE, OR HOPELESS: NOT AT ALL
SUM OF ALL RESPONSES TO PHQ9 QUESTIONS 1 AND 2: 0
9. THOUGHTS THAT YOU WOULD BE BETTER OFF DEAD, OR OF HURTING YOURSELF: NOT AT ALL
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
## 2024-09-26 ENCOUNTER — HOSPITAL ENCOUNTER (OUTPATIENT)
Dept: LAB | Facility: MEDICAL CENTER | Age: 44
End: 2024-09-26
Attending: INTERNAL MEDICINE
Payer: COMMERCIAL

## 2024-09-26 ENCOUNTER — APPOINTMENT (OUTPATIENT)
Dept: MEDICAL GROUP | Facility: MEDICAL CENTER | Age: 44
End: 2024-09-26
Payer: OTHER MISCELLANEOUS

## 2024-09-26 VITALS
DIASTOLIC BLOOD PRESSURE: 60 MMHG | BODY MASS INDEX: 24.29 KG/M2 | OXYGEN SATURATION: 99 % | HEART RATE: 80 BPM | HEIGHT: 69 IN | WEIGHT: 164 LBS | SYSTOLIC BLOOD PRESSURE: 98 MMHG

## 2024-09-26 DIAGNOSIS — E78.00 HYPERCHOLESTEREMIA: ICD-10-CM

## 2024-09-26 DIAGNOSIS — Z00.00 ANNUAL PHYSICAL EXAM: ICD-10-CM

## 2024-09-26 DIAGNOSIS — E55.9 VITAMIN D DEFICIENCY: ICD-10-CM

## 2024-09-26 DIAGNOSIS — Z12.11 COLON CANCER SCREENING: ICD-10-CM

## 2024-09-26 DIAGNOSIS — N64.4 BREAST PAIN: ICD-10-CM

## 2024-09-26 DIAGNOSIS — Z23 NEED FOR VACCINATION: ICD-10-CM

## 2024-09-26 DIAGNOSIS — Z13.29 THYROID DISORDER SCREEN: ICD-10-CM

## 2024-09-26 DIAGNOSIS — E78.00 HYPERCHOLESTEREMIA: Primary | ICD-10-CM

## 2024-09-26 DIAGNOSIS — N63.0 BREAST LUMP IN FEMALE: ICD-10-CM

## 2024-09-26 LAB
25(OH)D3 SERPL-MCNC: 20 NG/ML (ref 30–100)
ALBUMIN SERPL BCP-MCNC: 3.8 G/DL (ref 3.2–4.9)
ALBUMIN/GLOB SERPL: 1.1 G/DL
ALP SERPL-CCNC: 50 U/L (ref 30–99)
ALT SERPL-CCNC: 15 U/L (ref 2–50)
ANION GAP SERPL CALC-SCNC: 11 MMOL/L (ref 7–16)
AST SERPL-CCNC: 30 U/L (ref 12–45)
BASOPHILS # BLD AUTO: 0.6 % (ref 0–1.8)
BASOPHILS # BLD: 0.03 K/UL (ref 0–0.12)
BILIRUB SERPL-MCNC: 0.3 MG/DL (ref 0.1–1.5)
BUN SERPL-MCNC: 13 MG/DL (ref 8–22)
CALCIUM ALBUM COR SERPL-MCNC: 9.3 MG/DL (ref 8.5–10.5)
CALCIUM SERPL-MCNC: 9.1 MG/DL (ref 8.4–10.2)
CHLORIDE SERPL-SCNC: 103 MMOL/L (ref 96–112)
CHOLEST SERPL-MCNC: 302 MG/DL (ref 100–199)
CO2 SERPL-SCNC: 22 MMOL/L (ref 20–33)
CREAT SERPL-MCNC: 0.94 MG/DL (ref 0.5–1.4)
EOSINOPHIL # BLD AUTO: 0.03 K/UL (ref 0–0.51)
EOSINOPHIL NFR BLD: 0.6 % (ref 0–6.9)
ERYTHROCYTE [DISTWIDTH] IN BLOOD BY AUTOMATED COUNT: 45.1 FL (ref 35.9–50)
FASTING STATUS PATIENT QL REPORTED: NORMAL
GFR SERPLBLD CREATININE-BSD FMLA CKD-EPI: 76 ML/MIN/1.73 M 2
GLOBULIN SER CALC-MCNC: 3.5 G/DL (ref 1.9–3.5)
GLUCOSE SERPL-MCNC: 85 MG/DL (ref 65–99)
HCT VFR BLD AUTO: 40.8 % (ref 37–47)
HDLC SERPL-MCNC: 96 MG/DL
HGB BLD-MCNC: 13.2 G/DL (ref 12–16)
IMM GRANULOCYTES # BLD AUTO: 0.01 K/UL (ref 0–0.11)
IMM GRANULOCYTES NFR BLD AUTO: 0.2 % (ref 0–0.9)
LDLC SERPL CALC-MCNC: 192 MG/DL
LYMPHOCYTES # BLD AUTO: 1.73 K/UL (ref 1–4.8)
LYMPHOCYTES NFR BLD: 36.4 % (ref 22–41)
MCH RBC QN AUTO: 28.9 PG (ref 27–33)
MCHC RBC AUTO-ENTMCNC: 32.4 G/DL (ref 32.2–35.5)
MCV RBC AUTO: 89.3 FL (ref 81.4–97.8)
MONOCYTES # BLD AUTO: 0.38 K/UL (ref 0–0.85)
MONOCYTES NFR BLD AUTO: 8 % (ref 0–13.4)
NEUTROPHILS # BLD AUTO: 2.57 K/UL (ref 1.82–7.42)
NEUTROPHILS NFR BLD: 54.2 % (ref 44–72)
NRBC # BLD AUTO: 0 K/UL
NRBC BLD-RTO: 0 /100 WBC (ref 0–0.2)
PLATELET # BLD AUTO: 226 K/UL (ref 164–446)
PMV BLD AUTO: 9.6 FL (ref 9–12.9)
POTASSIUM SERPL-SCNC: 3.8 MMOL/L (ref 3.6–5.5)
PROT SERPL-MCNC: 7.3 G/DL (ref 6–8.2)
RBC # BLD AUTO: 4.57 M/UL (ref 4.2–5.4)
SODIUM SERPL-SCNC: 136 MMOL/L (ref 135–145)
TRIGL SERPL-MCNC: 72 MG/DL (ref 0–149)
TSH SERPL DL<=0.005 MIU/L-ACNC: 1.64 UIU/ML (ref 0.38–5.33)
WBC # BLD AUTO: 4.8 K/UL (ref 4.8–10.8)

## 2024-09-26 PROCEDURE — 90471 IMMUNIZATION ADMIN: CPT | Performed by: INTERNAL MEDICINE

## 2024-09-26 PROCEDURE — 90715 TDAP VACCINE 7 YRS/> IM: CPT | Performed by: INTERNAL MEDICINE

## 2024-09-26 PROCEDURE — 36415 COLL VENOUS BLD VENIPUNCTURE: CPT

## 2024-09-26 PROCEDURE — 80061 LIPID PANEL: CPT

## 2024-09-26 PROCEDURE — 3074F SYST BP LT 130 MM HG: CPT | Performed by: INTERNAL MEDICINE

## 2024-09-26 PROCEDURE — 99396 PREV VISIT EST AGE 40-64: CPT | Mod: 25 | Performed by: INTERNAL MEDICINE

## 2024-09-26 PROCEDURE — 84443 ASSAY THYROID STIM HORMONE: CPT

## 2024-09-26 PROCEDURE — 80053 COMPREHEN METABOLIC PANEL: CPT

## 2024-09-26 PROCEDURE — 3078F DIAST BP <80 MM HG: CPT | Performed by: INTERNAL MEDICINE

## 2024-09-26 PROCEDURE — 85025 COMPLETE CBC W/AUTO DIFF WBC: CPT

## 2024-09-26 PROCEDURE — 82306 VITAMIN D 25 HYDROXY: CPT

## 2024-09-26 NOTE — PROGRESS NOTES
Lab Results   Component Value Date/Time    CHOLSTRLTOT 302 (H) 09/26/2024 07:39 AM     (H) 09/26/2024 07:39 AM    HDL 96 09/26/2024 07:39 AM    TRIGLYCERIDE 72 09/26/2024 07:39 AM       Lab Results   Component Value Date/Time    SODIUM 136 09/26/2024 07:39 AM    POTASSIUM 3.8 09/26/2024 07:39 AM    CHLORIDE 103 09/26/2024 07:39 AM    CO2 22 09/26/2024 07:39 AM    GLUCOSE 85 09/26/2024 07:39 AM    BUN 13 09/26/2024 07:39 AM    CREATININE 0.94 09/26/2024 07:39 AM     Lab Results   Component Value Date/Time    ALKPHOSPHAT 50 09/26/2024 07:39 AM    ASTSGOT 30 09/26/2024 07:39 AM    ALTSGPT 15 09/26/2024 07:39 AM    TBILIRUBIN 0.3 09/26/2024 07:39 AM      Liz Wyatt M.D.

## 2024-09-26 NOTE — PROGRESS NOTES
Subjective:     CC:   Chief Complaint   Patient presents with    Annual Exam     Verbal consent was acquired by the patient to use [employer] ambient listening note generation during this visit Yes     History of Present Illness  The patient is a 43-year-old female who presents for an annual physical.    She has not had a Pap smear since her hysterectomy surgery, despite retaining her cervix. She recently underwent a mammogram and reports a cyst in her breast, which causes intermittent nipple sensitivity. She has not had a full body skin exam with a dermatologist.    She maintains a healthy diet, rich in seafood, and eats 1 to 1.5 times a day due to her work schedule. She also snacks between meals. Despite her desire to visit the gym, she only manages to go once a week due to time constraints.  SOCIAL HISTORY  She does not smoke. She drinks alcohol occasionally.    FAMILY HISTORY  There is no family history of colon cancer.    Ob-Gyn/ History:    Patient has GYN provider: no   /Para:  /  Last Pap Smear:  due, will schedule with office   Gyn Surgery: Hysterectomy.  Current Contraceptive Method: none.  No significant bloating/fluid retention, pelvic pain, or dyspareunia. No vaginal discharge    Health Maintenance  Cholesterol Screening: on statin   Diabetes Screening:   Diet: seafood, healthy    Exercise: 1/week  Substance Abuse: none   Safe in relationship.  Seat belts, bike helmet, gun safety discussed.  Sun protection used.    Cancer screening  Colorectal Cancer Screening: due on 2024, ordered   Lung Cancer Screening: n/a   Cervical Cancer Screening: will schedule with office   Breast Cancer Screenin2024, recall     Infectious disease screening/Immunizations  --STI Screening: deferred    --Practices safe sex.  --HIV Screening: deferred    --Hepatitis C Screening: deferred    --Immunizations:    Influenza: UTD    Tetanus: will receive today    Shingles: n/a   Pneumococcal: n/a    COVID-19: x3     Hepatitis B: x3     She  has a past medical history of Anemia, Fibroids, Hyperlipidemia, Menometrorrhagia (7/14/2017), Urinary incontinence, and Uterine leiomyoma (3/27/2017).  She  has a past surgical history that includes gyn surgery and hysterectomy laparoscopy.    Family History   Problem Relation Age of Onset    Diabetes Father     Hyperlipidemia Father     Hyperlipidemia Mother     Psychiatric Illness Mother     Cancer Sister         cervical cance    Cancer Maternal Grandmother     Hyperlipidemia Sister     Psychiatric Illness Sister     Heart Disease Sister     Heart Disease Brother     Thyroid Neg Hx        Social History     Socioeconomic History    Marital status: Single     Spouse name: Not on file    Number of children: Not on file    Years of education: Not on file    Highest education level: GED or equivalent   Occupational History    Not on file   Tobacco Use    Smoking status: Never    Smokeless tobacco: Never   Vaping Use    Vaping status: Never Used   Substance and Sexual Activity    Alcohol use: Yes     Alcohol/week: 1.2 oz     Types: 2 Glasses of wine per week     Comment: occ    Drug use: No    Sexual activity: Yes     Partners: Male     Comment: none    Other Topics Concern    Not on file   Social History Narrative    Not on file     Social Determinants of Health     Financial Resource Strain: Medium Risk (1/29/2024)    Overall Financial Resource Strain (CARDIA)     Difficulty of Paying Living Expenses: Somewhat hard   Food Insecurity: Food Insecurity Present (1/29/2024)    Hunger Vital Sign     Worried About Running Out of Food in the Last Year: Sometimes true     Ran Out of Food in the Last Year: Sometimes true   Transportation Needs: No Transportation Needs (1/29/2024)    PRAPARE - Transportation     Lack of Transportation (Medical): No     Lack of Transportation (Non-Medical): No   Physical Activity: Insufficiently Active (1/29/2024)    Exercise Vital Sign     Days of Exercise per Week:  2 days     Minutes of Exercise per Session: 30 min   Stress: Stress Concern Present (1/29/2024)    [employer] [place] [employer] - Occupational Stress Questionnaire     Feeling of Stress : Very much   Social Connections: Moderately Isolated (1/29/2024)    Social Connection and Isolation Panel [NHANES]     Frequency of Communication with Friends and Family: Once a week     Frequency of Social Gatherings with Friends and Family: Twice a week     Attends Sabianism Services: 1 to 4 times per year     Active Member of Clubs or Organizations: No     Attends Club or Organization Meetings: Never     Marital Status:    Intimate Partner Violence: Not on file   Housing Stability: High Risk (1/29/2024)    Housing Stability Vital Sign     Unable to Pay for Housing in the Last Year: Yes     Number of Places Lived in the Last Year: 2     Unstable Housing in the Last Year: No     Patient Active Problem List    Diagnosis Date Noted    Breast lump in female 09/26/2024    Breast pain 01/30/2024    H/O total hysterectomy 12/08/2022    Reactive depression 04/14/2022    Hair loss 03/16/2022    Cyst of left ovary 03/02/2022    Elevated blood pressure reading 02/07/2022    Iron deficiency anemia due to chronic blood loss 02/07/2022    Hypercholesteremia 09/29/2020    Vitamin D deficiency 09/29/2020    TB lung, latent 10/12/2017    Health care maintenance 03/20/2017     Current Outpatient Medications   Medication Sig Dispense Refill    meloxicam (MOBIC) 7.5 MG Tab Take 1-2 Tablets by mouth every day. 60 Tablet 1    cyclobenzaprine (FLEXERIL) 5 mg tablet Take 1 Tablet by mouth 3 times a day as needed for Moderate Pain or Muscle Spasms. 30 Tablet 0    rosuvastatin (CRESTOR) 10 MG Tab Take 1 Tablet by mouth every evening. (Patient not taking: Reported on 8/19/2024) 90 Tablet 3    VITAMIN D PO Take  by mouth. (Patient not taking: Reported on 8/19/2024)      multivitamin (THERAGRAN) Tab Take 1 Tablet by mouth every day.  "(Patient not taking: Reported on 8/19/2024)       No current facility-administered medications for this visit.     No Known Allergies    Review of Systems:  Constitutional: Negative for fever, chills and malaise/fatigue.   HENT: Negative for congestion.    Eyes: Negative for pain.    Respiratory: Negative for cough and shortness of breath.  Cardiovascular: Negative for leg swelling.   Gastrointestinal: Negative for nausea, vomiting, abdominal pain and diarrhea.   Genitourinary: Negative for dysuria and hematuria.   Skin: Negative for rash.   Neurological: Negative for dizziness, focal weakness and headaches.   Endo/Heme/Allergies: Does not bleed easily.   Psychiatric/Behavioral: Negative for depression.  The patient is not nervous/anxious.      Objective:     BP 98/60   Pulse 80   Ht 1.753 m (5' 9\")   Wt 74.4 kg (164 lb)   SpO2 99%   BMI 24.22 kg/m²   Body mass index is 24.22 kg/m².  Wt Readings from Last 4 Encounters:   09/26/24 74.4 kg (164 lb)   09/23/24 76.4 kg (168 lb 6.4 oz)   08/19/24 74 kg (163 lb 3.2 oz)   08/18/24 76.7 kg (169 lb)     Physical Exam  Constitutional:       Appearance: Normal appearance.   HENT:      Head: Normocephalic and atraumatic.      Right Ear: Tympanic membrane, ear canal and external ear normal. There is no impacted cerumen.      Left Ear: Tympanic membrane, ear canal and external ear normal. There is no impacted cerumen.      Nose: Nose normal. No congestion.      Mouth/Throat:      Mouth: Mucous membranes are moist.      Pharynx: Oropharynx is clear.   Eyes:      Conjunctiva/sclera: Conjunctivae normal.      Pupils: Pupils are equal, round, and reactive to light.   Cardiovascular:      Rate and Rhythm: Normal rate and regular rhythm.      Pulses: Normal pulses.      Heart sounds: Normal heart sounds. No murmur heard.  Pulmonary:      Effort: Pulmonary effort is normal. No respiratory distress.      Breath sounds: Normal breath sounds. No wheezing.   Abdominal:      General: " Abdomen is flat. There is no distension.      Palpations: Abdomen is soft.      Tenderness: There is no abdominal tenderness.   Musculoskeletal:      Right lower leg: No edema.      Left lower leg: No edema.   Skin:     General: Skin is warm.      Capillary Refill: Capillary refill takes less than 2 seconds.   Neurological:      General: No focal deficit present.      Mental Status: She is alert and oriented to person, place, and time.   Psychiatric:         Mood and Affect: Mood normal.         Thought Content: Thought content normal.         Judgment: Judgment normal.       Assessment and Plan:     Problem List Items Addressed This Visit       Breast pain    Relevant Orders    US-BREAST BILAT-COMPLETE     Other Visit Diagnoses       Colon cancer screening        Relevant Orders    Cologuard® colon cancer screening    Need for vaccination        Relevant Orders    Tdap =>6yo IM (Completed)    Annual physical exam              HCM:  as above    Labs per orders  Immunizations per orders  Patient counseled about skin care, diet, supplements, prenatal vitamins, safe sex and exercise.      Follow-up: Return in about 1 year (around 9/26/2025), or if symptoms worsen or fail to improve, for annual physical.    Please note that this dictation was created using voice recognition software. I have made every reasonable attempt to correct obvious errors, but I expect that there are errors of grammar and possibly content that I did not discover before finalizing the note.

## 2024-11-14 ENCOUNTER — APPOINTMENT (OUTPATIENT)
Dept: MEDICAL GROUP | Facility: MEDICAL CENTER | Age: 44
End: 2024-11-14
Payer: COMMERCIAL

## 2024-12-20 LAB — NONINV COLON CA DNA+OCC BLD SCRN STL QL: NORMAL

## 2025-01-02 ENCOUNTER — TELEPHONE (OUTPATIENT)
Dept: MEDICAL GROUP | Age: 45
End: 2025-01-02
Payer: COMMERCIAL

## 2025-01-02 NOTE — TELEPHONE ENCOUNTER
Please remind patient to schedule ultrasound of the breast.  This is a 6 months follow-up from July 2024.  Thank you    Liz Wyatt M.D.

## 2025-01-02 NOTE — TELEPHONE ENCOUNTER
Phone Number Called: [phone]    Call outcome: Spoke to patient regarding message below.    Message: Reminded pt to schedule her ultrasound. She said she would schedule it.

## 2025-01-08 ENCOUNTER — OFFICE VISIT (OUTPATIENT)
Dept: MEDICAL GROUP | Age: 45
End: 2025-01-08
Payer: COMMERCIAL

## 2025-01-08 VITALS
BODY MASS INDEX: 24.73 KG/M2 | WEIGHT: 167 LBS | DIASTOLIC BLOOD PRESSURE: 72 MMHG | TEMPERATURE: 98.5 F | HEIGHT: 69 IN | SYSTOLIC BLOOD PRESSURE: 130 MMHG | OXYGEN SATURATION: 96 % | HEART RATE: 87 BPM

## 2025-01-08 DIAGNOSIS — J06.9 UPPER RESPIRATORY TRACT INFECTION, UNSPECIFIED TYPE: ICD-10-CM

## 2025-01-08 DIAGNOSIS — E55.9 VITAMIN D DEFICIENCY: ICD-10-CM

## 2025-01-08 DIAGNOSIS — E78.00 HYPERCHOLESTEREMIA: Chronic | ICD-10-CM

## 2025-01-08 PROCEDURE — 3075F SYST BP GE 130 - 139MM HG: CPT | Performed by: STUDENT IN AN ORGANIZED HEALTH CARE EDUCATION/TRAINING PROGRAM

## 2025-01-08 PROCEDURE — 99214 OFFICE O/P EST MOD 30 MIN: CPT | Performed by: STUDENT IN AN ORGANIZED HEALTH CARE EDUCATION/TRAINING PROGRAM

## 2025-01-08 PROCEDURE — 3078F DIAST BP <80 MM HG: CPT | Performed by: STUDENT IN AN ORGANIZED HEALTH CARE EDUCATION/TRAINING PROGRAM

## 2025-01-08 ASSESSMENT — ENCOUNTER SYMPTOMS
ORTHOPNEA: 0
WEAKNESS: 0
BRUISES/BLEEDS EASILY: 0
PALPITATIONS: 0
DEPRESSION: 0
BLURRED VISION: 0
HEMOPTYSIS: 0
BLOOD IN STOOL: 0
FEVER: 0
BACK PAIN: 0
WHEEZING: 0
DIZZINESS: 0
ABDOMINAL PAIN: 0
SHORTNESS OF BREATH: 0
SPUTUM PRODUCTION: 0
SINUS PAIN: 0
CHILLS: 0
HEARTBURN: 0
DOUBLE VISION: 0
HEADACHES: 0
MYALGIAS: 1
COUGH: 1

## 2025-01-08 ASSESSMENT — PATIENT HEALTH QUESTIONNAIRE - PHQ9
SUM OF ALL RESPONSES TO PHQ QUESTIONS 1-9: 5
CLINICAL INTERPRETATION OF PHQ2 SCORE: 1
5. POOR APPETITE OR OVEREATING: 1 - SEVERAL DAYS

## 2025-01-08 ASSESSMENT — FIBROSIS 4 INDEX: FIB4 SCORE: 1.54

## 2025-01-08 NOTE — LETTER
January 8, 2025    To Whom It May Concern:         This is confirmation that Jennie Morales attended her scheduled appointment with Oscar Estevez M.D. on 1/08/25.    Please excuse Jennie Morales from work the following days 1/6/2025 - 1/10/2025. Patient unable to attend work due to Illness. I recommend her to Isolate at home to prevent spread of disease. She can return back to work on Monday 1/12/2025.          If you have any questions please do not hesitate to call me at the phone number listed below.    Sincerely,          Oscar Estevez M.D.  [phone]

## 2025-01-08 NOTE — PROGRESS NOTES
"Subjective:     CC: Upper respiratory symptoms    HPI:   History of Present Illness  The patient is a 44-year-old female presenting for evaluation of diarrhea, cough, and fever.    She reports a general feeling of malaise, which has resulted in her absence from work for the past 3 days. Her symptoms include diarrhea, cough, and a mild fever, with a recorded temperature of 100.2 degrees. She also experienced body aches. Despite these symptoms, she reports an overall improvement in her condition. She describes her current state as akin to having a cold, characterized by weakness and fatigue. Her symptoms began approximately 2 days ago, and she anticipates returning to work next week.    She is currently on medication for high cholesterol but admits to occasional non-compliance. She maintains a healthy diet.       ROS:  Review of Systems   Constitutional:  Negative for chills, fever and malaise/fatigue.   HENT:  Positive for congestion. Negative for ear discharge, ear pain, nosebleeds, sinus pain and tinnitus.    Eyes:  Negative for blurred vision and double vision.   Respiratory:  Positive for cough. Negative for hemoptysis, sputum production, shortness of breath and wheezing.    Cardiovascular:  Negative for chest pain, palpitations, orthopnea and leg swelling.   Gastrointestinal:  Negative for abdominal pain, blood in stool, heartburn and melena.   Genitourinary:  Negative for dysuria and urgency.   Musculoskeletal:  Positive for myalgias. Negative for back pain and joint pain.   Skin:  Negative for rash.   Neurological:  Negative for dizziness, weakness and headaches.   Endo/Heme/Allergies:  Does not bruise/bleed easily.   Psychiatric/Behavioral:  Negative for depression and suicidal ideas.        Objective:     Exam:  /72 (BP Location: Left arm, Patient Position: Sitting, BP Cuff Size: Adult)   Pulse 87   Temp 36.9 °C (98.5 °F) (Temporal)   Ht 1.753 m (5' 9\")   Wt 75.8 kg (167 lb)   SpO2 96%   BMI " 24.66 kg/m²  Body mass index is 24.66 kg/m².    Physical Exam  Vitals reviewed.   Constitutional:       General: She is not in acute distress.  HENT:      Head: Normocephalic and atraumatic.      Right Ear: Tympanic membrane and ear canal normal.      Left Ear: Tympanic membrane and ear canal normal.      Nose: Congestion present. No rhinorrhea.      Mouth/Throat:      Mouth: Mucous membranes are moist.      Pharynx: Posterior oropharyngeal erythema present. No oropharyngeal exudate.        Comments: Evidence of couple tonsil stones in the left side.  Eyes:      General: No scleral icterus.     Extraocular Movements: Extraocular movements intact.      Pupils: Pupils are equal, round, and reactive to light.   Cardiovascular:      Rate and Rhythm: Normal rate and regular rhythm.      Pulses: Normal pulses.      Heart sounds: Normal heart sounds. No murmur heard.  Pulmonary:      Effort: Pulmonary effort is normal. No respiratory distress.      Breath sounds: Normal breath sounds. No wheezing.   Abdominal:      General: There is no distension.      Palpations: Abdomen is soft.      Tenderness: There is no abdominal tenderness. There is no guarding or rebound.   Musculoskeletal:      Cervical back: Normal range of motion and neck supple.      Right lower leg: No edema.      Left lower leg: No edema.   Skin:     General: Skin is warm.      Capillary Refill: Capillary refill takes less than 2 seconds.      Coloration: Skin is not jaundiced.   Neurological:      General: No focal deficit present.      Mental Status: She is alert.      Cranial Nerves: No cranial nerve deficit.      Sensory: No sensory deficit.   Psychiatric:         Mood and Affect: Mood normal.         Behavior: Behavior normal.       Labs: None    Assessment & Plan:     44 y.o. female with the following -     1. Upper respiratory tract infection, unspecified type  Her vital signs are within normal limits today. She reports feeling better but still  experiences some weakness and tiredness. Examination revealed inflamed tonsils and the presence of tonsil stones, which were removed during the visit. A work excuse note will be provided, recommending a return to work on Monday, 07/12/2025, after a week of being sick to ensure she is no longer contagious.  Continue with symptomatic treatment.    2. Hypercholesteremia  She has a history of elevated LDL cholesterol levels, exceeding 200, which is significantly high. She has been inconsistent with her cholesterol medication. She is advised to continue taking her cholesterol medication regularly.     3. Vitamin D deficiency.  Previous lab results indicated low vitamin D levels. She is advised to continue taking her vitamin D supplement.         Return if symptoms worsen or fail to improve.    Please note that this dictation was created using voice recognition software. I have made every reasonable attempt to correct obvious errors, but I expect that there are errors of grammar and possibly content that I did not discover before finalizing the note.

## 2025-01-09 ENCOUNTER — OFFICE VISIT (OUTPATIENT)
Dept: MEDICAL GROUP | Facility: MEDICAL CENTER | Age: 45
End: 2025-01-09
Payer: COMMERCIAL

## 2025-01-09 ENCOUNTER — HOSPITAL ENCOUNTER (OUTPATIENT)
Facility: MEDICAL CENTER | Age: 45
End: 2025-01-09
Attending: NURSE PRACTITIONER
Payer: COMMERCIAL

## 2025-01-09 VITALS
BODY MASS INDEX: 24.65 KG/M2 | DIASTOLIC BLOOD PRESSURE: 68 MMHG | TEMPERATURE: 98.8 F | HEIGHT: 69 IN | SYSTOLIC BLOOD PRESSURE: 122 MMHG | OXYGEN SATURATION: 98 % | HEART RATE: 75 BPM | WEIGHT: 166.45 LBS

## 2025-01-09 DIAGNOSIS — Z11.3 ROUTINE SCREENING FOR STI (SEXUALLY TRANSMITTED INFECTION): ICD-10-CM

## 2025-01-09 DIAGNOSIS — Z11.51 SCREENING FOR HPV (HUMAN PAPILLOMAVIRUS): ICD-10-CM

## 2025-01-09 DIAGNOSIS — Z01.419 WELL FEMALE EXAM WITH ROUTINE GYNECOLOGICAL EXAM: ICD-10-CM

## 2025-01-09 PROCEDURE — 88142 CYTOPATH C/V THIN LAYER: CPT

## 2025-01-09 PROCEDURE — 87591 N.GONORRHOEAE DNA AMP PROB: CPT

## 2025-01-09 PROCEDURE — 99396 PREV VISIT EST AGE 40-64: CPT | Performed by: NURSE PRACTITIONER

## 2025-01-09 PROCEDURE — 3078F DIAST BP <80 MM HG: CPT | Performed by: NURSE PRACTITIONER

## 2025-01-09 PROCEDURE — 87491 CHLMYD TRACH DNA AMP PROBE: CPT

## 2025-01-09 PROCEDURE — 3074F SYST BP LT 130 MM HG: CPT | Performed by: NURSE PRACTITIONER

## 2025-01-09 PROCEDURE — 87624 HPV HI-RISK TYP POOLED RSLT: CPT

## 2025-01-09 ASSESSMENT — FIBROSIS 4 INDEX: FIB4 SCORE: 1.54

## 2025-01-09 NOTE — PROGRESS NOTES
Established Patient    CC: Jennie Morales is a 44 y.o.,female who presents today for Pap and pelvic exam. No complaints today.    HPI:       History of Present Illness      Her LMP was 5 yrs ago, hx of hysterectomy 5 yrs ago.    She is having no menses.   Her menstrual history is unremarkable.   Her form of contraception is  hysterectomy  Hx of abnormal pelvic exams:  No .   Last Pap 5 yrs ago.  Hx of abnormal Pap no  OB History    Para Term  AB Living   1 1 1 0 0 1   SAB IAB Ectopic Molar Multiple Live Births   0 0 0 0 0 1      Social History     Substance and Sexual Activity   Sexual Activity Yes    Partners: Male    Comment: none      Sexual history: single partner   She  reports that she has never smoked. She has never used smokeless tobacco.  Patient denies any current symptoms: No dyspareunia, no itching, no discharge, and no lesions.  Patient denies fam Hx of GYN or breast cancers.   She is , P:1.   Previous pregnancies, without significant complications.   Patient denies any STD risks or concerns.   Patient denies any history of sexual abuse.   Patient denies breast masses or lesions .  Last mammo: 2024, 6 mos f/u on Right breast (due at 39 )    No problem-specific Assessment & Plan notes found for this encounter.          Allergies: Patient has no known allergies.    Current Outpatient Medications   Medication Sig Dispense Refill    meloxicam (MOBIC) 7.5 MG Tab Take 1-2 Tablets by mouth every day. 60 Tablet 1    cyclobenzaprine (FLEXERIL) 5 mg tablet Take 1 Tablet by mouth 3 times a day as needed for Moderate Pain or Muscle Spasms. 30 Tablet 0    rosuvastatin (CRESTOR) 10 MG Tab Take 1 Tablet by mouth every evening. 90 Tablet 3    VITAMIN D PO Take  by mouth.      multivitamin (THERAGRAN) Tab Take 1 Tablet by mouth every day.       No current facility-administered medications for this visit.     Patient Active Problem List    Diagnosis Date Noted    Breast lump in female 2024     Breast pain 01/30/2024    H/O total hysterectomy 12/08/2022    Reactive depression 04/14/2022    Hair loss 03/16/2022    Cyst of left ovary 03/02/2022    Elevated blood pressure reading 02/07/2022    Iron deficiency anemia due to chronic blood loss 02/07/2022    Hypercholesteremia 09/29/2020    Vitamin D deficiency 09/29/2020    TB lung, latent 10/12/2017    Health care maintenance 03/20/2017     Current Outpatient Medications on File Prior to Visit   Medication Sig Dispense Refill    meloxicam (MOBIC) 7.5 MG Tab Take 1-2 Tablets by mouth every day. 60 Tablet 1    cyclobenzaprine (FLEXERIL) 5 mg tablet Take 1 Tablet by mouth 3 times a day as needed for Moderate Pain or Muscle Spasms. 30 Tablet 0    rosuvastatin (CRESTOR) 10 MG Tab Take 1 Tablet by mouth every evening. 90 Tablet 3    VITAMIN D PO Take  by mouth.      multivitamin (THERAGRAN) Tab Take 1 Tablet by mouth every day.       No current facility-administered medications on file prior to visit.     Family History   Problem Relation Age of Onset    Diabetes Father     Hyperlipidemia Father     Hyperlipidemia Mother     Psychiatric Illness Mother     Cancer Sister         cervical cance    Cancer Maternal Grandmother     Hyperlipidemia Sister     Psychiatric Illness Sister     Heart Disease Sister     Heart Disease Brother     Thyroid Neg Hx      Social History     Tobacco Use    Smoking status: Never    Smokeless tobacco: Never   Substance Use Topics    Alcohol use: Yes     Alcohol/week: 1.2 oz     Types: 2 Glasses of wine per week     Comment: occ.       Allergies, past medical history, past surgical history, medications, family history, social history reviewed and updated.    Exercise: moderate regular exercise program  Her preventative health screens are up to date.    ROS:  Constitutional: Denies fevers or chills  Eyes: Denies changes in vision  Ears/Nose/Throat/Mouth: Denies nasal congestion or sore throat   Cardiovascular: Denies chest pain or  "palpitations   Respiratory: Denies shortness of breath, Denies cough  Gastrointestinal/Hepatic: Denies abdominal pain, nausea, vomiting   Genitourinary: Denies dysuria or frequency  Musculoskeletal/Rheum: Denies joint pain and swelling   Neurological: Denies headache or visual changes  Psychiatric: Denies mood disorder   Endocrine: Denies hx of diabetes or thyroid dysfunction  Heme/Oncology/Lymph Nodes: Denies weight changes or enlarged LNs.    GYN ROS:    Reports none menopause symptoms of hot flashes, night sweats, sleep disruption, mood changes.Denies vaginal dryness.   Normal menses.  Cramping is none.   No significant bloating/fluid retention, no pelvic pain, or dyspareunia. No abnormal bleeding or vaginal discharge.   No breast pain or tenderness, no new or enlarging lumps on elf-exam, nipple discharge, changes in size or contour, or abnormal cyclic discomfort.  No urinary tract symptoms, no incontinence, no polydipsia, polyuria,  No abdominal pain, change in bowel habits, black or bloody stools.    No menstrual migraines   No depression, labile mood, anxiety, libido changes, insomnia.  No temperature intolerance.    /68 (BP Location: Left arm, Patient Position: Sitting, BP Cuff Size: Adult long)   Pulse 75   Temp 37.1 °C (98.8 °F) (Temporal)   Ht 1.753 m (5' 9\")   Wt 75.5 kg (166 lb 7.2 oz)   SpO2 98%   BMI 24.58 kg/m²   Body mass index is 24.58 kg/m².  Vitals Noted and Reviewed    Physical Exam    Physical Exam  Constitutional:       General: She is not in acute distress.     Appearance: Normal appearance. She is normal weight. She is not ill-appearing.   HENT:      Head: Normocephalic and atraumatic.      Right Ear: Tympanic membrane, ear canal and external ear normal. There is no impacted cerumen.      Left Ear: Tympanic membrane, ear canal and external ear normal. There is no impacted cerumen.      Nose: Nose normal. No congestion or rhinorrhea.      Mouth/Throat:      Mouth: Mucous membranes " are moist.      Pharynx: No oropharyngeal exudate or posterior oropharyngeal erythema.   Eyes:      General:         Right eye: No discharge.         Left eye: No discharge.      Pupils: Pupils are equal, round, and reactive to light.   Cardiovascular:      Rate and Rhythm: Normal rate.      Pulses: Normal pulses.      Heart sounds: Normal heart sounds. No murmur heard.     No friction rub. No gallop.   Pulmonary:      Effort: Pulmonary effort is normal. No respiratory distress.      Breath sounds: Normal breath sounds. No wheezing, rhonchi or rales.   Abdominal:      General: Bowel sounds are normal. There is no distension.      Palpations: Abdomen is soft. There is no mass.      Tenderness: There is no abdominal tenderness. There is no right CVA tenderness, left CVA tenderness, guarding or rebound.      Hernia: No hernia is present.   Musculoskeletal:         General: No swelling, tenderness or deformity. Normal range of motion.      Cervical back: Normal range of motion and neck supple.      Right lower leg: No edema.      Left lower leg: No edema.   Lymphadenopathy:      Cervical: No cervical adenopathy.   Skin:     General: Skin is warm.      Findings: No rash.   Neurological:      General: No focal deficit present.      Mental Status: She is alert. Mental status is at baseline.      Cranial Nerves: No cranial nerve deficit.      Sensory: No sensory deficit.      Motor: No weakness.      Coordination: Coordination normal.      Gait: Gait normal.   Psychiatric:         Mood and Affect: Mood normal.         Behavior: Behavior normal.          Pelvic exam:   External genitalia are normal in appearance, no lesions.   Vulva: grossly unremarkable, no lesions or masses noted  Vagina: no abnormal discharge, normal color  Speculum exam:  Vaginal Mucosa: normal vaginal mucosa  Cervix: parous, normal cervix and mucosa, no lesions, non-friable. No cervical motion tenderness.  Pap performed and sample collected and sent to  lab.  Uterus: Surgically Absent, normal in size, no masses.  Bimanual exam: No uteromegaly, negative chandelier sign, adnexa freely movable and without enlargements bilaterally.  Rectal: not performed  Breast exam: Bilateral breasts are normal in appearance. Nipple and areola are normal in appearance. No nipple retraction. No abnormal skin texture. No dominant masses. No axillary lymphadenopathy, no skin changes.   Breasts: breasts symmetric, no dominant or suspicious mass, no skin or nipple changes, and no axillary adenopathy.  Note Right upper quadrant in Right breast being following with US, 6 mos screening from 7/2024. Pt is aware.      A chaperone was offered to the patient during today's exam. Chaperone name: Sammi was present.    Assessment and Plan  NormalGYN Exam  mammogram  pap smear  return annually or prn  Pap was processed and sent to the lab.    There are no diagnoses linked to this encounter.      Assessment & Plan           Plan:   mammogram  pap smear  additional lab tests per [facility] orders  return annually or prn  Discussed  breast self exam, mammography screening, STD prevention, feminine hygiene, adequate intake of calcium and vitamin D, diet and exercise     Discussed  breast self exam, diet and exercise, different methods of contraception   Follow-up in one year for annual physical.    This note was created using voice recognition software. There may be unintended errors in spelling, grammar or content.

## 2025-01-10 LAB
C TRACH DNA GENITAL QL NAA+PROBE: NEGATIVE
N GONORRHOEA DNA GENITAL QL NAA+PROBE: NEGATIVE
SPECIMEN SOURCE: NORMAL

## 2025-01-14 LAB — THINPREP PAP, CYTOLOGY NL11781: NORMAL

## 2025-01-16 LAB
HPV I/H RISK 1 DNA SPEC QL NAA+PROBE: NORMAL
SPECIMEN SOURCE: NORMAL

## 2025-01-17 LAB — NONINV COLON CA DNA+OCC BLD SCRN STL QL: NEGATIVE

## 2025-01-29 ENCOUNTER — HOSPITAL ENCOUNTER (OUTPATIENT)
Facility: MEDICAL CENTER | Age: 45
End: 2025-01-29
Attending: NURSE PRACTITIONER
Payer: COMMERCIAL

## 2025-01-29 ENCOUNTER — NON-PROVIDER VISIT (OUTPATIENT)
Dept: MEDICAL GROUP | Facility: MEDICAL CENTER | Age: 45
End: 2025-01-29
Payer: COMMERCIAL

## 2025-01-29 DIAGNOSIS — Z11.51 SCREENING FOR HPV (HUMAN PAPILLOMAVIRUS): ICD-10-CM

## 2025-01-29 PROCEDURE — 88142 CYTOPATH C/V THIN LAYER: CPT

## 2025-02-07 LAB
HPV I/H RISK 1 DNA SPEC QL NAA+PROBE: NOT DETECTED
SPECIMEN SOURCE: NORMAL
THINPREP PAP, CYTOLOGY NL11781: NORMAL

## 2025-03-13 ENCOUNTER — OFFICE VISIT (OUTPATIENT)
Dept: MEDICAL GROUP | Age: 45
End: 2025-03-13
Payer: COMMERCIAL

## 2025-03-13 VITALS
OXYGEN SATURATION: 97 % | WEIGHT: 172 LBS | HEART RATE: 82 BPM | TEMPERATURE: 96.9 F | DIASTOLIC BLOOD PRESSURE: 70 MMHG | HEIGHT: 69 IN | RESPIRATION RATE: 16 BRPM | SYSTOLIC BLOOD PRESSURE: 104 MMHG | BODY MASS INDEX: 25.48 KG/M2

## 2025-03-13 DIAGNOSIS — E78.00 HYPERCHOLESTEREMIA: ICD-10-CM

## 2025-03-13 DIAGNOSIS — R53.83 OTHER FATIGUE: ICD-10-CM

## 2025-03-13 DIAGNOSIS — E55.9 VITAMIN D DEFICIENCY: ICD-10-CM

## 2025-03-13 DIAGNOSIS — R63.5 WEIGHT GAIN: ICD-10-CM

## 2025-03-13 DIAGNOSIS — D50.0 IRON DEFICIENCY ANEMIA DUE TO CHRONIC BLOOD LOSS: ICD-10-CM

## 2025-03-13 PROCEDURE — 3078F DIAST BP <80 MM HG: CPT | Performed by: INTERNAL MEDICINE

## 2025-03-13 PROCEDURE — 99214 OFFICE O/P EST MOD 30 MIN: CPT | Performed by: INTERNAL MEDICINE

## 2025-03-13 PROCEDURE — 3074F SYST BP LT 130 MM HG: CPT | Performed by: INTERNAL MEDICINE

## 2025-03-13 ASSESSMENT — ENCOUNTER SYMPTOMS
WEIGHT LOSS: 0
NERVOUS/ANXIOUS: 0
DEPRESSION: 0
SHORTNESS OF BREATH: 0

## 2025-03-13 ASSESSMENT — FIBROSIS 4 INDEX: FIB4 SCORE: 1.54

## 2025-03-13 NOTE — PROGRESS NOTES
CC:   Chief Complaint   Patient presents with    Requesting Labs     Diagnoses of Other fatigue, Vitamin D deficiency, Iron deficiency anemia due to chronic blood loss, Hypercholesteremia, and Weight gain were pertinent to this visit.  Verbal consent was acquired by the patient to use [employer] ambient listening note generation during this visit Yes     History of Present Illness  Jennie is a pleasant 44 y.o. female presenting for evaluation of fatigue, weight gain.    She reports experiencing significant fatigue, the cause of which she is uncertain. She maintains adequate hydration, consuming approximately 48 ounces of water daily. She has not been to the gym lately. She does not experience excessive thirst or urination. She occasionally experiences headaches and dizziness. She is attempting to reduce her coffee intake, currently consuming four 8-ounce cups daily. Her sleep is disrupted, averaging only 4 hours per night, a decrease from her previous sleep pattern. She attributes this change to her work schedule, which includes night shifts and daytime work, resulting in a 12-hour shift followed by an additional 6 hours of work. This schedule has been consistent for the past year. She does not experience dysuria or burning sensation during urination.    She has observed a weight gain of approximately 5 pounds over the past month, contributing to her feelings of depression. She follows a pescatarian diet, with a high intake of fish. She recalls a past medical history of elevated levels of an unspecified substance in her blood, which was attributed to her high fish consumption. She was advised to abstain from seafood for a year and was prescribed medication, the name of which she does not remember.    She has a history of hysterectomy due to heavy menstrual bleeding but retains her ovaries. She wears a back brace for work, which she finds beneficial.     Current Outpatient Medications Ordered in Epic   Medication  "Sig Dispense Refill    rosuvastatin (CRESTOR) 10 MG Tab Take 1 Tablet by mouth every evening. 90 Tablet 3    VITAMIN D PO Take  by mouth.      multivitamin (THERAGRAN) Tab Take 1 Tablet by mouth every day.       No current [facility]-ordered facility-administered medications on file.     Review of Systems   Constitutional:  Positive for malaise/fatigue. Negative for weight loss.   Respiratory:  Negative for shortness of breath.    Cardiovascular:  Negative for chest pain.   Psychiatric/Behavioral:  Negative for depression. The patient is not nervous/anxious.    All other systems reviewed and are negative.    Objective:     Exam:  /70 (BP Location: Right arm, Patient Position: Sitting, BP Cuff Size: Adult)   Pulse 82   Temp 36.1 °C (96.9 °F) (Temporal)   Resp 16   Ht 1.74 m (5' 8.5\")   Wt 78 kg (172 lb)   SpO2 97%   BMI 25.77 kg/m²  Body mass index is 25.77 kg/m².    Physical Exam  Constitutional:       Appearance: Normal appearance. She is normal weight.   HENT:      Head: Normocephalic and atraumatic.      Nose: Nose normal. No congestion.      Mouth/Throat:      Mouth: Mucous membranes are moist.      Pharynx: Oropharynx is clear. No oropharyngeal exudate or posterior oropharyngeal erythema.   Cardiovascular:      Rate and Rhythm: Normal rate and regular rhythm.      Pulses: Normal pulses.      Heart sounds: Normal heart sounds. No murmur heard.  Pulmonary:      Effort: Pulmonary effort is normal. No respiratory distress.      Breath sounds: Normal breath sounds.   Abdominal:      General: Abdomen is flat. There is no distension.      Palpations: Abdomen is soft.      Tenderness: There is no abdominal tenderness.   Neurological:      Mental Status: She is alert and oriented to person, place, and time.   Psychiatric:         Mood and Affect: Mood normal.         Thought Content: Thought content normal.         Judgment: Judgment normal.       Assessment & Plan:   Jennie  is a pleasant 44 y.o. female with the " following -     Assessment & Plan  1. Fatigue.  She reports feeling tired a lot lately and has gained about 5 pounds since January. She drinks at least 48 ounces of water daily and has not been to the gym lately due to her fatigue. She works long hours, including night shifts, which may contribute to her fatigue. She reports sleeping only about 4 hours per night. Blood tests will be ordered to check iron levels, vitamin B12, thyroid function, blood sugar, and vitamin D. She is advised to fast before these tests.     2. Weight gain.  She has gained about 5 pounds since January. She follows a pescatarian diet and drinks a lot of coffee, about four 8-ounce cups daily. Blood tests will be ordered to check for any underlying conditions contributing to weight gain, including thyroid function and blood sugar levels.    3. Unsatisfactory Pap smear.  She had two invalid Pap smear results. She will need to contact the clinic to schedule a repeat Pap smear.    4.  Hyperlipidemia  Currently on rosuvastatin 10 mg daily. Pending lipid panel.     Problem List Items Addressed This Visit       Hypercholesteremia (Chronic)    Relevant Orders    Lipid Profile    Iron deficiency anemia due to chronic blood loss    Relevant Orders    IRON/TOTAL IRON BIND    Vitamin D deficiency    Relevant Orders    VITAMIN D,25 HYDROXY (DEFICIENCY)     Other Visit Diagnoses         Other fatigue        Relevant Orders    FOLATE    IRON/TOTAL IRON BIND    VITAMIN B12    FERRITIN    CBC WITH DIFFERENTIAL    Comp Metabolic Panel    HEMOGLOBIN A1C    TSH WITH REFLEX TO FT4      Weight gain        Relevant Orders    HEMOGLOBIN A1C          Follow-up  The patient will follow up in 1-2 weeks.    Please note that this dictation was created using voice recognition software. I have made every reasonable attempt to correct obvious errors, but I expect that there are errors of grammar and possibly content that I did not discover before finalizing the note.

## 2025-03-20 ENCOUNTER — RESULTS FOLLOW-UP (OUTPATIENT)
Dept: MEDICAL GROUP | Age: 45
End: 2025-03-20
Payer: COMMERCIAL

## 2025-03-20 ENCOUNTER — HOSPITAL ENCOUNTER (OUTPATIENT)
Facility: MEDICAL CENTER | Age: 45
End: 2025-03-20
Attending: INTERNAL MEDICINE
Payer: COMMERCIAL

## 2025-03-20 DIAGNOSIS — R63.5 WEIGHT GAIN: ICD-10-CM

## 2025-03-20 DIAGNOSIS — D50.0 IRON DEFICIENCY ANEMIA DUE TO CHRONIC BLOOD LOSS: ICD-10-CM

## 2025-03-20 DIAGNOSIS — E55.9 VITAMIN D DEFICIENCY: ICD-10-CM

## 2025-03-20 DIAGNOSIS — R53.83 OTHER FATIGUE: ICD-10-CM

## 2025-03-20 DIAGNOSIS — E78.00 HYPERCHOLESTEREMIA: ICD-10-CM

## 2025-03-20 LAB
25(OH)D3 SERPL-MCNC: 22 NG/ML (ref 30–100)
ALBUMIN SERPL BCP-MCNC: 4 G/DL (ref 3.2–4.9)
ALBUMIN/GLOB SERPL: 1.1 G/DL
ALP SERPL-CCNC: 47 U/L (ref 30–99)
ALT SERPL-CCNC: 15 U/L (ref 2–50)
ANION GAP SERPL CALC-SCNC: 10 MMOL/L (ref 7–16)
AST SERPL-CCNC: 31 U/L (ref 12–45)
BASOPHILS # BLD AUTO: 0.4 % (ref 0–1.8)
BASOPHILS # BLD: 0.02 K/UL (ref 0–0.12)
BILIRUB SERPL-MCNC: 0.7 MG/DL (ref 0.1–1.5)
BUN SERPL-MCNC: 8 MG/DL (ref 8–22)
CALCIUM ALBUM COR SERPL-MCNC: 9.2 MG/DL (ref 8.5–10.5)
CALCIUM SERPL-MCNC: 9.2 MG/DL (ref 8.4–10.2)
CHLORIDE SERPL-SCNC: 105 MMOL/L (ref 96–112)
CHOLEST SERPL-MCNC: 259 MG/DL (ref 100–199)
CO2 SERPL-SCNC: 23 MMOL/L (ref 20–33)
CREAT SERPL-MCNC: 0.97 MG/DL (ref 0.5–1.4)
EOSINOPHIL # BLD AUTO: 0.02 K/UL (ref 0–0.51)
EOSINOPHIL NFR BLD: 0.4 % (ref 0–6.9)
ERYTHROCYTE [DISTWIDTH] IN BLOOD BY AUTOMATED COUNT: 43.5 FL (ref 35.9–50)
EST. AVERAGE GLUCOSE BLD GHB EST-MCNC: 94 MG/DL
FASTING STATUS PATIENT QL REPORTED: NORMAL
FERRITIN SERPL-MCNC: 99.3 NG/ML (ref 10–291)
FOLATE SERPL-MCNC: 13 NG/ML
GFR SERPLBLD CREATININE-BSD FMLA CKD-EPI: 73 ML/MIN/1.73 M 2
GLOBULIN SER CALC-MCNC: 3.5 G/DL (ref 1.9–3.5)
GLUCOSE SERPL-MCNC: 91 MG/DL (ref 65–99)
HBA1C MFR BLD: 4.9 % (ref 4–5.6)
HCT VFR BLD AUTO: 40.1 % (ref 37–47)
HDLC SERPL-MCNC: 100 MG/DL
HGB BLD-MCNC: 12.9 G/DL (ref 12–16)
IMM GRANULOCYTES # BLD AUTO: 0.01 K/UL (ref 0–0.11)
IMM GRANULOCYTES NFR BLD AUTO: 0.2 % (ref 0–0.9)
IRON SATN MFR SERPL: 18 % (ref 15–55)
IRON SERPL-MCNC: 69 UG/DL (ref 40–170)
LDLC SERPL CALC-MCNC: 148 MG/DL
LYMPHOCYTES # BLD AUTO: 2.21 K/UL (ref 1–4.8)
LYMPHOCYTES NFR BLD: 39.3 % (ref 22–41)
MCH RBC QN AUTO: 29 PG (ref 27–33)
MCHC RBC AUTO-ENTMCNC: 32.2 G/DL (ref 32.2–35.5)
MCV RBC AUTO: 90.1 FL (ref 81.4–97.8)
MONOCYTES # BLD AUTO: 0.29 K/UL (ref 0–0.85)
MONOCYTES NFR BLD AUTO: 5.2 % (ref 0–13.4)
NEUTROPHILS # BLD AUTO: 3.08 K/UL (ref 1.82–7.42)
NEUTROPHILS NFR BLD: 54.5 % (ref 44–72)
NRBC # BLD AUTO: 0 K/UL
NRBC BLD-RTO: 0 /100 WBC (ref 0–0.2)
PLATELET # BLD AUTO: 261 K/UL (ref 164–446)
PMV BLD AUTO: 9.6 FL (ref 9–12.9)
POTASSIUM SERPL-SCNC: 3.7 MMOL/L (ref 3.6–5.5)
PROT SERPL-MCNC: 7.5 G/DL (ref 6–8.2)
RBC # BLD AUTO: 4.45 M/UL (ref 4.2–5.4)
SODIUM SERPL-SCNC: 138 MMOL/L (ref 135–145)
TIBC SERPL-MCNC: 391 UG/DL (ref 250–450)
TRIGL SERPL-MCNC: 57 MG/DL (ref 0–149)
TSH SERPL DL<=0.005 MIU/L-ACNC: 2.51 UIU/ML (ref 0.38–5.33)
UIBC SERPL-MCNC: 322 UG/DL (ref 110–370)
VIT B12 SERPL-MCNC: 1204 PG/ML (ref 211–911)
WBC # BLD AUTO: 5.6 K/UL (ref 4.8–10.8)

## 2025-03-20 PROCEDURE — 80061 LIPID PANEL: CPT

## 2025-03-20 PROCEDURE — 82746 ASSAY OF FOLIC ACID SERUM: CPT

## 2025-03-20 PROCEDURE — 36415 COLL VENOUS BLD VENIPUNCTURE: CPT

## 2025-03-20 PROCEDURE — 84443 ASSAY THYROID STIM HORMONE: CPT

## 2025-03-20 PROCEDURE — 83550 IRON BINDING TEST: CPT

## 2025-03-20 PROCEDURE — 82607 VITAMIN B-12: CPT

## 2025-03-20 PROCEDURE — 83540 ASSAY OF IRON: CPT

## 2025-03-20 PROCEDURE — 82306 VITAMIN D 25 HYDROXY: CPT

## 2025-03-20 PROCEDURE — 82728 ASSAY OF FERRITIN: CPT

## 2025-03-20 PROCEDURE — 83036 HEMOGLOBIN GLYCOSYLATED A1C: CPT

## 2025-03-20 PROCEDURE — 85025 COMPLETE CBC W/AUTO DIFF WBC: CPT

## 2025-03-20 PROCEDURE — 80053 COMPREHEN METABOLIC PANEL: CPT

## 2025-03-24 ENCOUNTER — OFFICE VISIT (OUTPATIENT)
Dept: FAMILY PLANNING/WOMEN'S HEALTH CLINIC | Facility: PHYSICIAN GROUP | Age: 45
End: 2025-03-24
Attending: FAMILY MEDICINE
Payer: COMMERCIAL

## 2025-03-24 VITALS
SYSTOLIC BLOOD PRESSURE: 108 MMHG | RESPIRATION RATE: 16 BRPM | BODY MASS INDEX: 25.48 KG/M2 | WEIGHT: 172 LBS | HEIGHT: 69 IN | HEART RATE: 76 BPM | OXYGEN SATURATION: 96 % | DIASTOLIC BLOOD PRESSURE: 70 MMHG

## 2025-03-24 DIAGNOSIS — F32.1 MODERATE MAJOR DEPRESSION (HCC): ICD-10-CM

## 2025-03-24 DIAGNOSIS — E66.3 OVERWEIGHT (BMI 25.0-29.9): ICD-10-CM

## 2025-03-24 DIAGNOSIS — E78.00 HYPERCHOLESTEREMIA: Chronic | ICD-10-CM

## 2025-03-24 PROCEDURE — 99214 OFFICE O/P EST MOD 30 MIN: CPT | Performed by: NURSE PRACTITIONER

## 2025-03-24 PROCEDURE — 3078F DIAST BP <80 MM HG: CPT | Performed by: NURSE PRACTITIONER

## 2025-03-24 PROCEDURE — 1126F AMNT PAIN NOTED NONE PRSNT: CPT | Performed by: NURSE PRACTITIONER

## 2025-03-24 PROCEDURE — 3074F SYST BP LT 130 MM HG: CPT | Performed by: NURSE PRACTITIONER

## 2025-03-24 SDOH — HEALTH STABILITY: PHYSICAL HEALTH: ON AVERAGE, HOW MANY DAYS PER WEEK DO YOU ENGAGE IN MODERATE TO STRENUOUS EXERCISE (LIKE A BRISK WALK)?: 3 DAYS

## 2025-03-24 SDOH — HEALTH STABILITY: PHYSICAL HEALTH: ON AVERAGE, HOW MANY MINUTES DO YOU ENGAGE IN EXERCISE AT THIS LEVEL?: 60 MIN

## 2025-03-24 SDOH — ECONOMIC STABILITY: HOUSING INSECURITY: IN THE LAST 12 MONTHS, WAS THERE A TIME WHEN YOU WERE NOT ABLE TO PAY THE MORTGAGE OR RENT ON TIME?: NO

## 2025-03-24 SDOH — SOCIAL STABILITY: SOCIAL INSECURITY
WITHIN THE LAST YEAR, HAVE YOU BEEN KICKED, HIT, SLAPPED, OR OTHERWISE PHYSICALLY HURT BY YOUR PARTNER OR EX-PARTNER?: NO

## 2025-03-24 SDOH — ECONOMIC STABILITY: HOUSING INSECURITY: AT ANY TIME IN THE PAST 12 MONTHS, WERE YOU HOMELESS OR LIVING IN A SHELTER (INCLUDING NOW)?: NO

## 2025-03-24 SDOH — HEALTH STABILITY: MENTAL HEALTH
DO YOU FEEL STRESS - TENSE, RESTLESS, NERVOUS, OR ANXIOUS, OR UNABLE TO SLEEP AT NIGHT BECAUSE YOUR MIND IS TROUBLED ALL THE TIME - THESE DAYS?: TO SOME EXTENT

## 2025-03-24 SDOH — SOCIAL STABILITY: SOCIAL INSECURITY: WITHIN THE LAST YEAR, HAVE YOU BEEN AFRAID OF YOUR PARTNER OR EX-PARTNER?: NO

## 2025-03-24 SDOH — ECONOMIC STABILITY: FOOD INSECURITY: WITHIN THE PAST 12 MONTHS, YOU WORRIED THAT YOUR FOOD WOULD RUN OUT BEFORE YOU GOT THE MONEY TO BUY MORE.: NEVER TRUE

## 2025-03-24 SDOH — ECONOMIC STABILITY: FOOD INSECURITY: HOW HARD IS IT FOR YOU TO PAY FOR THE VERY BASICS LIKE FOOD, HOUSING, MEDICAL CARE, AND HEATING?: NOT HARD AT ALL

## 2025-03-24 SDOH — SOCIAL STABILITY: SOCIAL INSECURITY: WITHIN THE LAST YEAR, HAVE YOU BEEN HUMILIATED OR EMOTIONALLY ABUSED IN OTHER WAYS BY YOUR PARTNER OR EX-PARTNER?: NO

## 2025-03-24 SDOH — SOCIAL STABILITY: SOCIAL NETWORK: IN A TYPICAL WEEK, HOW MANY TIMES DO YOU TALK ON THE PHONE WITH FAMILY, FRIENDS, OR NEIGHBORS?: TWICE A WEEK

## 2025-03-24 SDOH — SOCIAL STABILITY: SOCIAL INSECURITY
WITHIN THE LAST YEAR, HAVE YOU BEEN RAPED OR FORCED TO HAVE ANY KIND OF SEXUAL ACTIVITY BY YOUR PARTNER OR EX-PARTNER?: NO

## 2025-03-24 SDOH — ECONOMIC STABILITY: FOOD INSECURITY: WITHIN THE PAST 12 MONTHS, THE FOOD YOU BOUGHT JUST DIDN'T LAST AND YOU DIDN'T HAVE MONEY TO GET MORE.: NEVER TRUE

## 2025-03-24 SDOH — ECONOMIC STABILITY: TRANSPORTATION INSECURITY: IN THE PAST 12 MONTHS, HAS LACK OF TRANSPORTATION KEPT YOU FROM MEDICAL APPOINTMENTS OR FROM GETTING MEDICATIONS?: NO

## 2025-03-24 ASSESSMENT — ACTIVITIES OF DAILY LIVING (ADL)
LACK_OF_TRANSPORTATION: NO
BATHING_REQUIRES_ASSISTANCE: 0

## 2025-03-24 ASSESSMENT — ENCOUNTER SYMPTOMS: GENERAL WELL-BEING: GOOD

## 2025-03-24 ASSESSMENT — PATIENT HEALTH QUESTIONNAIRE - PHQ9
1. LITTLE INTEREST OR PLEASURE IN DOING THINGS: MORE THAN HALF THE DAYS
CLINICAL INTERPRETATION OF PHQ2 SCORE: 2
5. POOR APPETITE OR OVEREATING: 1 - SEVERAL DAYS
2. FEELING DOWN, DEPRESSED, IRRITABLE, OR HOPELESS: SEVERAL DAYS

## 2025-03-24 ASSESSMENT — PAIN SCALES - GENERAL: PAINLEVEL_OUTOF10: NO PAIN

## 2025-03-24 ASSESSMENT — FIBROSIS 4 INDEX: FIB4 SCORE: 1.38

## 2025-03-24 NOTE — PROGRESS NOTES
Comprehensive Health Assessment Program     Jennie Morales is a 44 y.o. here for her comprehensive health assessment.    Patient Active Problem List    Diagnosis Date Noted    Overweight (BMI 25.0-29.9) 03/24/2025    Breast lump in female 09/26/2024    Breast pain 01/30/2024    H/O total hysterectomy 12/08/2022    Moderate major depression (HCC) 04/14/2022    Hair loss 03/16/2022    Cyst of left ovary 03/02/2022    Elevated blood pressure reading 02/07/2022    Iron deficiency anemia due to chronic blood loss 02/07/2022    Hypercholesteremia 09/29/2020    Vitamin D deficiency 09/29/2020    TB lung, latent 10/12/2017    Health care maintenance 03/20/2017       Current Outpatient Medications   Medication Sig Dispense Refill    rosuvastatin (CRESTOR) 10 MG Tab Take 1 Tablet by mouth every evening. 90 Tablet 3    VITAMIN D PO Take  by mouth.      multivitamin (THERAGRAN) Tab Take 1 Tablet by mouth every day.       No current facility-administered medications for this visit.          Current supplements as per medication list.     Allergies:   Patient has no known allergies.  Social History     Tobacco Use    Smoking status: Never    Smokeless tobacco: Never   Vaping Use    Vaping status: Never Used   Substance Use Topics    Alcohol use: Yes     Alcohol/week: 1.2 oz     Types: 2 Glasses of wine per week     Comment: occ.    Drug use: No     Family History   Problem Relation Age of Onset    Diabetes Father     Hyperlipidemia Father     Hyperlipidemia Mother     Psychiatric Illness Mother     Cancer Sister         cervical cance    Cancer Maternal Grandmother     Hyperlipidemia Sister     Psychiatric Illness Sister     Heart Disease Sister     Heart Disease Brother     Thyroid Neg Hx      Jennie  has a past medical history of Anemia, Fibroids, Hyperlipidemia, Menometrorrhagia (7/14/2017), Urinary incontinence, and Uterine leiomyoma (3/27/2017).   Past Surgical History:   Procedure Laterality Date    GYN SURGERY       fibroids    HYSTERECTOMY LAPAROSCOPY         Screening:  In the last six months have you experienced any leakage of urine? No    Depression Screening  Little interest or pleasure in doing things?  2 - more than half the days  Feeling down, depressed , or hopeless? 1 - several days  Trouble falling or staying asleep, or sleeping too much?  3 - nearly every day  Feeling tired or having little energy?  3 - nearly every day  Poor appetite or overeating?  1 - several days  Feeling bad about yourself - or that you are a failure or have let yourself or your family down? 1 - several days  Trouble concentrating on things, such as reading the newspaper or watching television? 3 - nearly every day  Moving or speaking so slowly that other people could have noticed.  Or the opposite - being so fidgety or restless that you have been moving around a lot more than usual?  1 - several days  Thoughts that you would be better off dead, or of hurting yourself?  0 - not at all  Patient Health Questionnaire Score: 15 moderate     If depressive symptoms identified deferred to follow up visit unless specifically addressed in assessment and plan.    Interpretation of PHQ-9 Total Score   Score Severity   1-4 No Depression   5-9 Mild Depression   10-14 Moderate Depression   15-19 Moderately Severe Depression   20-27 Severe Depression      Fall Risk Assessment  Has the patient had two or more falls in the last year or any fall with injury in the last year?  No    Safety Assessment  Do you always wear your seatbelt?  Yes  Any changes to home needed to function safely? No  Difficulty hearing.  No  Patient counseled about all safety risks that were identified.    Functional Assessment ADLs  Are there any barriers preventing you from cooking for yourself or meeting nutritional needs?  No.    Are there any barriers preventing you from driving safely or obtaining transportation?  No.    Are there any barriers preventing you from using a telephone  or calling for help?  No    Are there any barriers preventing you from shopping?  No.    Are there any barriers preventing you from taking care of your own finances?  No    Are there any barriers preventing you from managing your medications?  No    Are there any barriers preventing you from showering, bathing or dressing yourself? No    Are there any barriers preventing you from doing housework or laundry? No    Are there any barriers preventing you from using the toilet?No    Are you currently engaging in any exercise or physical activity?  Yes.      Self-Assessment of Health  What is your perception of your health? Good    Do you sleep more than six hours a night? No    In the past 7 days, how much did pain keep you from doing your normal work? Some    Do you spend quality time with family or friends (virtually or in person)? No    Do you usually eat a heart healthy diet that constists of a variety of fruits, vegetables, whole grains and fiber? Yes    Do you eat foods high in fat and/or Fast Food more than three times per week? No    How concerned are you that your medical conditions are not being well managed? extremely    Are you worried that in the next 2 months, you may not have stable housing that you own, rent, or stay in as part of a household? No        Advance Care Planning  Do you have an Advance Directive, Living Will, Durable Power of , or POLST? No                 Health Maintenance Summary            Current Care Gaps       COVID-19 Vaccine (3 - 2024-25 season) Overdue since 9/1/2024      10/24/2022  Imm Admin: MODERNA BIVALENT BOOSTER SARS-COV-2 VACCINE (6+)    02/18/2022  Imm Admin: MODERNA SARS-COV-2 VACCINE (12+)    01/17/2022  Imm Admin: MODERNA SARS-COV-2 VACCINE (12+)              HIV Screening (Once) Never done     No completion history exists for this topic.              Hepatitis C Screening (Once) Never done     No completion history exists for this topic.              Hepatitis  B Vaccine (Hep B) (1 of 3 - 19+ 3-dose series) Never done     No completion history exists for this topic.                      Needs Review       Mammogram (Yearly) Tentatively due on 7/3/2025      07/03/2024  [id number]              Colorectal Cancer Screening (Colon Cancer Screening Cologuard Stool (FIT DNA) - Every 3 Years) Tentatively due on 1/9/2028 01/09/2025  COLOGUARD RESULT component of Cologuard® colon cancer screening                      Upcoming       IMM DTaP/Tdap/Td Vaccine (3 - Td or Tdap) Next due on 9/26/2034 09/26/2024  Imm Admin: Tdap Vaccine    01/18/2012  Imm Admin: Tdap Vaccine                      Completed or No Longer Recommended       Influenza Vaccine (Series Information) Completed      09/27/2024  Imm Admin: Influenza split virus trivalent (PF)    10/12/2020  Imm Admin: Influenza Vaccine Quad Inj (Preserved)              Hepatitis A Vaccine (Hep A) (Series Information) Aged Out      11/11/2020  Imm Admin: Hepatitis A Vaccine, Adult              HPV Vaccines (Series Information) Aged Out     No completion history exists for this topic.              Polio Vaccine (Inactivated Polio) (Series Information) Aged Out     No completion history exists for this topic.              Meningococcal Immunization (Series Information) Aged Out     No completion history exists for this topic.              Pneumococcal Vaccine: 0-49 Years (Series Information) Aged Out     No completion history exists for this topic.              Cervical Cancer Screening  Discontinued        Frequency changed to Never automatically (Topic No Longer Applies)    01/29/2025  THINPREP PAP WITH HPV    01/09/2025  THINPREP PAP W/HPV AND CTNG    07/14/2017  PATHOLOGY GYN SPECIMEN    07/14/2017  THINPREP PAP WITH HPV     Only the first 5 history entries have been loaded, but more history exists.                          Patient Care Team:  Liz Wyatt M.D. as PCP - General (Internal  "Medicine)    Financial Resource Strain: Low Risk  (3/24/2025)    Overall Financial Resource Strain (CARDIA)     Difficulty of Paying Living Expenses: Not hard at all      Transportation Needs: No Transportation Needs (3/24/2025)    PRAPARE - Transportation     Lack of Transportation (Medical): No     Lack of Transportation (Non-Medical): No      Food Insecurity: No Food Insecurity (3/24/2025)    Hunger Vital Sign     Worried About Running Out of Food in the Last Year: Never true     Ran Out of Food in the Last Year: Never true     Intimate Partner Violence: Not At Risk (3/24/2025)    Humiliation, Afraid, Rape, and Kick questionnaire     Fear of Current or Ex-Partner: No     Emotionally Abused: No     Physically Abused: No     Sexually Abused: No         Encounter Vitals  Blood Pressure: 108/70  Pulse: 76  Respiration: 16  Pulse Oximetry: 96 %  Weight: 78 kg (172 lb)  Height: 174 cm (5' 8.5\")  BMI (Calculated): 25.77  Pain Score: No pain     ROS:  No fever, chills, nausea, vomiting, diarrhea, chest pain or shortness of breath. See HPI.    Physical Exam:  Constitutional: NAD  HENMT: NC/AT, OP clear, TM's clear, no lymphadenopathy, no thyromegaly.  No JVD. (-) bilat carotid bruit   Cardiovascular: RRR,  No murmur   Lungs: CTAB bilat   Skin: No legions notes  Neurologic: Alert & oriented     Assessment and Plan. The following treatment and monitoring plan is recommended:  Overweight (BMI 25.0-29.9)  Stable. BMI 25.77. Continue heart healthy diet and regular exercise. Cont f/u with PCP for ongoing monitoring and discussion     Hypercholesteremia  Stable. Records review    Latest Reference Range & Units 03/20/25 07:14   Cholesterol,Tot 100 - 199 mg/dL 259 (H)   Triglycerides 0 - 149 mg/dL 57   HDL >=40 mg/dL 100   LDL <100 mg/dL 148 (H)   (H): Data is abnormally high  Patient taking rosuvastatin. Patient does report musculoskeletal issues. Consider discussion with PCP. Cont regular exercise. Cont f/u with PCP for ongoing " monitoring and medication management     Moderate major depression (HCC)  Stable  PHQ 9 - 15 moderate. Patient agrees with finding. Patient reports situational. Patient interested in referral to [facility]. Consider daily exercise, vit D exposure, journaling. No HI/ SI Cont f/u with PCP for ongoing discussion and follow up.       Services suggested: No services needed at this time    Follow-up: Return f/u with PCP as indicated.

## 2025-03-24 NOTE — ASSESSMENT & PLAN NOTE
Stable  PHQ 9 - 15 moderate. Patient agrees with finding. Patient reports situational. Patient interested in referral to [facility]. Consider daily exercise, vit D exposure, journaling. No HI/ SI Cont f/u with PCP for ongoing discussion and follow up.

## 2025-03-24 NOTE — ASSESSMENT & PLAN NOTE
Stable. BMI 25.77. Continue heart healthy diet and regular exercise. Cont f/u with PCP for ongoing monitoring and discussion

## 2025-03-24 NOTE — ASSESSMENT & PLAN NOTE
Stable. Records review    Latest Reference Range & Units 03/20/25 07:14   Cholesterol,Tot 100 - 199 mg/dL 259 (H)   Triglycerides 0 - 149 mg/dL 57   HDL >=40 mg/dL 100   LDL <100 mg/dL 148 (H)   (H): Data is abnormally high  Patient taking rosuvastatin. Patient does report musculoskeletal issues. Consider discussion with PCP. Cont regular exercise. Cont f/u with PCP for ongoing monitoring and medication management

## 2025-03-26 ENCOUNTER — TELEPHONE (OUTPATIENT)
Dept: HEALTH INFORMATION MANAGEMENT | Facility: OTHER | Age: 45
End: 2025-03-26
Payer: COMMERCIAL

## 2025-03-26 NOTE — TELEPHONE ENCOUNTER
"Called pt and relayed 's message. Pt states she know she had an abnormal ultrasound,  but they are always abnormal and she thinks \"it's ok, I'm fine\". Pt says she is still paying for the last ultrasound and doesn't want to pay for another.   Let pt know there are some options for free mammograms and breast exams in [place]. Let pt know about the [facility] and provided number. Also provided number to [facility] in [place] that also provides free breast exams/mammograms. Number provided.   Answered all questions. Pt verbalized understanding. No further needs at this time.      ----- Message from Physician Liz Wyatt M.D. sent at 3/26/2025  4:13 PM PDT -----  Regarding: US breast  Please contact patient.  She is due for breast ultrasound to follow-up on abnormal ultrasound from 6 months ago.  Thank you!  "
No

## 2025-04-24 ENCOUNTER — APPOINTMENT (OUTPATIENT)
Dept: MEDICAL GROUP | Age: 45
End: 2025-04-24
Payer: COMMERCIAL